# Patient Record
Sex: FEMALE | Race: BLACK OR AFRICAN AMERICAN | Employment: OTHER | URBAN - NONMETROPOLITAN AREA
[De-identification: names, ages, dates, MRNs, and addresses within clinical notes are randomized per-mention and may not be internally consistent; named-entity substitution may affect disease eponyms.]

---

## 2018-11-15 ENCOUNTER — APPOINTMENT (OUTPATIENT)
Dept: CT IMAGING | Age: 68
End: 2018-11-15
Payer: MEDICARE

## 2018-11-15 ENCOUNTER — APPOINTMENT (OUTPATIENT)
Dept: GENERAL RADIOLOGY | Age: 68
End: 2018-11-15
Payer: MEDICARE

## 2018-11-15 ENCOUNTER — HOSPITAL ENCOUNTER (EMERGENCY)
Age: 68
Discharge: NURSING, REHAB OR LONG-TERM CARE | End: 2018-11-15
Payer: MEDICARE

## 2018-11-15 VITALS
DIASTOLIC BLOOD PRESSURE: 70 MMHG | TEMPERATURE: 97.2 F | BODY MASS INDEX: 23.08 KG/M2 | RESPIRATION RATE: 8 BRPM | SYSTOLIC BLOOD PRESSURE: 90 MMHG | HEART RATE: 61 BPM | OXYGEN SATURATION: 96 % | WEIGHT: 143 LBS

## 2018-11-15 DIAGNOSIS — R40.4 TRANSIENT ALTERATION OF AWARENESS: Primary | ICD-10-CM

## 2018-11-15 LAB
ALBUMIN SERPL-MCNC: 4.1 G/DL (ref 3.5–5.1)
ALP BLD-CCNC: 52 U/L (ref 38–126)
ALT SERPL-CCNC: 12 U/L (ref 11–66)
AMPHETAMINE+METHAMPHETAMINE URINE SCREEN: NEGATIVE
ANION GAP SERPL CALCULATED.3IONS-SCNC: 11 MEQ/L (ref 8–16)
APTT: 30.2 SECONDS (ref 22–38)
AST SERPL-CCNC: 11 U/L (ref 5–40)
BARBITURATE QUANTITATIVE URINE: NEGATIVE
BASOPHILS # BLD: 0.5 %
BASOPHILS ABSOLUTE: 0 THOU/MM3 (ref 0–0.1)
BENZODIAZEPINE QUANTITATIVE URINE: NEGATIVE
BILIRUB SERPL-MCNC: 0.2 MG/DL (ref 0.3–1.2)
BILIRUBIN DIRECT: < 0.2 MG/DL (ref 0–0.3)
BILIRUBIN URINE: NEGATIVE
BLOOD, URINE: NEGATIVE
BUN BLDV-MCNC: 23 MG/DL (ref 7–22)
CALCIUM SERPL-MCNC: 9.2 MG/DL (ref 8.5–10.5)
CANNABINOID QUANTITATIVE URINE: NEGATIVE
CHARACTER, URINE: CLEAR
CHLORIDE BLD-SCNC: 106 MEQ/L (ref 98–111)
CO2: 27 MEQ/L (ref 23–33)
COCAINE METABOLITE QUANTITATIVE URINE: NEGATIVE
COLOR: YELLOW
CREAT SERPL-MCNC: 0.7 MG/DL (ref 0.4–1.2)
EKG ATRIAL RATE: 72 BPM
EKG P AXIS: 68 DEGREES
EKG P-R INTERVAL: 164 MS
EKG Q-T INTERVAL: 372 MS
EKG QRS DURATION: 80 MS
EKG QTC CALCULATION (BAZETT): 407 MS
EKG R AXIS: 51 DEGREES
EKG T AXIS: 71 DEGREES
EKG VENTRICULAR RATE: 72 BPM
EOSINOPHIL # BLD: 1.1 %
EOSINOPHILS ABSOLUTE: 0.1 THOU/MM3 (ref 0–0.4)
ERYTHROCYTE [DISTWIDTH] IN BLOOD BY AUTOMATED COUNT: 14.2 % (ref 11.5–14.5)
ERYTHROCYTE [DISTWIDTH] IN BLOOD BY AUTOMATED COUNT: 47 FL (ref 35–45)
ETHYL ALCOHOL, SERUM: < 0.01 %
GFR SERPL CREATININE-BSD FRML MDRD: > 90 ML/MIN/1.73M2
GLUCOSE BLD-MCNC: 83 MG/DL (ref 70–108)
GLUCOSE BLD-MCNC: 85 MG/DL (ref 70–108)
GLUCOSE URINE: NEGATIVE MG/DL
HCT VFR BLD CALC: 41.9 % (ref 37–47)
HEMOGLOBIN: 13.5 GM/DL (ref 12–16)
IMMATURE GRANS (ABS): 0.04 THOU/MM3 (ref 0–0.07)
IMMATURE GRANULOCYTES: 0.5 %
INR BLD: 0.93 (ref 0.85–1.13)
KETONES, URINE: NEGATIVE
LEUKOCYTE ESTERASE, URINE: NEGATIVE
LIPASE: 163 U/L (ref 5.6–51.3)
LYMPHOCYTES # BLD: 32.5 %
LYMPHOCYTES ABSOLUTE: 2.9 THOU/MM3 (ref 1–4.8)
MAGNESIUM: 2.3 MG/DL (ref 1.6–2.4)
MCH RBC QN AUTO: 29.4 PG (ref 26–33)
MCHC RBC AUTO-ENTMCNC: 32.2 GM/DL (ref 32.2–35.5)
MCV RBC AUTO: 91.3 FL (ref 81–99)
MONOCYTES # BLD: 5.1 %
MONOCYTES ABSOLUTE: 0.5 THOU/MM3 (ref 0.4–1.3)
NITRITE, URINE: NEGATIVE
NUCLEATED RED BLOOD CELLS: 0 /100 WBC
OPIATES, URINE: NEGATIVE
OSMOLALITY CALCULATION: 289.7 MOSMOL/KG (ref 275–300)
OXYCODONE: NEGATIVE
PH UA: 6
PHENCYCLIDINE QUANTITATIVE URINE: NEGATIVE
PLATELET # BLD: 220 THOU/MM3 (ref 130–400)
PMV BLD AUTO: 9.9 FL (ref 9.4–12.4)
POTASSIUM SERPL-SCNC: 4.6 MEQ/L (ref 3.5–5.2)
PRO-BNP: 135.4 PG/ML (ref 0–900)
PROLACTIN: 37.9 NG/ML
PROTEIN UA: NEGATIVE
RBC # BLD: 4.59 MILL/MM3 (ref 4.2–5.4)
SEG NEUTROPHILS: 60.3 %
SEGMENTED NEUTROPHILS ABSOLUTE COUNT: 5.4 THOU/MM3 (ref 1.8–7.7)
SODIUM BLD-SCNC: 144 MEQ/L (ref 135–145)
SPECIFIC GRAVITY, URINE: 1.02 (ref 1–1.03)
TOTAL PROTEIN: 7.1 G/DL (ref 6.1–8)
TROPONIN T: < 0.01 NG/ML
TSH SERPL DL<=0.05 MIU/L-ACNC: 4.74 UIU/ML (ref 0.4–4.2)
UROBILINOGEN, URINE: 0.2 EU/DL
VALPROIC ACID LEVEL: 51.2 UG/ML (ref 50–100)
WBC # BLD: 8.9 THOU/MM3 (ref 4.8–10.8)

## 2018-11-15 PROCEDURE — 85730 THROMBOPLASTIN TIME PARTIAL: CPT

## 2018-11-15 PROCEDURE — 93005 ELECTROCARDIOGRAM TRACING: CPT | Performed by: EMERGENCY MEDICINE

## 2018-11-15 PROCEDURE — G0480 DRUG TEST DEF 1-7 CLASSES: HCPCS

## 2018-11-15 PROCEDURE — 70450 CT HEAD/BRAIN W/O DYE: CPT

## 2018-11-15 PROCEDURE — 83690 ASSAY OF LIPASE: CPT

## 2018-11-15 PROCEDURE — 84146 ASSAY OF PROLACTIN: CPT

## 2018-11-15 PROCEDURE — 83880 ASSAY OF NATRIURETIC PEPTIDE: CPT

## 2018-11-15 PROCEDURE — 36415 COLL VENOUS BLD VENIPUNCTURE: CPT

## 2018-11-15 PROCEDURE — 84443 ASSAY THYROID STIM HORMONE: CPT

## 2018-11-15 PROCEDURE — 80053 COMPREHEN METABOLIC PANEL: CPT

## 2018-11-15 PROCEDURE — 84484 ASSAY OF TROPONIN QUANT: CPT

## 2018-11-15 PROCEDURE — 83735 ASSAY OF MAGNESIUM: CPT

## 2018-11-15 PROCEDURE — 80164 ASSAY DIPROPYLACETIC ACD TOT: CPT

## 2018-11-15 PROCEDURE — 81003 URINALYSIS AUTO W/O SCOPE: CPT

## 2018-11-15 PROCEDURE — 82948 REAGENT STRIP/BLOOD GLUCOSE: CPT

## 2018-11-15 PROCEDURE — 80307 DRUG TEST PRSMV CHEM ANLYZR: CPT

## 2018-11-15 PROCEDURE — 85610 PROTHROMBIN TIME: CPT

## 2018-11-15 PROCEDURE — 93010 ELECTROCARDIOGRAM REPORT: CPT | Performed by: INTERNAL MEDICINE

## 2018-11-15 PROCEDURE — 71045 X-RAY EXAM CHEST 1 VIEW: CPT

## 2018-11-15 PROCEDURE — 85025 COMPLETE CBC W/AUTO DIFF WBC: CPT

## 2018-11-15 PROCEDURE — 82248 BILIRUBIN DIRECT: CPT

## 2018-11-15 PROCEDURE — 99285 EMERGENCY DEPT VISIT HI MDM: CPT

## 2018-11-15 NOTE — ED NOTES
Bed: 007A  Expected date: 11/15/18  Expected time:   Means of arrival: [facility]  Comments:      Wende Soulier, RN  11/15/18 4519

## 2018-11-15 NOTE — ED NOTES
Waiting on [facility] to pick patient up to take to [facility].   time at this time is [facility], RN  11/15/18 3094

## 2018-11-15 NOTE — ED PROVIDER NOTES
tympanic membrane and ear canal normal. No hemotympanum. Nose: Nose normal.   Mouth/Throat: Uvula is midline, oropharynx is clear and moist and mucous membranes are normal.   Eyes: Pupils are equal, round, and reactive to light. Conjunctivae, EOM and lids are normal.   Neck: Trachea normal and normal range of motion. Neck supple. No JVD present. No neck rigidity. No tracheal deviation present. Cardiovascular: Normal rate, regular rhythm and normal heart sounds. No murmur heard. Pulmonary/Chest: Effort normal and breath sounds normal.   Abdominal: Soft. Normal appearance. She exhibits no distension. There is no tenderness. There is no rigidity and no guarding. Musculoskeletal: Normal range of motion. Extremities well perfused; No signs of DVT. Lymphadenopathy:     She has no cervical adenopathy. Neurological: She is alert. She has normal strength. She displays tremor. No cranial nerve deficit or sensory deficit. GCS eye subscore is 2. GCS verbal subscore is 2. GCS motor subscore is 4. Patient is a non-verbal Alzheimer's-dementia patient, responsive to pain and \"groans\" when rolled for further evaluation. Skin: Skin is warm and dry. No rash noted. Psychiatric: She has a normal mood and affect. Her behavior is normal.   Nursing note and vitals reviewed. DIFFERENTIAL DIAGNOSIS:   Including but not limited to: altered mental status, seizure, CVA, UTI, arrhythmia. DIAGNOSTIC RESULTS     EKG: [facility]'s [facility] who either signs or Co-signs this chart in the absence of a cardiologist.  Ernestina Lazcano.  Rate: 72 bpm  AZ interval: 164 ms  QRS duration: 80 ms  QTc: 407 ms  P-R-T axes: 68, 51, 71  Normal Sinus, No STEMI  Compared to old EKG on 02-Sept-2015    RADIOLOGY: non-plain film images(s) such as CT,Ultrasound and MRI are read by the radiologist.  Plain radiographic images are visualized and preliminarily interpreted by the emergency physician unless otherwise stated below. XR CHEST PORTABLE   Final Result      No acute cardiopulmonary disease. Mild cardiomegaly. **This report has been created using voice recognition software. It may contain minor errors which are inherent in voice recognition technology. **      Final report electronically signed by Dr. Julius Stapleton on 11/15/2018 6:55 AM      CT HEAD WO CONTRAST   Final Result      No acute ischemic infarct, hemorrhage, or mass effect. Ventricles remain dilated out of proportion to the degree of cortical sulcal prominence, either due to greater central atrophy versus communicating hydrocephalus. Correlate clinically. Stable appearance of the brain compared to the previous study as detailed above. **This report has been created using voice recognition software. It may contain minor errors which are inherent in voice recognition technology. **      Final report electronically signed by Dr. Julius Stapleton on 11/15/2018 6:13 AM          LABS:   Labs Reviewed   CBC WITH AUTO DIFFERENTIAL - Abnormal; Notable for the following:        Result Value    RDW-SD 47.0 (*)     All other components within normal limits   BASIC METABOLIC PANEL - Abnormal; Notable for the following:     BUN 23 (*)     All other components within normal limits   HEPATIC FUNCTION PANEL - Abnormal; Notable for the following:      Total Bilirubin 0.2 (*)     All other components within normal limits   LIPASE - Abnormal; Notable for the following:     Lipase 163.0 (*)     All other components within normal limits   TSH WITHOUT REFLEX - Abnormal; Notable for the following:     TSH 4.740 (*)     All other components within normal limits   PROTIME-INR   APTT   BRAIN NATRIURETIC PEPTIDE   TROPONIN   MAGNESIUM   ETHANOL   URINE DRUG SCREEN   URINE RT REFLEX TO CULTURE   VALPROIC ACID LEVEL, TOTAL   ANION GAP   OSMOLALITY   GLOMERULAR FILTRATION RATE, ESTIMATED   PROLACTIN   POCT GLUCOSE       EMERGENCY DEPARTMENT COURSE:   Vitals: Vitals:    11/15/18 0624 11/15/18 0803 11/15/18 0913 11/15/18 0936   BP:  105/79 (!) 101/59 90/70   Pulse: 71 66 68 61   Resp: 19 13 16 8   Temp:       TempSrc:       SpO2: 100% 95% 96%    Weight:         Patient was seen and evaluated for an episode of altered mental status per the nursing home. Physical exam revealed the patient to be responsive to pain and at patient's baseline mentation. Lab results were within normal limits for patient's baseline. Chest XR indicated mild cardiomegaly with no acute cardiopulmonary disease. Head CT indicated no acute ischemic infarct, hemorrhage or mass effect, stable appearance of the brain compared to previous study. The patient has remained stable while here. She has been observed on a cardiac monitor and pulse oximetry. She remains neurologically intact and at her baseline mentation. Patient was discharged back to [facility] in stable condition. They were instructed to return patient to ED for any new or worsening symptoms. CRITICAL CARE:   None    CONSULTS:  None    PROCEDURES:  None    FINAL IMPRESSION      1. Transient alteration of awareness          DISPOSITION/PLAN   Discharged  1. Transient alteration of awareness        PATIENT REFERRED TO:  DO Luana Harris 5  [phone]    Schedule an appointment as soon as possible for a visit         DISCHARGE MEDICATIONS:  Discharge Medication List as of 11/15/2018  8:09 AM          (Please note that portions of this note were completed with a voice recognition program.  Efforts were made to edit the dictations but occasionally words aremis-transcribed.)    Scribe:  Lilliana Cervantes 11/15/18 6:13 AM Scribing for and in the presence of ANMOL Bowden.     Signed by: Lindy Muñoz 11/15/18 11:54 AM    Provider:  I personally performed the services described in the documentation, reviewed and edited the documentation which was dictated to the scribe in my presence, and

## 2019-02-15 ENCOUNTER — HOSPITAL ENCOUNTER (EMERGENCY)
Age: 69
Discharge: NURSING, REHAB OR LONG-TERM CARE | End: 2019-02-15
Payer: MEDICARE

## 2019-02-15 VITALS
BODY MASS INDEX: 22.6 KG/M2 | TEMPERATURE: 97.7 F | OXYGEN SATURATION: 98 % | RESPIRATION RATE: 16 BRPM | SYSTOLIC BLOOD PRESSURE: 115 MMHG | WEIGHT: 140 LBS | DIASTOLIC BLOOD PRESSURE: 44 MMHG | HEART RATE: 71 BPM

## 2019-02-15 DIAGNOSIS — R56.9 SEIZURE (HCC): Primary | ICD-10-CM

## 2019-02-15 LAB
ALBUMIN SERPL-MCNC: 4.3 G/DL (ref 3.5–5.1)
ALP BLD-CCNC: 46 U/L (ref 38–126)
ALT SERPL-CCNC: 8 U/L (ref 11–66)
ANION GAP SERPL CALCULATED.3IONS-SCNC: 14 MEQ/L (ref 8–16)
AST SERPL-CCNC: 13 U/L (ref 5–40)
BASOPHILS # BLD: 0.5 %
BASOPHILS ABSOLUTE: 0 THOU/MM3 (ref 0–0.1)
BILIRUB SERPL-MCNC: 0.3 MG/DL (ref 0.3–1.2)
BILIRUBIN URINE: NEGATIVE
BLOOD, URINE: NEGATIVE
BUN BLDV-MCNC: 21 MG/DL (ref 7–22)
CALCIUM SERPL-MCNC: 9 MG/DL (ref 8.5–10.5)
CHARACTER, URINE: CLEAR
CHLORIDE BLD-SCNC: 105 MEQ/L (ref 98–111)
CO2: 24 MEQ/L (ref 23–33)
COLOR: YELLOW
CREAT SERPL-MCNC: 0.7 MG/DL (ref 0.4–1.2)
EKG ATRIAL RATE: 96 BPM
EKG P AXIS: 61 DEGREES
EKG P-R INTERVAL: 182 MS
EKG Q-T INTERVAL: 344 MS
EKG QRS DURATION: 86 MS
EKG QTC CALCULATION (BAZETT): 434 MS
EKG R AXIS: 32 DEGREES
EKG T AXIS: 65 DEGREES
EKG VENTRICULAR RATE: 96 BPM
EOSINOPHIL # BLD: 2.3 %
EOSINOPHILS ABSOLUTE: 0.2 THOU/MM3 (ref 0–0.4)
ERYTHROCYTE [DISTWIDTH] IN BLOOD BY AUTOMATED COUNT: 14.5 % (ref 11.5–14.5)
ERYTHROCYTE [DISTWIDTH] IN BLOOD BY AUTOMATED COUNT: 48.3 FL (ref 35–45)
GFR SERPL CREATININE-BSD FRML MDRD: > 90 ML/MIN/1.73M2
GLUCOSE BLD-MCNC: 81 MG/DL (ref 70–108)
GLUCOSE URINE: NEGATIVE MG/DL
HCT VFR BLD CALC: 40.4 % (ref 37–47)
HEMOGLOBIN: 13.1 GM/DL (ref 12–16)
IMMATURE GRANS (ABS): 0.02 THOU/MM3 (ref 0–0.07)
IMMATURE GRANULOCYTES: 0.3 %
KETONES, URINE: NEGATIVE
LEUKOCYTE ESTERASE, URINE: NEGATIVE
LYMPHOCYTES # BLD: 46.5 %
LYMPHOCYTES ABSOLUTE: 3.5 THOU/MM3 (ref 1–4.8)
MAGNESIUM: 2 MG/DL (ref 1.6–2.4)
MCH RBC QN AUTO: 29.6 PG (ref 26–33)
MCHC RBC AUTO-ENTMCNC: 32.4 GM/DL (ref 32.2–35.5)
MCV RBC AUTO: 91.2 FL (ref 81–99)
MONOCYTES # BLD: 4.5 %
MONOCYTES ABSOLUTE: 0.3 THOU/MM3 (ref 0.4–1.3)
NITRITE, URINE: NEGATIVE
NUCLEATED RED BLOOD CELLS: 0 /100 WBC
OSMOLALITY CALCULATION: 287 MOSMOL/KG (ref 275–300)
PH UA: 6
PLATELET # BLD: 211 THOU/MM3 (ref 130–400)
PMV BLD AUTO: 9.7 FL (ref 9.4–12.4)
POTASSIUM SERPL-SCNC: 4.6 MEQ/L (ref 3.5–5.2)
PROTEIN UA: NEGATIVE
RBC # BLD: 4.43 MILL/MM3 (ref 4.2–5.4)
SEG NEUTROPHILS: 45.9 %
SEGMENTED NEUTROPHILS ABSOLUTE COUNT: 3.4 THOU/MM3 (ref 1.8–7.7)
SODIUM BLD-SCNC: 143 MEQ/L (ref 135–145)
SPECIFIC GRAVITY, URINE: 1.01 (ref 1–1.03)
TOTAL PROTEIN: 6.8 G/DL (ref 6.1–8)
TROPONIN T: < 0.01 NG/ML
UROBILINOGEN, URINE: 0.2 EU/DL
VALPROIC ACID LEVEL: 48.7 UG/ML (ref 50–100)
WBC # BLD: 7.5 THOU/MM3 (ref 4.8–10.8)

## 2019-02-15 PROCEDURE — 99285 EMERGENCY DEPT VISIT HI MDM: CPT

## 2019-02-15 PROCEDURE — 36415 COLL VENOUS BLD VENIPUNCTURE: CPT

## 2019-02-15 PROCEDURE — 80053 COMPREHEN METABOLIC PANEL: CPT

## 2019-02-15 PROCEDURE — 80164 ASSAY DIPROPYLACETIC ACD TOT: CPT

## 2019-02-15 PROCEDURE — 81003 URINALYSIS AUTO W/O SCOPE: CPT

## 2019-02-15 PROCEDURE — 85025 COMPLETE CBC W/AUTO DIFF WBC: CPT

## 2019-02-15 PROCEDURE — 83735 ASSAY OF MAGNESIUM: CPT

## 2019-02-15 PROCEDURE — 93005 ELECTROCARDIOGRAM TRACING: CPT | Performed by: EMERGENCY MEDICINE

## 2019-02-15 PROCEDURE — 2709999900 HC NON-CHARGEABLE SUPPLY

## 2019-02-15 PROCEDURE — 93010 ELECTROCARDIOGRAM REPORT: CPT | Performed by: INTERNAL MEDICINE

## 2019-02-15 PROCEDURE — 84484 ASSAY OF TROPONIN QUANT: CPT

## 2019-02-15 RX ORDER — MELOXICAM 7.5 MG/1
7.5 TABLET ORAL DAILY
Status: ON HOLD | COMMUNITY
End: 2021-07-21 | Stop reason: HOSPADM

## 2019-02-15 RX ORDER — FAMOTIDINE 20 MG/1
20 TABLET, FILM COATED ORAL 2 TIMES DAILY
Status: ON HOLD | COMMUNITY
End: 2021-07-18

## 2019-02-15 RX ORDER — CLONAZEPAM 0.5 MG/1
0.5 TABLET ORAL 2 TIMES DAILY PRN
COMMUNITY
End: 2019-05-18

## 2019-02-15 RX ORDER — SENNOSIDES 8.8 MG/5ML
5 LIQUID ORAL NIGHTLY
Status: ON HOLD | COMMUNITY
End: 2021-07-18

## 2019-04-30 PROBLEM — G40.909 SEIZURE DISORDER (HCC): Chronic | Status: ACTIVE | Noted: 2019-04-30

## 2019-04-30 PROBLEM — G30.1 LATE ONSET ALZHEIMER'S DISEASE WITH BEHAVIORAL DISTURBANCE (HCC): Chronic | Status: ACTIVE | Noted: 2019-04-30

## 2019-04-30 PROBLEM — F02.818 LATE ONSET ALZHEIMER'S DISEASE WITH BEHAVIORAL DISTURBANCE (HCC): Chronic | Status: ACTIVE | Noted: 2019-04-30

## 2019-04-30 NOTE — PROGRESS NOTES
Lainey Yang is a 71 y.o. female that is being seen at [facility] for Dementia      Lainey Yang  1950  5/3/19      HPI:  Patient seen and examined at bedside. History obtained from patient and chart. Patient is unable to give any history today due to her dementia. Resting comfortably in bed. I have reviewed the patient's past medical history, past surgical history, allergies,medications, social and family history and I have made updates where appropriate. Past Medical History:   Diagnosis Date    Anxiety     CAD (coronary artery disease)     Chronic pain     Chronic UTI     Depression     GERD (gastroesophageal reflux disease)     Hypertension     Psychiatric problem        Past Surgical History:   Procedure Laterality Date    TONSILLECTOMY      TUBAL LIGATION         Social History     Tobacco Use    Smoking status: Former Smoker     Types: Cigarettes   Substance Use Topics    Alcohol use: No    Drug use: Yes     Types: Marijuana       Family History   Problem Relation Age of Onset    Other Mother          Review of Systems        PHYSICAL EXAM:    Weight - 147 lbs  BP - 115/67  Temp - 98.0  HR - 66  RR - 18    Physical Exam   Constitutional: She appears well-developed and well-nourished. No distress. HENT:   Head: Normocephalic and atraumatic. Right Ear: Hearing and external ear normal.   Left Ear: Hearing and external ear normal.   Nose: Nose normal.   Mouth/Throat: Oropharynx is clear and moist.   Eyes: Conjunctivae are normal. Right eye exhibits no discharge. Left eye exhibits no discharge. Neck: Normal range of motion. Neck supple. No tracheal deviation present. No thyromegaly present. Cardiovascular: Normal rate, regular rhythm and normal heart sounds. Exam reveals no gallop and no friction rub. No murmur heard. Pulmonary/Chest: Effort normal. No respiratory distress. She has no wheezes. She has no rales. She exhibits no tenderness.    Musculoskeletal: She exhibits no deformity. Lymphadenopathy:     She has no cervical adenopathy. Neurological: She is alert. She exhibits normal muscle tone. Coordination normal.   Skin: Skin is warm and dry. No rash noted. No erythema. Psychiatric: She has a normal mood and affect. She is slowed. Cognition and memory are impaired. She expresses inappropriate judgment. Nursing note and vitals reviewed. ASSESSMENT & PLAN  Fariba Bryson was seen today for dementia. Diagnoses and all orders for this visit:    Essential hypertension    Late onset Alzheimer's disease with behavioral disturbance    Seizure disorder ([facility] [address]        1. Dementia with Behavioral Disturbances:  cont Aricept, Klonopin, Trazodone. 2. Chronic Pain:  cont APAP  3. Seizures:  cont Divalproex, PRN Ativan  4. GERD:  cont omeprazole  5. HTN:  cont Metoprolol, Lasix, ASA  6.  Dispo:  stable, reeval in 1 month or PRN

## 2019-05-03 ENCOUNTER — OUTSIDE SERVICES (OUTPATIENT)
Dept: FAMILY MEDICINE CLINIC | Age: 69
End: 2019-05-03
Payer: MEDICARE

## 2019-05-03 DIAGNOSIS — G30.1 LATE ONSET ALZHEIMER'S DISEASE WITH BEHAVIORAL DISTURBANCE (HCC): Chronic | ICD-10-CM

## 2019-05-03 DIAGNOSIS — G40.909 SEIZURE DISORDER (HCC): Chronic | ICD-10-CM

## 2019-05-03 DIAGNOSIS — F02.818 LATE ONSET ALZHEIMER'S DISEASE WITH BEHAVIORAL DISTURBANCE (HCC): Chronic | ICD-10-CM

## 2019-05-03 DIAGNOSIS — I10 ESSENTIAL HYPERTENSION: Primary | ICD-10-CM

## 2019-05-03 PROCEDURE — 1123F ACP DISCUSS/DSCN MKR DOCD: CPT | Performed by: FAMILY MEDICINE

## 2019-05-03 PROCEDURE — 99308 SBSQ NF CARE LOW MDM 20: CPT | Performed by: FAMILY MEDICINE

## 2019-05-18 ENCOUNTER — HOSPITAL ENCOUNTER (EMERGENCY)
Age: 69
Discharge: HOME | End: 2019-05-18
Attending: EMERGENCY MEDICINE
Payer: MEDICARE

## 2019-05-18 ENCOUNTER — APPOINTMENT (OUTPATIENT)
Dept: CT IMAGING | Age: 69
End: 2019-05-18
Payer: MEDICARE

## 2019-05-18 VITALS
HEIGHT: 63 IN | HEART RATE: 76 BPM | WEIGHT: 155 LBS | BODY MASS INDEX: 27.46 KG/M2 | SYSTOLIC BLOOD PRESSURE: 141 MMHG | OXYGEN SATURATION: 100 % | TEMPERATURE: 97.8 F | DIASTOLIC BLOOD PRESSURE: 99 MMHG | RESPIRATION RATE: 16 BRPM

## 2019-05-18 DIAGNOSIS — G40.919 BREAKTHROUGH SEIZURE (HCC): Primary | ICD-10-CM

## 2019-05-18 LAB
ANION GAP SERPL CALCULATED.3IONS-SCNC: 12 MEQ/L (ref 8–16)
BASOPHILS # BLD: 0.4 %
BASOPHILS ABSOLUTE: 0 THOU/MM3 (ref 0–0.1)
BUN BLDV-MCNC: 24 MG/DL (ref 7–22)
CALCIUM SERPL-MCNC: 9.3 MG/DL (ref 8.5–10.5)
CHLORIDE BLD-SCNC: 102 MEQ/L (ref 98–111)
CO2: 27 MEQ/L (ref 23–33)
CREAT SERPL-MCNC: 0.8 MG/DL (ref 0.4–1.2)
EOSINOPHIL # BLD: 1 %
EOSINOPHILS ABSOLUTE: 0.1 THOU/MM3 (ref 0–0.4)
ERYTHROCYTE [DISTWIDTH] IN BLOOD BY AUTOMATED COUNT: 13.6 % (ref 11.5–14.5)
ERYTHROCYTE [DISTWIDTH] IN BLOOD BY AUTOMATED COUNT: 45.9 FL (ref 35–45)
GFR SERPL CREATININE-BSD FRML MDRD: 86 ML/MIN/1.73M2
GLUCOSE BLD-MCNC: 87 MG/DL
GLUCOSE BLD-MCNC: 87 MG/DL (ref 70–108)
GLUCOSE BLD-MCNC: 87 MG/DL (ref 70–108)
HCT VFR BLD CALC: 41 % (ref 37–47)
HEMOGLOBIN: 13.6 GM/DL (ref 12–16)
IMMATURE GRANS (ABS): 0.04 THOU/MM3 (ref 0–0.07)
IMMATURE GRANULOCYTES: 0.4 %
LYMPHOCYTES # BLD: 45 %
LYMPHOCYTES ABSOLUTE: 4.5 THOU/MM3 (ref 1–4.8)
MCH RBC QN AUTO: 30.4 PG (ref 26–33)
MCHC RBC AUTO-ENTMCNC: 33.2 GM/DL (ref 32.2–35.5)
MCV RBC AUTO: 91.7 FL (ref 81–99)
MONOCYTES # BLD: 5.1 %
MONOCYTES ABSOLUTE: 0.5 THOU/MM3 (ref 0.4–1.3)
NUCLEATED RED BLOOD CELLS: 0 /100 WBC
OSMOLALITY CALCULATION: 284.7 MOSMOL/KG (ref 275–300)
PLATELET # BLD: 191 THOU/MM3 (ref 130–400)
PMV BLD AUTO: 10.2 FL (ref 9.4–12.4)
POTASSIUM REFLEX MAGNESIUM: 5 MEQ/L (ref 3.5–5.2)
RBC # BLD: 4.47 MILL/MM3 (ref 4.2–5.4)
SEG NEUTROPHILS: 48.1 %
SEGMENTED NEUTROPHILS ABSOLUTE COUNT: 4.8 THOU/MM3 (ref 1.8–7.7)
SODIUM BLD-SCNC: 141 MEQ/L (ref 135–145)
VALPROIC ACID LEVEL: 57 UG/ML (ref 50–100)
WBC # BLD: 9.9 THOU/MM3 (ref 4.8–10.8)

## 2019-05-18 PROCEDURE — 70450 CT HEAD/BRAIN W/O DYE: CPT

## 2019-05-18 PROCEDURE — 80048 BASIC METABOLIC PNL TOTAL CA: CPT

## 2019-05-18 PROCEDURE — 36415 COLL VENOUS BLD VENIPUNCTURE: CPT

## 2019-05-18 PROCEDURE — 2709999900 HC NON-CHARGEABLE SUPPLY

## 2019-05-18 PROCEDURE — 99284 EMERGENCY DEPT VISIT MOD MDM: CPT

## 2019-05-18 PROCEDURE — 82948 REAGENT STRIP/BLOOD GLUCOSE: CPT

## 2019-05-18 PROCEDURE — 85025 COMPLETE CBC W/AUTO DIFF WBC: CPT

## 2019-05-18 PROCEDURE — 2580000003 HC RX 258: Performed by: EMERGENCY MEDICINE

## 2019-05-18 PROCEDURE — 80164 ASSAY DIPROPYLACETIC ACD TOT: CPT

## 2019-05-18 RX ORDER — 0.9 % SODIUM CHLORIDE 0.9 %
1000 INTRAVENOUS SOLUTION INTRAVENOUS ONCE
Status: COMPLETED | OUTPATIENT
Start: 2019-05-18 | End: 2019-05-18

## 2019-05-18 RX ORDER — CLONAZEPAM 0.5 MG/1
0.5 TABLET ORAL 2 TIMES DAILY
Status: ON HOLD | COMMUNITY
End: 2021-07-18

## 2019-05-18 RX ADMIN — SODIUM CHLORIDE 1000 ML: 9 INJECTION, SOLUTION INTRAVENOUS at 16:33

## 2019-05-18 NOTE — ED TRIAGE NOTES
Pt presents to room 3 per [facility] from [facility] s/p seizure x 2 today. 1st seizure at 0825 this AM and 2nd seizure at 1358 today. Pt is non-verbal, which is normal for her. At present time, pt will not follow commands. Does yell out upon application of pain (moving left arm and needle sticks). Pt is a total assist and usually gets up in a wheelchair, takes a pureed diet with pills crushed in ice cream per report taken from F. Respirations even and unlabored; skin warm & dry. NAD noted.

## 2019-05-18 NOTE — ED PROVIDER NOTES
mouth daily Hold if SBP < 110 or HR < 60      traZODone (DESYREL) 50 MG tablet Take 50 mg by mouth nightly      divalproex (DEPAKOTE SPRINKLE) 125 MG capsule Take 500 mg by mouth three times daily Historical Med      acetaminophen (TYLENOL) 325 MG tablet Take 650 mg by mouth 3 times daily      aspirin 81 MG chewable tablet Take 1 tablet by mouth daily, Disp-30 tablet, R-3             ALLERGIES     Pcn [penicillins]    FAMILY HISTORY       Family History   Problem Relation Age of Onset    Other Mother         SOCIAL HISTORY       Social History     Socioeconomic History    Marital status:      Spouse name: None    Number of children: 10    Years of education: 6    Highest education level: None   Occupational History    None   Social Needs    Financial resource strain: None    Food insecurity:     Worry: None     Inability: None    Transportation needs:     Medical: None     Non-medical: None   Tobacco Use    Smoking status: Former Smoker     Types: Cigarettes    Smokeless tobacco: Never Used   Substance and Sexual Activity    Alcohol use: No    Drug use: Yes     Types: Marijuana    Sexual activity: Never   Lifestyle    Physical activity:     Days per week: None     Minutes per session: None    Stress: None   Relationships    Social connections:     Talks on phone: None     Gets together: None     Attends Sikh service: None     Active member of club or organization: None     Attends meetings of clubs or organizations: None     Relationship status: None    Intimate partner violence:     Fear of current or ex partner: None     Emotionally abused: None     Physically abused: None     Forced sexual activity: None   Other Topics Concern    None   Social History Narrative    None       REVIEW OF SYSTEMS       Review of Systems   Unable to perform ROS: Dementia       Except as noted above the remainder of the review of systemswasreviewed and is negative.    PHYSICAL EXAM    (up to 7 for level 4, 8 or more for level 5)     Physical Exam   HENT:   Head: Atraumatic. Eyes: Conjunctivae are normal.   Pupils are 2mm bilaterally and sluggishly reactive. Neck: No tracheal deviation present. No thyromegaly present. Cardiovascular: Normal rate, regular rhythm and normal heart sounds. Exam reveals no friction rub. No murmur heard. Pulmonary/Chest: Effort normal and breath sounds normal. No stridor. No respiratory distress. She has no wheezes. Musculoskeletal:   Apparent contracture of the RUE. Patient vocalizes pain but does not withdraw from pain. Patient does not move extremities spontaneously. Neurological:   Moaning on arrival. Vocalized pain but does not withdraw from pain. Clenching jaw and eyes during examination. Skin: Skin is warm and dry. She is not diaphoretic. Nursing note and vitals reviewed. DIAGNOSTIC RESULTS     EKG: (none if blank)  All EKG's are interpreted by the Emergency Department Physician who either signs or Co-signs this chart in the absence of a cardiologist.      RADIOLOGY: (none if blank)  Interpretation per the Radiologist below, if available at the time of this note:    CT Head WO Contrast   Final Result   Severe chronic changes. No acute process identified no change            **This report has been created using voice recognition software. It may contain minor errors which are inherent in voice recognition technology. **      Final report electronically signed by Dr. Mayra Huerta on 5/18/2019 3:54 PM          LABS:  Labs Reviewed   CBC WITH AUTO DIFFERENTIAL - Abnormal; Notable for the following components:       Result Value    RDW-SD 45.9 (*)     All other components within normal limits   BASIC METABOLIC PANEL W/ REFLEX TO MG FOR LOW K - Abnormal; Notable for the following components:    BUN 24 (*)     All other components within normal limits   GLOMERULAR FILTRATION RATE, ESTIMATED - Abnormal; Notable for the following components:    Est, Glom Filt Rate 86 (*)     All other components within normal limits   POCT GLUCOSE - Normal   VALPROIC ACID LEVEL, TOTAL   ANION GAP   OSMOLALITY   POCT GLUCOSE       All other labs were within normalrange ornot returned as of this dictation. EMERGENCY DEPARTMENT COURSE and Medical Decision Making:     MDM/  ED Course as of May 18 2153   Sat May 18, 2019   1616 Discussed with the nursing facility and the nurse who regularly takes care of the patient. Apparently the current mental state  is quite normal for the patient and she is typically they're in a wheelchair or in bed, typically with her eyes closed, typically with contractures, and her mental status is essentially at baseline at this time. The concern today was 2 seizures  approximately 3-4 hours a piece, very brief in nature, less than 1 minute and therefore she was sent into the ED for evaluation.    [AB]      ED Course User Index  [AB] Uma Jolly DO         ED Medications administered this visit:    Medications   0.9 % sodium chloride bolus (0 mLs Intravenous Stopped 5/18/19 2558)       CONSULTS: (None if blank)  None    Procedures:(None ifblank)       CLINICAL IMPRESSION      1. Breakthrough seizure [facility])          DISPOSITION/PLAN   DISPOSITION Decision To Discharge 05/18/2019 05:14:02 PM      PATIENTREFERRED TO:  Larisa Ramos DO  [facility] 5  [phone]    In 2 days        DISCHARGE MEDICATIONS:  Discharge Medication List as of 5/18/2019  5:15 PM                 (Pleasenote that portions of this note were completed with a voice recognition program.  Efforts were made to edit the dictations but occasionally words are mis-transcribed.)    Scribe:  Morgan German 5/18/19 2:48 PM Scribing for and in the presence of Uma Jolly DO.     Signed by:Mabel Martin, 05/18/19 9:53 PM    Provider:  I personally performed the services described in the documentation, reviewedand edited thedocumentation which was dictated to the scribe in my presence, and it accurately records my words and actions.     Carmencita Nam DO 5/18/19 9:53 PM         Carmencita Nam DO, FACEP (electronically signed)  Attending Emergency Physician        Carmencita Nam DO  05/18/19 0864

## 2019-05-18 NOTE — ED NOTES
Called report to [place] at [facility].        Suresh Young RN  05/18/19 1911

## 2019-05-18 NOTE — ED NOTES
Pt resting in bed quietly. Respirations easy & unlabored; skin warm & dry. NAD noted. Pt denies needs for restroom or repositioning at present time. Pt's pain needs addressed. Pt updated re: plan of care.          Ryan Sanchez RN  05/18/19 4668

## 2019-05-18 NOTE — ED NOTES
Called [facility] for transport back to [facility]. ETA: 1930. Facesheet and Medical Necessity faxed to [facility].        Suresh Young RN  05/18/19 4911

## 2019-05-18 NOTE — ED NOTES
Pt resting in bed quietly. Respirations easy & unlabored; skin warm & dry. NAD noted. Pt denies needs for restroom or repositioning at present time. Pt's pain needs addressed. Pt updated re: plan of care.          Mercedes Sharma RN  05/18/19 1462

## 2019-09-13 ENCOUNTER — OUTSIDE SERVICES (OUTPATIENT)
Dept: FAMILY MEDICINE CLINIC | Age: 69
End: 2019-09-13
Payer: MEDICARE

## 2019-09-13 DIAGNOSIS — G30.1 LATE ONSET ALZHEIMER'S DISEASE WITH BEHAVIORAL DISTURBANCE (HCC): Chronic | ICD-10-CM

## 2019-09-13 DIAGNOSIS — G40.909 SEIZURE DISORDER (HCC): Primary | Chronic | ICD-10-CM

## 2019-09-13 DIAGNOSIS — F02.818 LATE ONSET ALZHEIMER'S DISEASE WITH BEHAVIORAL DISTURBANCE (HCC): Chronic | ICD-10-CM

## 2019-09-13 DIAGNOSIS — I10 ESSENTIAL HYPERTENSION: Chronic | ICD-10-CM

## 2019-09-13 PROCEDURE — 99308 SBSQ NF CARE LOW MDM 20: CPT | Performed by: FAMILY MEDICINE

## 2020-01-16 VITALS
TEMPERATURE: 98 F | SYSTOLIC BLOOD PRESSURE: 122 MMHG | WEIGHT: 135 LBS | HEART RATE: 70 BPM | DIASTOLIC BLOOD PRESSURE: 70 MMHG | BODY MASS INDEX: 23.91 KG/M2 | RESPIRATION RATE: 18 BRPM

## 2020-01-17 ENCOUNTER — OUTSIDE SERVICES (OUTPATIENT)
Dept: FAMILY MEDICINE CLINIC | Age: 70
End: 2020-01-17
Payer: MEDICARE

## 2020-01-17 PROCEDURE — 99308 SBSQ NF CARE LOW MDM 20: CPT | Performed by: FAMILY MEDICINE

## 2020-01-17 NOTE — PROGRESS NOTES
sounds: Normal heart sounds. No murmur. No friction rub. No gallop. Pulmonary:      Effort: Pulmonary effort is normal. No respiratory distress. Breath sounds: No wheezing or rales. Chest:      Chest wall: No tenderness. Musculoskeletal:         General: No deformity. Lymphadenopathy:      Cervical: No cervical adenopathy. Skin:     General: Skin is warm and dry. Findings: No erythema or rash. Neurological:      Mental Status: She is alert. Motor: No abnormal muscle tone. Coordination: Coordination normal.   Psychiatric:         Behavior: Behavior is slowed. Cognition and Memory: Memory is impaired. Judgment: Judgment is inappropriate. ASSESSMENT & PLAN  Lonny Mckay was seen today for dementia. Diagnoses and all orders for this visit:    Essential hypertension    Late onset Alzheimer's disease with behavioral disturbance ([facility] [address]    Seizure disorder ([facility] [address]        1. Dementia with Behavioral Disturbances:  cont Aricept, Klonopin, Trazodone. 2. Chronic Pain:  cont APAP  3. Seizures:  cont Divalproex, PRN Ativan  4. GERD:  cont omeprazole  5. HTN:  cont Metoprolol, Lasix, ASA  6.  Dispo:  stable, reeval in 1 month or PRN

## 2020-02-17 ENCOUNTER — OUTSIDE SERVICES (OUTPATIENT)
Dept: FAMILY MEDICINE CLINIC | Age: 70
End: 2020-02-17
Payer: MEDICARE

## 2020-02-17 PROCEDURE — 99309 SBSQ NF CARE MODERATE MDM 30: CPT | Performed by: NURSE PRACTITIONER

## 2020-02-18 NOTE — PROGRESS NOTES
NAME: Velia Hughes  DATE: 2020   ROOM #: [id number]  CODE STATUS:  Full  REASON FOR VISIT:Follow up of chronic medical conditions  : [birth year]    HPI: Female in NAD. Pt seen and examined at bedside. History obtained from patient and chart. Up in wheelchair at baseline mentation. No verbalization or any attempts to communicate. No appearance of pain or distress. No new concerns reported by nursing. PMHx: Dementia, HLD, Chronic pain, Seizure disorder, Insomnia, recurrent UTIs   PSHx: Tubal ligation, Tonsillectomy   Social Hx: No tobacco or EtOH   FamHx: unknown     PHYSICAL EXAM   Vital Signs:  T, BP, P, RR, Wt  98, 129/74, 78, 18, 133#  General Appearance: well developed and well- nourished, in no acute distress   Head: normocephalic and atraumatic   ENT: external ear and ear canal normal bilaterally, nose without deformity, nasal mucosa and turbinates normal without polyps, oropharynx normal, dentition is normal for age, no lip or gum lesions noted   Neck: supple and non-tender without mass, no thyromegaly or thyroid nodules, no cervical lymphadenopathy   Pulmonary/Chest: clear to auscultation bilaterally- no wheezes, rales or rhonchi, normal air movement, no respiratory distress or retractions   Cardiovascular: normal rate, regular rhythm, normal S1 and S2, no murmurs, rubs, clicks, or gallops, distal pulses intact   Abdomen: soft, non-tender, non-distended, no rebound or guarding, no masses or hernias noted, no hepatospleenomegaly   Extremities: no cyanosis, clubbing or edema of the lower extremities   Musculoskeletal: No joint swelling or gross deformity   Psych: Flat affect without evidence of depression or anxiety, unable to assess insight, judgement or memory. Skin: warm and dry, no rash or erythema     ASSESSMENT AND PLAN   1. Dementia with Behavioral Disturbances: No behaviors. Continue Aricept, Klonopin, Depakote, and Trazodone   2.  Dysphagia: Swallowing has improved, however with progression of

## 2020-02-26 ENCOUNTER — HOSPITAL ENCOUNTER (EMERGENCY)
Age: 70
Discharge: HOME | End: 2020-02-26
Attending: EMERGENCY MEDICINE
Payer: MEDICARE

## 2020-02-26 VITALS
HEART RATE: 61 BPM | SYSTOLIC BLOOD PRESSURE: 155 MMHG | RESPIRATION RATE: 17 BRPM | WEIGHT: 138 LBS | TEMPERATURE: 98.2 F | DIASTOLIC BLOOD PRESSURE: 98 MMHG | OXYGEN SATURATION: 94 % | BODY MASS INDEX: 24.45 KG/M2

## 2020-02-26 LAB
ANION GAP SERPL CALCULATED.3IONS-SCNC: 14 MEQ/L (ref 8–16)
BACTERIA: ABNORMAL /HPF
BASOPHILS # BLD: 0.4 %
BASOPHILS ABSOLUTE: 0 THOU/MM3 (ref 0–0.1)
BILIRUBIN URINE: NEGATIVE
BLOOD, URINE: NEGATIVE
BUN BLDV-MCNC: 16 MG/DL (ref 7–22)
CALCIUM SERPL-MCNC: 8.9 MG/DL (ref 8.5–10.5)
CASTS 2: ABNORMAL /LPF
CASTS UA: ABNORMAL /LPF
CHARACTER, URINE: CLEAR
CHLORIDE BLD-SCNC: 106 MEQ/L (ref 98–111)
CO2: 23 MEQ/L (ref 23–33)
COLOR: YELLOW
CREAT SERPL-MCNC: 0.8 MG/DL (ref 0.4–1.2)
CRYSTALS, UA: ABNORMAL
EKG ATRIAL RATE: 68 BPM
EKG P AXIS: 62 DEGREES
EKG P-R INTERVAL: 154 MS
EKG Q-T INTERVAL: 396 MS
EKG QRS DURATION: 86 MS
EKG QTC CALCULATION (BAZETT): 421 MS
EKG R AXIS: 48 DEGREES
EKG T AXIS: 72 DEGREES
EKG VENTRICULAR RATE: 68 BPM
EOSINOPHIL # BLD: 0.8 %
EOSINOPHILS ABSOLUTE: 0.1 THOU/MM3 (ref 0–0.4)
EPITHELIAL CELLS, UA: ABNORMAL /HPF
ERYTHROCYTE [DISTWIDTH] IN BLOOD BY AUTOMATED COUNT: 14.5 % (ref 11.5–14.5)
ERYTHROCYTE [DISTWIDTH] IN BLOOD BY AUTOMATED COUNT: 53.6 FL (ref 35–45)
GFR SERPL CREATININE-BSD FRML MDRD: 86 ML/MIN/1.73M2
GLUCOSE BLD-MCNC: 90 MG/DL (ref 70–108)
GLUCOSE URINE: NEGATIVE MG/DL
HCT VFR BLD CALC: 37.3 % (ref 37–47)
HEMOGLOBIN: 11.7 GM/DL (ref 12–16)
IMMATURE GRANS (ABS): 0.02 THOU/MM3 (ref 0–0.07)
IMMATURE GRANULOCYTES: 0.2 %
KETONES, URINE: NEGATIVE
LEUKOCYTE ESTERASE, URINE: ABNORMAL
LYMPHOCYTES # BLD: 49.5 %
LYMPHOCYTES ABSOLUTE: 4.1 THOU/MM3 (ref 1–4.8)
MCH RBC QN AUTO: 31.5 PG (ref 26–33)
MCHC RBC AUTO-ENTMCNC: 31.4 GM/DL (ref 32.2–35.5)
MCV RBC AUTO: 100.3 FL (ref 81–99)
MISCELLANEOUS 2: ABNORMAL
MONOCYTES # BLD: 5.9 %
MONOCYTES ABSOLUTE: 0.5 THOU/MM3 (ref 0.4–1.3)
NITRITE, URINE: POSITIVE
NUCLEATED RED BLOOD CELLS: 0 /100 WBC
OSMOLALITY CALCULATION: 285.7 MOSMOL/KG (ref 275–300)
PH UA: 5.5 (ref 5–9)
PLATELET # BLD: 218 THOU/MM3 (ref 130–400)
PMV BLD AUTO: 10.2 FL (ref 9.4–12.4)
POTASSIUM REFLEX MAGNESIUM: 5 MEQ/L (ref 3.5–5.2)
PROTEIN UA: NEGATIVE
RBC # BLD: 3.72 MILL/MM3 (ref 4.2–5.4)
RBC URINE: ABNORMAL /HPF
RENAL EPITHELIAL, UA: ABNORMAL
SEG NEUTROPHILS: 43.2 %
SEGMENTED NEUTROPHILS ABSOLUTE COUNT: 3.6 THOU/MM3 (ref 1.8–7.7)
SODIUM BLD-SCNC: 143 MEQ/L (ref 135–145)
SPECIFIC GRAVITY, URINE: 1.01 (ref 1–1.03)
UROBILINOGEN, URINE: 0.2 EU/DL (ref 0–1)
VALPROIC ACID LEVEL: 74.7 UG/ML (ref 50–100)
WBC # BLD: 8.3 THOU/MM3 (ref 4.8–10.8)
WBC UA: ABNORMAL /HPF
YEAST: ABNORMAL

## 2020-02-26 PROCEDURE — 80048 BASIC METABOLIC PNL TOTAL CA: CPT

## 2020-02-26 PROCEDURE — 87086 URINE CULTURE/COLONY COUNT: CPT

## 2020-02-26 PROCEDURE — 99284 EMERGENCY DEPT VISIT MOD MDM: CPT

## 2020-02-26 PROCEDURE — 87186 SC STD MICRODIL/AGAR DIL: CPT

## 2020-02-26 PROCEDURE — 36415 COLL VENOUS BLD VENIPUNCTURE: CPT

## 2020-02-26 PROCEDURE — 6360000002 HC RX W HCPCS: Performed by: EMERGENCY MEDICINE

## 2020-02-26 PROCEDURE — 96367 TX/PROPH/DG ADDL SEQ IV INF: CPT

## 2020-02-26 PROCEDURE — 81001 URINALYSIS AUTO W/SCOPE: CPT

## 2020-02-26 PROCEDURE — 85025 COMPLETE CBC W/AUTO DIFF WBC: CPT

## 2020-02-26 PROCEDURE — 2580000003 HC RX 258: Performed by: EMERGENCY MEDICINE

## 2020-02-26 PROCEDURE — 96365 THER/PROPH/DIAG IV INF INIT: CPT

## 2020-02-26 PROCEDURE — 80164 ASSAY DIPROPYLACETIC ACD TOT: CPT

## 2020-02-26 PROCEDURE — 93005 ELECTROCARDIOGRAM TRACING: CPT | Performed by: EMERGENCY MEDICINE

## 2020-02-26 PROCEDURE — 87077 CULTURE AEROBIC IDENTIFY: CPT

## 2020-02-26 RX ORDER — NITROFURANTOIN 25; 75 MG/1; MG/1
100 CAPSULE ORAL 2 TIMES DAILY
Qty: 14 CAPSULE | Refills: 0 | Status: SHIPPED | OUTPATIENT
Start: 2020-02-26 | End: 2020-03-04

## 2020-02-26 RX ADMIN — CEFTRIAXONE SODIUM 1 G: 1 INJECTION, POWDER, FOR SOLUTION INTRAMUSCULAR; INTRAVENOUS at 16:46

## 2020-02-26 RX ADMIN — LEVETIRACETAM 1000 MG: 100 INJECTION, SOLUTION INTRAVENOUS at 15:47

## 2020-02-26 NOTE — ED PROVIDER NOTES
[facility]  eMERGENCY dEPARTMENT eNCOUnter          CHIEF COMPLAINT       Chief Complaint   Patient presents with    Seizures       Nurses Notes reviewed and I agree except as noted in the HPI. HISTORY OF PRESENT ILLNESS    Xenia Landon is a 71 y.o. female who presents to the Emergency Department from [facility] via EMS for the evaluation of a seizure. Per NH, the seizure was unwitnessed and they found her in a postictal state. NH states the patient is at her baseline. The patient is a poor historian and is nonverbal. Per NH, the patient was hypotensive with her systolic BP in the 66A. Patient's BP while in the ED is 129/50. The patient has a past medical history of anxiety, CAD, and HTN. No further complaints at the time of the initial encounter. REVIEW OF SYSTEMS     Review of Systems   Unable to perform ROS: Other       PAST MEDICAL HISTORY    has a past medical history of Anxiety, CAD (coronary artery disease), Chronic pain, Chronic UTI, Depression, GERD (gastroesophageal reflux disease), Hypertension, and Psychiatric problem. SURGICAL HISTORY    has a past surgical history that includes Tonsillectomy and Tubal ligation. CURRENT MEDICATIONS       Discharge Medication List as of 2/26/2020  5:40 PM      CONTINUE these medications which have NOT CHANGED    Details   clonazePAM (KLONOPIN) 0.5 MG tablet Take 0.5 mg by mouth 2 times daily. Historical Med      famotidine (PEPCID) 20 MG tablet Take 20 mg by mouth 2 times dailyHistorical Med      meloxicam (MOBIC) 7.5 MG tablet Take 7.5 mg by mouth dailyHistorical Med      senna (SENOKOT) 8.8 MG/5ML SYRP syrup Take 5 mLs by mouth nightlyHistorical Med      Donepezil HCl (ARICEPT) 23 MG TABS tablet Take 10 mg by mouth daily Historical Med      furosemide (LASIX) 20 MG tablet Take 20 mg by mouth daily      metoprolol (LOPRESSOR) 25 MG tablet Take 12.5 mg by mouth daily Hold if SBP < 110 or HR < 60      traZODone (DESYREL) 50 MG tablet Take 50 mg by mouth nightly      divalproex (DEPAKOTE SPRINKLE) 125 MG capsule Take 500 mg by mouth three times daily Historical Med      acetaminophen (TYLENOL) 325 MG tablet Take 650 mg by mouth 3 times daily      aspirin 81 MG chewable tablet Take 1 tablet by mouth daily, Disp-30 tablet, R-3             ALLERGIES     is allergic to pcn [penicillins]. FAMILY HISTORY     She indicated that her mother is . She indicated that her sister is alive. family history includes Other in her mother. SOCIAL HISTORY      reports that she has quit smoking. Her smoking use included cigarettes. She has never used smokeless tobacco. She reports current drug use. Drug: Marijuana. She reports that she does not drink alcohol. PHYSICAL EXAM     INITIAL VITALS:  weight is 138 lb (62.6 kg). Her temperature is 98.2 °F (36.8 °C). Her blood pressure is 155/98 (abnormal) and her pulse is 61. Her respiration is 17 and oxygen saturation is 94%. Physical Exam  Vitals signs and nursing note reviewed. Constitutional:       Appearance: She is well-developed. She is not toxic-appearing or diaphoretic. Comments: Patient is nonverbal.    HENT:      Head: Normocephalic and atraumatic. Right Ear: Tympanic membrane and external ear normal.      Left Ear: Tympanic membrane and external ear normal.      Nose: Nose normal.      Mouth/Throat:      Pharynx: No oropharyngeal exudate or posterior oropharyngeal erythema. Eyes:      Conjunctiva/sclera: Conjunctivae normal.   Neck:      Musculoskeletal: Normal range of motion and neck supple. Vascular: No JVD. Cardiovascular:      Rate and Rhythm: Normal rate and regular rhythm. Pulses: Normal pulses. Heart sounds: Normal heart sounds. No murmur. No friction rub. No gallop. Pulmonary:      Effort: Pulmonary effort is normal. No respiratory distress. Breath sounds: Normal breath sounds. No decreased breath sounds, wheezing, rhonchi or rales.    Abdominal: 02/26/20 1751   BP: (!) 129/50 (!) 90/57 95/78 (!) 155/98   Pulse: 76 60 60 61   Resp: 21 19 18 17   Temp: 98.2 °F (36.8 °C)      SpO2: 95% 94% 94% 94%   Weight: 138 lb (62.6 kg)          3:33 PM: The patient was seen and evaluated. MDM:  Patient presenting from the nursing home with complaint of having had a seizure at the nursing home. Patient has history of seizures, Depakote within normal limits. She's had occasional breakthrough seizures that wanted ED visit to the ED. Patient given Keppra in the ED. No seizures noted in the ED. Patient has UTI, treated. Case referred to neurology as outpatient follow-up. CRITICAL CARE:   None     CONSULTS:      PROCEDURES:  None     FINAL IMPRESSION      1. Breakthrough seizure ([address]    2. Acute cystitis with hematuria          DISPOSITION/PLAN       PATIENT REFERRED TO:  Lashell Lay, [address]  [phone]    Call in 1 day  [address] [facility], DO  [address] [place] [address]   [phone]    Schedule an appointment as soon as possible for a visit in 1 day  RE-CHECK AND FURTHER TESTING AS NEEDED      DISCHARGE MEDICATIONS:  Discharge Medication List as of 2/26/2020  5:40 PM          (Please note that portions of this note were completed with a voice recognition program.  Efforts were made to edit the dictations but occasionally words are mis-transcribed.)    Scribe:  Terrance Patel 2/26/20 3:33 PM Scribing for and in the presence of Ladi Partida DO. Signed by: Mabel Arriaga, 02/26/20 7:53 PM    Provider:  I personally performed the services described in the documentation, reviewed and edited the documentation which was dictated to the scribe in my presence, and it accurately records my words and actions.     Ladi Partida DO 2/26/20 7:53 PM        Ladi Partida DO  02/26/20 1953

## 2020-02-26 NOTE — ED NOTES
Report called to [facility] at this time. Updated them on ETA of 2300.      Palomo Soliman RN  02/26/20 [id number]

## 2020-02-26 NOTE — ED NOTES
Pt resting in bed, respirations easy and unlabored. Jamie remains in place. Warm blankets applied for patient comfort.      Kat Kumar RN  02/26/20 2946

## 2020-02-26 NOTE — ED TRIAGE NOTES
Presents to ER via [employer] from [facility] after having an unwitnessed seizure at the nursing home. Nursing home states pt was found drooling and snoring which they states is what happens after she has a seizure. Pt baseline is nonverbal and unable to follow commands. Pt currently has eyes open, but unable to answer questions or follow commands. EMS states this is baseline. Nursing Home states pt was hypotensive prior to transport, upon arrival pt BP is 126/50. Pt is contracted and favoring the right side. Pillow placed under right side. Respirations unlabored. Padded side rails in place.

## 2020-02-27 PROCEDURE — 93010 ELECTROCARDIOGRAM REPORT: CPT | Performed by: INTERNAL MEDICINE

## 2020-02-28 LAB
ORGANISM: ABNORMAL
URINE CULTURE REFLEX: ABNORMAL

## 2020-02-29 NOTE — PROGRESS NOTES
Pharmacy Note  ED Culture Follow-up    Job Boone is a 71 y.o. female. Allergies: Pcn [penicillins]     Labs:  Lab Results   Component Value Date    BUN 16 02/26/2020    CREATININE 0.8 02/26/2020    WBC 8.3 02/26/2020     CrCl cannot be calculated (Unknown ideal weight.). Current antimicrobials:   macrobid    ASSESSMENT:  Micro results:   Urine culture: positive for e. coli      PLAN:  Need for intervention: No 2/2 s-macrobid   Discussed with: Trinity Rios PA-C  Chosen treatment:    Patient already on appropriate treatment as above    Patient response:   No need to contact patient    Called/sent in prescription to: Not applicable    Please call with any questions.  Ext. [phone]

## 2020-03-01 ENCOUNTER — HOSPITAL ENCOUNTER (EMERGENCY)
Age: 70
Discharge: HOME | End: 2020-03-01
Attending: EMERGENCY MEDICINE
Payer: MEDICARE

## 2020-03-01 ENCOUNTER — APPOINTMENT (OUTPATIENT)
Dept: GENERAL RADIOLOGY | Age: 70
End: 2020-03-01
Payer: MEDICARE

## 2020-03-01 VITALS
BODY MASS INDEX: 24.45 KG/M2 | TEMPERATURE: 97.8 F | OXYGEN SATURATION: 99 % | RESPIRATION RATE: 14 BRPM | WEIGHT: 138 LBS | HEIGHT: 63 IN | SYSTOLIC BLOOD PRESSURE: 133 MMHG | HEART RATE: 88 BPM | DIASTOLIC BLOOD PRESSURE: 79 MMHG

## 2020-03-01 LAB
ALBUMIN SERPL-MCNC: 3.5 G/DL (ref 3.5–5.1)
ALP BLD-CCNC: 62 U/L (ref 38–126)
ALT SERPL-CCNC: 6 U/L (ref 11–66)
ANION GAP SERPL CALCULATED.3IONS-SCNC: 9 MEQ/L (ref 8–16)
AST SERPL-CCNC: 12 U/L (ref 5–40)
BASOPHILS # BLD: 0.2 %
BASOPHILS ABSOLUTE: 0 THOU/MM3 (ref 0–0.1)
BILIRUB SERPL-MCNC: 0.2 MG/DL (ref 0.3–1.2)
BUN BLDV-MCNC: 20 MG/DL (ref 7–22)
CALCIUM SERPL-MCNC: 9.1 MG/DL (ref 8.5–10.5)
CHLORIDE BLD-SCNC: 104 MEQ/L (ref 98–111)
CO2: 25 MEQ/L (ref 23–33)
CREAT SERPL-MCNC: 0.6 MG/DL (ref 0.4–1.2)
EKG ATRIAL RATE: 94 BPM
EKG P AXIS: 60 DEGREES
EKG P-R INTERVAL: 152 MS
EKG Q-T INTERVAL: 348 MS
EKG QRS DURATION: 86 MS
EKG QTC CALCULATION (BAZETT): 435 MS
EKG R AXIS: 35 DEGREES
EKG T AXIS: 69 DEGREES
EKG VENTRICULAR RATE: 94 BPM
EOSINOPHIL # BLD: 0.2 %
EOSINOPHILS ABSOLUTE: 0 THOU/MM3 (ref 0–0.4)
ERYTHROCYTE [DISTWIDTH] IN BLOOD BY AUTOMATED COUNT: 14.3 % (ref 11.5–14.5)
ERYTHROCYTE [DISTWIDTH] IN BLOOD BY AUTOMATED COUNT: 51.9 FL (ref 35–45)
GFR SERPL CREATININE-BSD FRML MDRD: > 90 ML/MIN/1.73M2
GLUCOSE BLD-MCNC: 95 MG/DL (ref 70–108)
HCT VFR BLD CALC: 39.5 % (ref 37–47)
HEMOGLOBIN: 12.3 GM/DL (ref 12–16)
IMMATURE GRANS (ABS): 0.02 THOU/MM3 (ref 0–0.07)
IMMATURE GRANULOCYTES: 0.2 %
LYMPHOCYTES # BLD: 28.6 %
LYMPHOCYTES ABSOLUTE: 2.7 THOU/MM3 (ref 1–4.8)
MCH RBC QN AUTO: 30.8 PG (ref 26–33)
MCHC RBC AUTO-ENTMCNC: 31.1 GM/DL (ref 32.2–35.5)
MCV RBC AUTO: 98.8 FL (ref 81–99)
MONOCYTES # BLD: 6 %
MONOCYTES ABSOLUTE: 0.6 THOU/MM3 (ref 0.4–1.3)
NUCLEATED RED BLOOD CELLS: 0 /100 WBC
OSMOLALITY CALCULATION: 278.1 MOSMOL/KG (ref 275–300)
PLATELET # BLD: 185 THOU/MM3 (ref 130–400)
PMV BLD AUTO: 10.8 FL (ref 9.4–12.4)
POTASSIUM REFLEX MAGNESIUM: 4 MEQ/L (ref 3.5–5.2)
RBC # BLD: 4 MILL/MM3 (ref 4.2–5.4)
SEG NEUTROPHILS: 64.8 %
SEGMENTED NEUTROPHILS ABSOLUTE COUNT: 6 THOU/MM3 (ref 1.8–7.7)
SODIUM BLD-SCNC: 138 MEQ/L (ref 135–145)
TOTAL PROTEIN: 6.8 G/DL (ref 6.1–8)
TROPONIN T: < 0.01 NG/ML
WBC # BLD: 9.3 THOU/MM3 (ref 4.8–10.8)

## 2020-03-01 PROCEDURE — 36415 COLL VENOUS BLD VENIPUNCTURE: CPT

## 2020-03-01 PROCEDURE — 84484 ASSAY OF TROPONIN QUANT: CPT

## 2020-03-01 PROCEDURE — 99284 EMERGENCY DEPT VISIT MOD MDM: CPT

## 2020-03-01 PROCEDURE — 80053 COMPREHEN METABOLIC PANEL: CPT

## 2020-03-01 PROCEDURE — 71045 X-RAY EXAM CHEST 1 VIEW: CPT

## 2020-03-01 PROCEDURE — 93005 ELECTROCARDIOGRAM TRACING: CPT | Performed by: EMERGENCY MEDICINE

## 2020-03-01 PROCEDURE — 85025 COMPLETE CBC W/AUTO DIFF WBC: CPT

## 2020-03-01 RX ORDER — VALPROIC ACID 250 MG/5ML
1000 SOLUTION ORAL EVERY 8 HOURS
COMMUNITY

## 2020-03-01 NOTE — ED PROVIDER NOTES
there are no acute hemodynamic instabilities. Repeating the seizure work-up from a week ago unlikely to be very high yield. Currently being treated for urinary tract infection and today is afebrile without signs of hypotension or tachycardia to suggest outpatient treatment failure of antibiotics. However Sandro Stratton states there has been no problems from a urine standpoint. She is nonverbal and does not cooperate so forcing her to straight cath for yet another sample unlikely to be very productive and just uncomfortable for the patient. Long discussion with power of  regarding goals of care. He is talking about how the patient is likely going to end up with a feeding tube and we discussed the pros and cons of someone with essentially no mental status having a feeding tube. CRITICAL CARE:        CONSULTS:      PROCEDURES:       FINAL IMPRESSION      1. Seizure [facility])          DISPOSITION/PLAN   Discharge in stable condition    PATIENT REFERRED TO:  Arina Izquierdo [address]  [phone]    Call         DISCHARGE MEDICATIONS:  New Prescriptions    No medications on file       (Please note that portions of this note were completed with a voice recognition program.  Efforts were made to edit the dictations but occasionally words are mis-transcribed.)    Scribe:  Jazmyn Burroughs 3/1/20 12:11 PM Scribing for and in the presence of Collette Pier, MD.    Signed by: Jazmyn Burroughs (Name), Mabel, 03/01/20 2:13 PM    Provider:  I personally performed the services described in the documentation, reviewed and edited the documentation which was dictated to the scribe in my presence, and it accurately records my words and actions.     Collette Pier, MD 3/1/20 2:13 PM       Collette Pier, MD  03/01/20 0881

## 2020-03-01 NOTE — ED NOTES
Transferring Facility: [facility]  Call back #:  Per sending provider request: Dr. Matt Lucero Status:        Full                     Allergies:  Reason for transfer: pt had a 3 minute episode of unresponsiveness where her eyes rolled up and deviated and she had naveen garnett respirations. Nursing staff report she is back to her normal now and pt has had a hx of these episodes but their provider requested pt come in for eval. Pt lungs area clear. She normally doesn't respond to verbal stimuli or follow commands. Pt was recently here and started on macrobid for UTI. Pt has hx of dementia, takes nectar thick liquids.  She is a full assist.     Vital Signs:  BP:  P:  R:  T:  O2 SATS:    Other Details:        Karen Arizmendi RN  03/01/20 7237

## 2020-03-01 NOTE — ED TRIAGE NOTES
Pt to ed per ems from [address] [place] [address] home for a reported unresponsive episode with snoring respirations. Ems report that upon there arrival pt was her normal. Ems reports that these episodes occur multiple times a month. Ems reports that pt is non-verbal however responds to painful stimuli. ekg obtained upon pt's arrival and pt placed on the cardiac monitor. Dr. Shazia Mccormick into assess pt and discuss the POC. Will monitor for further orders. Sarles sitter placed on the pt for safety. Call light in reach with sr up x2.

## 2020-03-02 PROCEDURE — 93010 ELECTROCARDIOGRAM REPORT: CPT | Performed by: INTERNAL MEDICINE

## 2020-03-15 ENCOUNTER — OUTSIDE SERVICES (OUTPATIENT)
Dept: FAMILY MEDICINE CLINIC | Age: 70
End: 2020-03-15
Payer: MEDICARE

## 2020-03-15 PROCEDURE — 99309 SBSQ NF CARE MODERATE MDM 30: CPT | Performed by: NURSE PRACTITIONER

## 2020-03-17 ENCOUNTER — OUTSIDE SERVICES (OUTPATIENT)
Dept: FAMILY MEDICINE CLINIC | Age: 70
End: 2020-03-17
Payer: MEDICARE

## 2020-03-17 PROCEDURE — 99308 SBSQ NF CARE LOW MDM 20: CPT | Performed by: NURSE PRACTITIONER

## 2020-03-18 NOTE — PROGRESS NOTES
NAME: Andrei Kelly  DATE: 2020   ROOM #: 57-1  CODE STATUS:  Full  REASON FOR VISIT:Follow up of chronic medical conditions  : [birth year]    HPI: Female in NAD. Pt seen and examined at bedside. History obtained from patient and chart. Up in wheelchair at baseline mentation. No verbalization or any attempts to communicate. No appearance of pain or distress. No new concerns reported by nursing. PMHx: Dementia, HLD, Chronic pain, Seizure disorder, Insomnia, recurrent UTIs   PSHx: Tubal ligation, Tonsillectomy   Social Hx: No tobacco or EtOH   FamHx: unknown     PHYSICAL EXAM   Vital Signs:  T, BP, P, RR, Wt  97.4, 129/74, 78, 20, 132#  General Appearance: well developed and well- nourished, in no acute distress   Head: normocephalic and atraumatic   ENT: external ear canal normal. No lip or gum lesions noted    Pulmonary/Chest:No respiratory distress or retractions   Musculoskeletal: No obvious joint swelling or gross deformity   Neuro/Psych: Asleep. Unable to assess    ASSESSMENT AND PLAN   1. Seizures: Last week patient had 2 seizures within 8 hours, prior to that had a couple of seizures within the previous month, which were believed to be due to her inability to swallow. She has been taking her medication without difficulty, so her Depakote was increased last week to 875 mg TID. Nursing stating that there have been no further reported/observed seizure activity. She is due for a Valproic acid level tomorrow. 2. Dementia with Behavioral Disturbances: No behaviors. Continue Aricept, Klonopin, Depakote, and Trazodone   3. Dysphagia: Swallowing has improved to the point that she is now swallowing without difficulty. She saw GI, however any plans for Gtube are on hold at this time. Will continue to monitor for recurrence. 4.  Chronic Pain: No appearance of pain. Continue Meloxicam.  5. GERD: Swallowing has improved. Continue Famotidine  6.  HTN: BP stable, at goal. Continue Metoprolol, Lasix, ASA

## 2020-03-31 ENCOUNTER — FOLLOWUP TELEPHONE ENCOUNTER (OUTPATIENT)
Dept: HOME HEALTH SERVICES | Age: 70
End: 2020-03-31

## 2020-03-31 NOTE — PROGRESS NOTES
Referral received this date for Outpatient Palliative Care from [facility], [address] [facility]. Background:  Patient lives at [facility] past several years. She has shown decline in ADL status since admit, related to Alzheimer's Dementia. Baseline includes being nonverbal, total assist.  Epic charting reviewed and guardian Keenan Valencia called to discuss condition. Cornelius Espinosa is 80years old, is a step-father to patient. Other family members \"do not visit her anymore. \"  She had a tooth abscess that led to anorexia and it is documented that she had some dysphagia, trouble taking anti-seizure meds, which led to some weight loss and increase in seizure activity. Antibiotic was given, speech therapy worked with patient and staff now report her appetite to be improved and ORIANA Morgan has documented that patient is taking meds without difficulty. Her depakoke was increased with 3/17 visit. Weight 4/30/2019 was #147.  3/17/20 #132. I reviewed above with guardian. I also had seen that patient was made a DNRCC-Arrest in 2015 when hospitalized. She was then changed to a full code at some point. Cornelius Espinosa was not involved in her care in 2015. He stated that he speaks with his biological children as well to help him made decisions about her care. I offered options including transporting for seizures/not transporting, full code vs no CPR. Full code explained in detail. At this time, he believes she is \"out of the danger zone\" since she is eating again. He has discussed with family that due to her dementia status, they would not \"keep her alive on life support. \"  He wishes to keep code status as is at this time. He is agreeable to palliative care following patient and understands we will provide education with any changes, update on a condition decline and help him know his options for patient.

## 2020-04-13 ENCOUNTER — OUTSIDE SERVICES (OUTPATIENT)
Dept: FAMILY MEDICINE CLINIC | Age: 70
End: 2020-04-13
Payer: MEDICARE

## 2020-04-13 PROCEDURE — 99309 SBSQ NF CARE MODERATE MDM 30: CPT | Performed by: NURSE PRACTITIONER

## 2020-04-13 NOTE — PROGRESS NOTES
stool  Genitourinary:  Difficulty or painful urination, Flank pain, Change in frequency, Urgency  Skin:  Color change, Rash, Itching, Wound  Musculoskeletal:  Joint pain, Back pain, Gait problems, Joint swelling, Myalgias  Neurological:  Dizziness, Headaches, Presyncope, Numbness, Seizures, Tremors  Endocrine:  Heat Intolerance, Cold Intolerance, Polydipsia, Polyphagia, Polyuria    OBJECTIVE:  PHYSICAL EXAM:  VS:  134/82, 97.6, 84, 18, 95% on room air  Weight in pounds:  134.0  Pain: No s/s of pain    VS reviewed. General Appearance: chronically ill and debilitated, in no acute distress  Head: normocephalic and atraumatic  Eyes: conjunctivae and eye lids without erythema  ENT: external ear and ear canal normal bilaterally, nose without deformity, nasal mucosa and turbinates normal without polyps, oropharynx normal, dentition is normal for age, no lip or gum lesions noted  Neck: supple and non-tender without mass, no thyromegaly or thyroid nodules, no cervical lymphadenopathy  Pulmonary/Chest: clear but diminished to auscultation bilaterally- no wheezes, rales or rhonchi, normal air movement, no respiratory distress or retractions, requires no supplemental oxygen  Cardiovascular: normal rate, regular rhythm, normal S1 and S2, no murmurs, rubs, clicks, or gallops, distal pulses intact  Abdomen: soft, non-tender, non-distended, bowel sounds physiologic,  no rebound or guarding, no masses or hernias noted. Liver and spleen without enlargement. Extremities: no cyanosis, clubbing or edema of the lower extremities  Musculoskeletal: No joint swelling or gross deformity   Neuro:  Alert, 4/5 strength globally and symmetrically, normal speech, no focal findings or movement disorder noted  Psych:  Normal affect without evidence of depression or anxiety, insight and judgement are impaired, memory appears impaired  Skin: warm and dry, no rash or erythema    ASSESSMENT & PLAN:    1.  Essential hypertension  BP at goal with

## 2020-05-14 VITALS
BODY MASS INDEX: 23.03 KG/M2 | RESPIRATION RATE: 18 BRPM | DIASTOLIC BLOOD PRESSURE: 68 MMHG | TEMPERATURE: 98.4 F | HEART RATE: 72 BPM | WEIGHT: 130 LBS | SYSTOLIC BLOOD PRESSURE: 119 MMHG

## 2020-05-15 ENCOUNTER — VIRTUAL VISIT (OUTPATIENT)
Dept: FAMILY MEDICINE CLINIC | Age: 70
End: 2020-05-15
Payer: MEDICARE

## 2020-05-15 PROCEDURE — 1123F ACP DISCUSS/DSCN MKR DOCD: CPT | Performed by: FAMILY MEDICINE

## 2020-05-15 PROCEDURE — 99308 SBSQ NF CARE LOW MDM 20: CPT | Performed by: FAMILY MEDICINE

## 2020-06-10 ENCOUNTER — OUTSIDE SERVICES (OUTPATIENT)
Dept: FAMILY MEDICINE CLINIC | Age: 70
End: 2020-06-10
Payer: MEDICARE

## 2020-06-10 PROCEDURE — 99308 SBSQ NF CARE LOW MDM 20: CPT | Performed by: NURSE PRACTITIONER

## 2020-07-13 ENCOUNTER — OUTSIDE SERVICES (OUTPATIENT)
Dept: FAMILY MEDICINE CLINIC | Age: 70
End: 2020-07-13
Payer: MEDICARE

## 2020-07-13 PROCEDURE — 99308 SBSQ NF CARE LOW MDM 20: CPT | Performed by: NURSE PRACTITIONER

## 2020-08-17 ENCOUNTER — OUTSIDE SERVICES (OUTPATIENT)
Dept: FAMILY MEDICINE CLINIC | Age: 70
End: 2020-08-17

## 2020-08-17 NOTE — PROGRESS NOTES
NAME: Pepper Dial  DATE: 2020  ROOM #: 57-1  CODE STATUS:  Full  REASON FOR VISIT:Follow up of chronic medical conditions  : [birth year]    HPI: Elderly female patient up in wheelchair. Pt seen and examined at bedside. History obtained from patient and chart. Patient lying in bed on side, eyes open, but no attempts at communication. Does not appear to recognize her surroundings. Afebrile, without any appearance of pain or distress. Nursing offers no new concerns. No recent seizure activity reported. Intermittent issues with swallowing are unchanged. PMHx: Dementia, HLD, Chronic pain, Seizure disorder, Insomnia, recurrent UTIs   PSHx: Tubal ligation, Tonsillectomy   Social Hx: No tobacco or EtOH   FamHx: unknown     PHYSICAL EXAM   Vital Signs:  T, BP, P, RR, Wt  98, 111/77, 77, 18, 129#  General Appearance: well developed and well- nourished, in no acute distress   Head: normocephalic and atraumatic   ENT: external ear canal normal. No lip or gum lesions noted    Pulmonary/Chest:No respiratory distress or retractions   Musculoskeletal: No obvious joint swelling or gross deformity   Neuro/Psych: Lethargic. Unable to assess    ASSESSMENT AND PLAN   1. Seizures:  Continue lorazepam, valproic acid, and periodic labs. 2. Dementia with Behavioral Disturbances: Due to advancement of dementia, she is no longer having behaviors. Continue Aricept, Klonopin, Depakote, and Trazodone   3. Dysphagia: Continues to have intermittent issues. However, recently has been able to take medications and eat her food most of the time. Occasionally pocketing. She remains a full code and family has expressed in the past should the need arise for a PEG, they will proceed with this. Follows with GI.  4.  Chronic Pain: No appearance of pain. Continue Meloxicam.  5. GERD: Swallowing has improved. Continue Famotidine  6.  HTN: BP stable, at goal. Continue Metoprolol, Lasix, ASA Antipsychotic/Antianxiety/Hypnotic/Psychotropic/Sedation/Antidepressant medications are continued at this time because discontinuation may result in adverse effects or return of concerning behaviors/symptoms.     Plan of care reviewed with Dr Perfecto Menchaca, CNP

## 2020-09-16 VITALS
RESPIRATION RATE: 18 BRPM | TEMPERATURE: 97.9 F | HEART RATE: 77 BPM | SYSTOLIC BLOOD PRESSURE: 127 MMHG | DIASTOLIC BLOOD PRESSURE: 73 MMHG | WEIGHT: 123 LBS | BODY MASS INDEX: 21.79 KG/M2

## 2020-09-16 NOTE — PROGRESS NOTES
Mimi Cleveland is a 79 y.o. female that is being seen at [facility] for Dementia      Mimi Cleveland  1950  9/16/20      HPI:  Patient seen and examined at bedside. History obtained from patient and chart. Patient is unable to give any history today due to her dementia. Resting comfortably in her wheelchair at breakfast.      I have reviewed the patient's past medical history, past surgical history, allergies,medications, social and family history and I have made updates where appropriate. Past Medical History:   Diagnosis Date    Alzheimer disease ([address]     Anxiety     Aphasia     Bipolar 2 disorder ([address]     CAD (coronary artery disease)     Cerebral artery occlusion with cerebral infarction (HCC)     Chronic pain     Chronic UTI     Contracture of joint of shoulder region, left     Dementia ([address]     Depression     Dysphagia     GERD (gastroesophageal reflux disease)     Hyperlipidemia     Hypertension     Psychiatric problem     Seizures (HCC)        Past Surgical History:   Procedure Laterality Date    TONSILLECTOMY      TUBAL LIGATION         Social History     Tobacco Use    Smoking status: Former Smoker     Types: Cigarettes    Smokeless tobacco: Never Used   Substance Use Topics    Alcohol use: No    Drug use: Yes     Types: Marijuana       Family History   Problem Relation Age of Onset    Other Mother          Review of Systems        PHYSICAL EXAM:    /73   Pulse 77   Temp 97.9 °F (36.6 °C)   Resp 18   Wt 123 lb (55.8 kg)   BMI 21.79 kg/m²       Physical Exam  Vitals signs and nursing note reviewed. Constitutional:       General: She is not in acute distress. Appearance: She is well-developed. HENT:      Head: Normocephalic and atraumatic. Right Ear: Hearing and external ear normal.      Left Ear: Hearing and external ear normal.      Nose: Nose normal.   Eyes:      General:         Right eye: No discharge. Left eye: No discharge.

## 2020-09-18 ENCOUNTER — OUTSIDE SERVICES (OUTPATIENT)
Dept: FAMILY MEDICINE CLINIC | Age: 70
End: 2020-09-18
Payer: MEDICARE

## 2020-09-18 PROCEDURE — 99308 SBSQ NF CARE LOW MDM 20: CPT | Performed by: FAMILY MEDICINE

## 2020-10-21 ENCOUNTER — OUTSIDE SERVICES (OUTPATIENT)
Dept: FAMILY MEDICINE CLINIC | Age: 70
End: 2020-10-21
Payer: MEDICARE

## 2020-10-21 PROCEDURE — 99308 SBSQ NF CARE LOW MDM 20: CPT | Performed by: NURSE PRACTITIONER

## 2020-10-23 NOTE — PROGRESS NOTES
NAME: Martinez Castano  DATE: 10-  ROOM #: 57-1  CODE STATUS:  Full  REASON FOR VISIT:Follow up of chronic medical conditions  : [birth year]    HPI:  Elderly patient lying in bed, with eyes closed. Without any appearance of distress or pain. She opens eyes during assessment, without any recognition of her environment. Non verbal. No new concerns per nursing. PMHx: Dementia, HLD, Chronic pain, Seizure disorder, Insomnia, recurrent UTIs   PSHx: Tubal ligation, Tonsillectomy   Social Hx: No tobacco or EtOH   FamHx: unknown     PHYSICAL EXAM   Vital Signs:  T, BP, P, RR, Wt  98, 121/77, 80, 18, 127#  General Appearance: debilitated appearing female in no acute distress   Head: normocephalic and atraumatic   ENT: external ear canal normal. No lip or gum lesions noted    Pulmonary/Chest:No respiratory distress or retractions   Musculoskeletal: No obvious joint swelling or gross deformity   Neuro/Psych: Lethargic. Unable to assess    ASSESSMENT AND PLAN   1. Seizures: No report of recent seizures. Continue lorazepam, valproic acid, and periodic labs. 2. Dementia with Behavioral Disturbances: Due to advancement of dementia, she is no longer having behaviors, spending her time in bed or in gerichair, without ability to perform any ADLs. Continue Aricept, Klonopin, Depakote, and Trazodone     3. Chronic Pain: No appearance of pain. Continue Meloxicam.  4. GERD: Swallowing has improved. Continue Famotidine  5. HTN: BP stable, at goal. Continue Metoprolol, Lasix, ASA     Antipsychotic/Antianxiety/Hypnotic/Psychotropic/Sedation/Antidepressant medications are continued at this time because discontinuation may result in adverse effects or return of concerning behaviors/symptoms.     Plan of care reviewed with Dr Rasta Saez, CNP

## 2020-10-26 ENCOUNTER — OUTSIDE SERVICES (OUTPATIENT)
Dept: FAMILY MEDICINE CLINIC | Age: 70
End: 2020-10-26
Payer: MEDICARE

## 2020-10-26 PROCEDURE — 99308 SBSQ NF CARE LOW MDM 20: CPT | Performed by: NURSE PRACTITIONER

## 2020-11-16 ENCOUNTER — OUTSIDE SERVICES (OUTPATIENT)
Dept: FAMILY MEDICINE CLINIC | Age: 70
End: 2020-11-16
Payer: MEDICARE

## 2020-11-16 PROCEDURE — 99308 SBSQ NF CARE LOW MDM 20: CPT | Performed by: NURSE PRACTITIONER

## 2020-11-16 NOTE — PROGRESS NOTES
NAME: Eric Chi  DATE: 2020  ROOM #: 57-1  CODE STATUS:  Full  REASON FOR VISIT:Follow up of chronic medical conditions  : [birth year]    HPI:  Elderly patient lying in bed, with eyes closed. Without any appearance of distress or pain. Eyes closed for entirety of assessment. There are no new concerns per staff. PMHx: Dementia, HLD, Chronic pain, Seizure disorder, Insomnia, recurrent UTIs   PSHx: Tubal ligation, Tonsillectomy   Social Hx: No tobacco or EtOH   FamHx: unknown     PHYSICAL EXAM   Vital Signs:  T, BP, P, RR, Wt  98. 117/78, 83, 18, 127#  General Appearance: debilitated appearing female in no acute distress   Head: normocephalic and atraumatic   ENT: external ear canal normal. No lip or gum lesions noted    Pulmonary/Chest:No respiratory distress or retractions   Musculoskeletal: No obvious joint swelling or gross deformity   Neuro/Psych: Lethargic. Unable to assess    ASSESSMENT AND PLAN   1. Seizures: No report of recent seizures. Continue lorazepam, valproic acid, and periodic labs. 2. Dementia with Behavioral Disturbances: Due to advancement of dementia, she is no longer having behaviors, spending her time in bed or in gerichair, without ability to perform any ADLs. Continue Aricept, Klonopin, Depakote, and Trazodone     3. Chronic Pain: No appearance of pain. Continue Meloxicam.  4. GERD: Swallowing has improved. Continue Famotidine  5. HTN: BP stable, at goal. Continue Metoprolol, Lasix, ASA     Antipsychotic/Antianxiety/Hypnotic/Psychotropic/Sedation/Antidepressant medications are continued at this time because discontinuation may result in adverse effects or return of concerning behaviors/symptoms.     Plan of care reviewed with Dr Fariba Echeverria, CNP

## 2020-12-21 ENCOUNTER — OUTSIDE SERVICES (OUTPATIENT)
Dept: FAMILY MEDICINE CLINIC | Age: 70
End: 2020-12-21
Payer: MEDICARE

## 2020-12-21 PROCEDURE — 99308 SBSQ NF CARE LOW MDM 20: CPT | Performed by: NURSE PRACTITIONER

## 2021-01-21 VITALS
RESPIRATION RATE: 18 BRPM | HEART RATE: 68 BPM | DIASTOLIC BLOOD PRESSURE: 87 MMHG | WEIGHT: 123 LBS | TEMPERATURE: 97.5 F | SYSTOLIC BLOOD PRESSURE: 138 MMHG | BODY MASS INDEX: 21.79 KG/M2

## 2021-01-21 PROBLEM — F20.9 SCHIZOPHRENIA (HCC): Status: ACTIVE | Noted: 2021-01-21

## 2021-01-21 NOTE — PROGRESS NOTES
Aaron Angeles is a 79 y.o. female that is being seen at [facility] for Dementia      Aaron Angeles  1950  1/21/21      HPI:  Patient seen and examined at bedside. History obtained from patient and chart. Patient is unable to give any history today due to her dementia. Resting comfortably in her wheelchair at breakfast.      I have reviewed the patient's past medical history, past surgical history, allergies,medications, social and family history and I have made updates where appropriate. Past Medical History:   Diagnosis Date    Alzheimer disease ([address]     Anxiety     Aphasia     Bipolar 2 disorder ([address]     CAD (coronary artery disease)     Cerebral artery occlusion with cerebral infarction (HCC)     Chronic pain     Chronic UTI     Contracture of joint of shoulder region, left     Dementia ([address]     Depression     Dysphagia     GERD (gastroesophageal reflux disease)     Hyperlipidemia     Hypertension     Psychiatric problem     Seizures (HCC)        Past Surgical History:   Procedure Laterality Date    TONSILLECTOMY      TUBAL LIGATION         Social History     Tobacco Use    Smoking status: Former Smoker     Types: Cigarettes    Smokeless tobacco: Never Used   Substance Use Topics    Alcohol use: No    Drug use: Yes     Types: Marijuana       Family History   Problem Relation Age of Onset    Other Mother          Review of Systems        PHYSICAL EXAM:    /87   Pulse 68   Temp 97.5 °F (36.4 °C)   Resp 18   Wt 123 lb (55.8 kg)   BMI 21.79 kg/m²       Physical Exam  Vitals signs and nursing note reviewed. Constitutional:       General: She is not in acute distress. Appearance: She is well-developed. HENT:      Head: Normocephalic and atraumatic. Right Ear: Hearing and external ear normal.      Left Ear: Hearing and external ear normal.      Nose: Nose normal.   Eyes:      General:         Right eye: No discharge. Left eye: No discharge. Conjunctiva/sclera: Conjunctivae normal.   Neck:      Musculoskeletal: Normal range of motion and neck supple. Thyroid: No thyromegaly. Trachea: No tracheal deviation. Cardiovascular:      Rate and Rhythm: Normal rate and regular rhythm. Heart sounds: Normal heart sounds. No murmur. No friction rub. No gallop. Pulmonary:      Effort: Pulmonary effort is normal. No respiratory distress. Breath sounds: No wheezing or rales. Chest:      Chest wall: No tenderness. Musculoskeletal:         General: No deformity. Lymphadenopathy:      Cervical: No cervical adenopathy. Skin:     General: Skin is warm and dry. Findings: No erythema or rash. Neurological:      Mental Status: She is alert. Motor: No abnormal muscle tone. Coordination: Coordination normal.   Psychiatric:         Behavior: Behavior is slowed. Cognition and Memory: Memory is impaired. Judgment: Judgment is inappropriate. ASSESSMENT & PLAN  Janeth Peter was seen today for dementia. Diagnoses and all orders for this visit:    Essential hypertension    Schizophrenia, unspecified type ([address]    Alzheimer's dementia with behavioral disturbance, unspecified timing of dementia onset ([address]    Seizure ([address]    Late onset Alzheimer's disease with behavioral disturbance ([address]    Seizure disorder ([address]        1. Dementia with Behavioral Disturbances:  cont Aricept, Klonopin, Trazodone. 2. Chronic Pain:  cont APAP  3. Seizures:  cont Divalproex, PRN Ativan  4. GERD:  cont omeprazole  5. HTN:  cont Metoprolol, Lasix, ASA  6.  Dispo:  stable, reeval in 1 month or PRN

## 2021-01-22 ENCOUNTER — OUTSIDE SERVICES (OUTPATIENT)
Dept: FAMILY MEDICINE CLINIC | Age: 71
End: 2021-01-22
Payer: MEDICARE

## 2021-01-22 DIAGNOSIS — F20.9 SCHIZOPHRENIA, UNSPECIFIED TYPE (HCC): ICD-10-CM

## 2021-01-22 DIAGNOSIS — F02.818 LATE ONSET ALZHEIMER'S DISEASE WITH BEHAVIORAL DISTURBANCE (HCC): Chronic | ICD-10-CM

## 2021-01-22 DIAGNOSIS — F02.81 ALZHEIMER'S DEMENTIA WITH BEHAVIORAL DISTURBANCE, UNSPECIFIED TIMING OF DEMENTIA ONSET: ICD-10-CM

## 2021-01-22 DIAGNOSIS — I10 ESSENTIAL HYPERTENSION: Primary | Chronic | ICD-10-CM

## 2021-01-22 DIAGNOSIS — G40.909 SEIZURE DISORDER (HCC): Chronic | ICD-10-CM

## 2021-01-22 DIAGNOSIS — G30.9 ALZHEIMER'S DEMENTIA WITH BEHAVIORAL DISTURBANCE, UNSPECIFIED TIMING OF DEMENTIA ONSET: ICD-10-CM

## 2021-01-22 DIAGNOSIS — R56.9 SEIZURE (HCC): ICD-10-CM

## 2021-01-22 DIAGNOSIS — G30.1 LATE ONSET ALZHEIMER'S DISEASE WITH BEHAVIORAL DISTURBANCE (HCC): Chronic | ICD-10-CM

## 2021-01-22 PROCEDURE — 99309 SBSQ NF CARE MODERATE MDM 30: CPT | Performed by: FAMILY MEDICINE

## 2021-02-17 ENCOUNTER — ANESTHESIA (OUTPATIENT)
Dept: ENDOSCOPY | Age: 71
End: 2021-02-17
Payer: MEDICARE

## 2021-02-17 ENCOUNTER — ANESTHESIA EVENT (OUTPATIENT)
Dept: ENDOSCOPY | Age: 71
End: 2021-02-17
Payer: MEDICARE

## 2021-02-17 ENCOUNTER — HOSPITAL ENCOUNTER (OUTPATIENT)
Age: 71
Setting detail: OUTPATIENT SURGERY
Discharge: NURSING, REHAB OR LONG-TERM CARE | End: 2021-02-17
Attending: INTERNAL MEDICINE | Admitting: INTERNAL MEDICINE
Payer: MEDICARE

## 2021-02-17 VITALS
SYSTOLIC BLOOD PRESSURE: 102 MMHG | RESPIRATION RATE: 20 BRPM | OXYGEN SATURATION: 91 % | DIASTOLIC BLOOD PRESSURE: 63 MMHG

## 2021-02-17 VITALS
TEMPERATURE: 95.7 F | HEART RATE: 82 BPM | DIASTOLIC BLOOD PRESSURE: 89 MMHG | RESPIRATION RATE: 16 BRPM | OXYGEN SATURATION: 92 % | BODY MASS INDEX: 21.26 KG/M2 | SYSTOLIC BLOOD PRESSURE: 109 MMHG | WEIGHT: 120 LBS

## 2021-02-17 PROCEDURE — 2709999900 HC NON-CHARGEABLE SUPPLY: Performed by: INTERNAL MEDICINE

## 2021-02-17 PROCEDURE — 3700000001 HC ADD 15 MINUTES (ANESTHESIA): Performed by: INTERNAL MEDICINE

## 2021-02-17 PROCEDURE — 7100000011 HC PHASE II RECOVERY - ADDTL 15 MIN: Performed by: INTERNAL MEDICINE

## 2021-02-17 PROCEDURE — 3700000000 HC ANESTHESIA ATTENDED CARE: Performed by: INTERNAL MEDICINE

## 2021-02-17 PROCEDURE — 2580000003 HC RX 258: Performed by: INTERNAL MEDICINE

## 2021-02-17 PROCEDURE — 3609013300 HC EGD TUBE PLACEMENT: Performed by: INTERNAL MEDICINE

## 2021-02-17 PROCEDURE — 2500000003 HC RX 250 WO HCPCS: Performed by: NURSE ANESTHETIST, CERTIFIED REGISTERED

## 2021-02-17 PROCEDURE — 7100000010 HC PHASE II RECOVERY - FIRST 15 MIN: Performed by: INTERNAL MEDICINE

## 2021-02-17 PROCEDURE — 87081 CULTURE SCREEN ONLY: CPT

## 2021-02-17 PROCEDURE — 6360000002 HC RX W HCPCS: Performed by: NURSE ANESTHETIST, CERTIFIED REGISTERED

## 2021-02-17 RX ORDER — PROPOFOL 10 MG/ML
INJECTION, EMULSION INTRAVENOUS PRN
Status: DISCONTINUED | OUTPATIENT
Start: 2021-02-17 | End: 2021-02-17 | Stop reason: SDUPTHER

## 2021-02-17 RX ORDER — SODIUM CHLORIDE 0.9 % (FLUSH) 0.9 %
10 SYRINGE (ML) INJECTION EVERY 12 HOURS SCHEDULED
Status: DISCONTINUED | OUTPATIENT
Start: 2021-02-17 | End: 2021-02-17 | Stop reason: HOSPADM

## 2021-02-17 RX ORDER — SODIUM CHLORIDE 450 MG/100ML
INJECTION, SOLUTION INTRAVENOUS CONTINUOUS
Status: DISCONTINUED | OUTPATIENT
Start: 2021-02-17 | End: 2021-02-17 | Stop reason: HOSPADM

## 2021-02-17 RX ORDER — LIDOCAINE HYDROCHLORIDE 20 MG/ML
INJECTION, SOLUTION INFILTRATION; PERINEURAL PRN
Status: DISCONTINUED | OUTPATIENT
Start: 2021-02-17 | End: 2021-02-17 | Stop reason: SDUPTHER

## 2021-02-17 RX ORDER — SODIUM CHLORIDE 0.9 % (FLUSH) 0.9 %
10 SYRINGE (ML) INJECTION PRN
Status: DISCONTINUED | OUTPATIENT
Start: 2021-02-17 | End: 2021-02-17 | Stop reason: HOSPADM

## 2021-02-17 RX ADMIN — LIDOCAINE HYDROCHLORIDE 80 MG: 20 INJECTION, SOLUTION INFILTRATION; PERINEURAL at 11:46

## 2021-02-17 RX ADMIN — SODIUM CHLORIDE: 4.5 INJECTION, SOLUTION INTRAVENOUS at 11:10

## 2021-02-17 RX ADMIN — PROPOFOL 50 MG: 10 INJECTION, EMULSION INTRAVENOUS at 11:54

## 2021-02-17 RX ADMIN — PROPOFOL 30 MG: 10 INJECTION, EMULSION INTRAVENOUS at 11:49

## 2021-02-17 RX ADMIN — PROPOFOL 50 MG: 10 INJECTION, EMULSION INTRAVENOUS at 11:47

## 2021-02-17 NOTE — ANESTHESIA POSTPROCEDURE EVALUATION
Department of Anesthesiology  Postprocedure Note    Patient: Emma Li  MRN: [id number]  YOB: 1950  Date of evaluation: 2/17/2021  Time:  12:41 PM     Procedure Summary     Date: 02/17/21 Room / Location: [address] [place] [address]    Anesthesia Start: 4898 Anesthesia Stop: 0313    Procedure: EGD PEG TUBE PLACEMENT (N/A Abdomen) Diagnosis: (DYSPHAGIA)    Surgeons: Rubin Hewitt MD Responsible Provider: Luis Enrique Lorenzana MD    Anesthesia Type: MAC ASA Status: 4          Anesthesia Type: MAC    Jared Phase I: Jared Score: 7    Jared Phase II: Jared Score: 8    Last vitals: Reviewed and per EMR flowsheets.        Anesthesia Post Evaluation

## 2021-02-17 NOTE — H&P
[address] [id number]                       PREOPERATIVE HISTORY AND PHYSICAL    PATIENT NAME: Kennedy Johnson                      :        1950  MED REC NO:   [id number]                           ROOM:  ACCOUNT NO:   [de-identified]                           ADMIT DATE: 2021  PROVIDER:     Riya Rivera M.D. PROCEDURE:  EGD with G-tube placement. INDICATION:  The patient is 75-year-old pleasant female who had a  history of cerebrovascular accident with cerebral infarction, coronary  artery disease, bipolar disorder, dementia; who has difficulty  swallowing. The patient has significant decreased oral intake. The  patient is at high risk for protein-calorie malnutrition. The patient  is unable to give much history. I have discussed with the patient's  power of  for healthcare. He wants her to have PEG tube. We  discussed with the patient's power of  for healthcare various  options and he wants her to have the PEG tube placement; for that  reason, the patient is undergoing further evaluation. PAST MEDICAL HISTORY:  Alzheimer dementia, anxiety, bipolar disorder,  coronary artery disease, cerebral artery occlusion with cerebral  infarction, chronic pain, chronic urinary tract infections, contractures  of joints of shoulder, depression, dysphagia, hyperlipidemia,  hypertension, seizure disorder. PAST SURGICAL HISTORY:  Tonsillectomy, tubal ligation. MEDICATIONS:  Reviewed. PHYSICAL EXAMINATION:  VITAL SIGNS:  Temperature 96.6, pulse 84, respiratory rate 20, blood  pressure 110/65. HEART:  Regular. Normal S1 and S2.  LUNGS:  Fair air entry bilaterally. ABDOMEN:  Soft. Normoactive bowel sounds. EXTREMITIES:  The patient had contractures of the extremities. NEUROLOGIC:  The patient had limited communication.     IMPRESSION:  A 75-year-old [facility] female who has Alzheimer dementia, cerebrovascular accident, dysphagia. She is undergoing PEG tube  placement. RECOMMENDATIONS:  Keep the patient nothing by mouth, proceed with EGD  and G-tube placement. I have discussed with the power of  for  healthcare for EGD and G-tube placement, its indications and  complications including but not limited to perforation, bleeding,  infection, adverse reaction to medicine, very slight chance of missing  significant lesions, or may causing damage to the adjacent organs  discussed. Further plans pending the above test results.         Og Callahan M.D.    D: 02/17/2021 11:40:35       T: 02/17/2021 11:47:06     [email or web address]  Job#: [id number]     Doc#: [id number]    CC:

## 2021-02-17 NOTE — ANESTHESIA PRE PROCEDURE
Department of Anesthesiology  Preprocedure Note       Name:  Hector Carney   Age:  79 y.o.  :  1950                                          MRN:  [id number]         Date:  2021      Surgeon: Jerrell Heart):  Larisa Willingham MD    Procedure: Procedure(s):  EGD PEG TUBE PLACEMENT    Medications prior to admission:   Prior to Admission medications    Medication Sig Start Date End Date Taking? Authorizing Provider   valproic acid (DEPACON) 250 MG/5ML SOLN oral solution Take 750 mg by mouth 3 times daily    Historical Provider, MD   Magnesium Hydroxide (MILK OF MAGNESIA PO) Take by mouth    Historical Provider, MD   clonazePAM (KLONOPIN) 0.5 MG tablet Take 0.5 mg by mouth 2 times daily. Historical Provider, MD   famotidine (PEPCID) 20 MG tablet Take 20 mg by mouth 2 times daily    Historical Provider, MD   meloxicam (MOBIC) 7.5 MG tablet Take 7.5 mg by mouth daily    Historical Provider, MD   senna (SENOKOT) 8.8 MG/5ML SYRP syrup Take 5 mLs by mouth nightly    Historical Provider, MD   Donepezil HCl (ARICEPT) 23 MG TABS tablet Take 10 mg by mouth daily     Historical Provider, MD   furosemide (LASIX) 20 MG tablet Take 20 mg by mouth daily    Historical Provider, MD   metoprolol (LOPRESSOR) 25 MG tablet Take 12.5 mg by mouth daily Hold if SBP < 110 or HR < 60    Historical Provider, MD   traZODone (DESYREL) 50 MG tablet Take 100 mg by mouth nightly     Historical Provider, MD   divalproex (DEPAKOTE SPRINKLE) 125 MG capsule Take 500 mg by mouth three times daily     Historical Provider, MD   acetaminophen (TYLENOL) 325 MG tablet Take 650 mg by mouth 3 times daily    Historical Provider, MD   aspirin 81 MG chewable tablet Take 1 tablet by mouth daily 9/9/15   Madeline Camarena MD       Current medications:    No current facility-administered medications for this encounter. Allergies:     Allergies   Allergen Reactions    Pcn [Penicillins]        Problem List:    Patient Active Problem List   Diagnosis Code    Elevated troponin level R77.8    Abnormal ECG R94.31    Essential hypertension I10    Late onset Alzheimer's disease with behavioral disturbance (HCC) G30.1, F02.81    Seizure disorder ([facility] G40.909    Schizophrenia ([facility] F20.9       Past Medical History:        Diagnosis Date    Alzheimer disease ([facility]     Anxiety     Aphasia     Bipolar 2 disorder ([facility]     CAD (coronary artery disease)     Cerebral artery occlusion with cerebral infarction (HCC)     Chronic pain     Chronic UTI     Contracture of joint of shoulder region, left     Dementia (HCC)     Depression     Dysphagia     GERD (gastroesophageal reflux disease)     Hyperlipidemia     Hypertension     Psychiatric problem     Seizures (HCC)        Past Surgical History:        Procedure Laterality Date    TONSILLECTOMY      TUBAL LIGATION         Social History:    Social History     Tobacco Use    Smoking status: Former Smoker     Types: Cigarettes    Smokeless tobacco: Never Used   Substance Use Topics    Alcohol use: No                                Counseling given: Not Answered      Vital Signs (Current): There were no vitals filed for this visit.                                            BP Readings from Last 3 Encounters:   01/21/21 138/87   09/16/20 127/73   05/14/20 119/68       NPO Status:                                                                                 BMI:   Wt Readings from Last 3 Encounters:   01/21/21 123 lb (55.8 kg)   09/16/20 123 lb (55.8 kg)   05/14/20 130 lb (59 kg)     There is no height or weight on file to calculate BMI.    CBC:   Lab Results   Component Value Date    WBC 9.3 03/01/2020    RBC 4.00 03/01/2020    HGB 12.3 03/01/2020    HCT 39.5 03/01/2020    MCV 98.8 03/01/2020    RDW 14.2 06/13/2017     03/01/2020       CMP:   Lab Results   Component Value Date     03/01/2020    K 4.0 03/01/2020     03/01/2020    CO2 25 03/01/2020    BUN 20 03/01/2020    CREATININE 0.6 03/01/2020    LABGLOM >90 03/01/2020    GLUCOSE 95 03/01/2020    PROT 6.8 03/01/2020    CALCIUM 9.1 03/01/2020    BILITOT 0.2 03/01/2020    ALKPHOS 62 03/01/2020    AST 12 03/01/2020    ALT 6 03/01/2020       POC Tests: No results for input(s): POCGLU, POCNA, POCK, POCCL, POCBUN, POCHEMO, POCHCT in the last 72 hours. Coags:   Lab Results   Component Value Date    INR 0.93 11/15/2018    APTT 30.2 11/15/2018       HCG (If Applicable): No results found for: PREGTESTUR, PREGSERUM, HCG, HCGQUANT     ABGs: No results found for: PHART, PO2ART, EVZ0CHW, GPA6VRG, BEART, H1OYSTFZ     Type & Screen (If Applicable):  No results found for: LABABO, LABRH    Drug/Infectious Status (If Applicable):  No results found for: HIV, HEPCAB    COVID-19 Screening (If Applicable): No results found for: COVID19      Anesthesia Evaluation  Patient summary reviewed and Nursing notes reviewed  Airway: Mallampati: II  TM distance: >3 FB   Neck ROM: full  Mouth opening: > = 3 FB Dental:          Pulmonary:normal exam  breath sounds clear to auscultation                             Cardiovascular:  Exercise tolerance: poor (<4 METS),   (+) hypertension:, CAD:,       ECG reviewed  Rhythm: regular  Rate: normal                    Neuro/Psych:   (+) seizures:, CVA:, psychiatric history:depression/anxiety             GI/Hepatic/Renal:   (+) GERD:,           Endo/Other:    (+) : arthritis:., .                 Abdominal:         (-) scaphoid Abdomen: soft. Vascular:                                        Anesthesia Plan      MAC     ASA 4       Induction: intravenous. Anesthetic plan and risks discussed with patient. Plan discussed with CRNA and attending.                   ALBINO Vásquez - LORI   2/17/2021

## 2021-02-17 NOTE — BRIEF OP NOTE
Brief Postoperative Note      Patient: Owen Mata  YOB: 1950  MRN: [id number]    Date of Procedure: 2/17/2021    Pre-Op Diagnosis: DYSPHAGIA    Post-Op Diagnosis: diffuse gastritis , 20 f bard g tube was placed without immediate complications       Procedure(s):  EGD PEG TUBE PLACEMENT    Surgeon(s):  Sugey Choi MD    Assistant:  * No surgical staff found *    Anesthesia: Monitor Anesthesia Care    Estimated Blood Loss (mL): less than 50     Complications: None    Specimens:   ID Type Source Tests Collected by Time Destination   1 : gastic antrum r/o gastitis r/o h pylori  Tissue Stomach OLGA LIDIA TEST Sugey Choi MD 2/17/2021 1202        Implants:  * No implants in log *      Drains: * No LDAs found *    Findings: diffuse gastritis , 20 f bard g tube was placed without immediate complications    Electronically signed by Sugey Choi MD on 2/17/2021 at 12:13 PM

## 2021-02-17 NOTE — PROGRESS NOTES
Patient admitted to Endo room 11. Patient from [facility]. Luster Papa lift used to get her out of her whelechair to the bed. Healthcare POA stopped in earlier to sign consents.

## 2021-02-17 NOTE — PROGRESS NOTES
Patient  in phase 2 drowsy arouses to name call .  Vitals stable peg tube in place report  called to [facility],

## 2021-02-17 NOTE — PROGRESS NOTES
mouth cleansed prior to procedure with h2o2 and sterile water  EGD complete bx taken 1 catina test taken and sent to lab 20 fr peg tube place at 4 cm skin level   pt tolerated procedure well     Scope Number  used.

## 2021-02-18 LAB — UREASE-CLO-C. PYLORI: POSITIVE

## 2021-02-18 NOTE — OP NOTE
[address] [place] [address]                                OPERATIVE REPORT    PATIENT NAME: Leena Kerns                      :        1950  MED REC NO:   [id number]                           ROOM:  ACCOUNT NO:   [de-identified]                           ADMIT DATE: 2021  PROVIDER:     Alex Sung M.D.    Serge Maie OF PROCEDURE:  2021    SURGEON:  Alex Sung MD    PROCEDURE:  EGD with G-tube placement. INDICATION:  The patient is a 72-year-old pleasant female who had  dementia, cerebrovascular accident, who has dysphagia. The patient is  at high risk for protein-calorie malnutrition. The patient is  undergoing EGD and G-tube placement. Please see my brief history for  details and for physical examination. ASA CLASSIFICATION:  As per Anesthesia. Please see Anesthesia note for  details. INSTRUMENTS:  GIF-190 gastroscope, 20-Greek Bard G-tube. PHOTOGRAPHS:  Yes. BIOPSIES:  Yes. ESTIMATED BLOOD LOSS:  Less than 10 mL. Procedure indications and complications including, but not limited to,  perforation, bleeding, infection, adverse reaction to medicine, very  slight chance of missing significant lesions, very slight chance of  causing damage to the adjacent organs discussed with the power of   for healthcare and a written consent was obtained. OPERATIVE PROCEDURE:  The patient was placed in supine position in Endo  room #11. The patient was placed on appropriate monitoring of vitals  including blood pressure, heart rate, and pulse ox. Oropharynx was  sprayed with Cetacaine spray and bite block was placed between maxilla  and mandible. After the adequate total intravenous anesthesia was given  by Anesthesia, the GIF-190 gastroscope was inserted into the oropharynx  and the esophagus was intubated under direct vision.   The scope was advanced down through the stomach into second portion of duodenum. On  careful inspection and on withdrawal of the scope, the duodenum looked  normal as shown in picture number A3. In the antrum and body, there was  diffuse, patchy gastritis noted as shown in picture number A2 and A4. Biopsies obtained for OLGA LIDIA-test.  Proximal and distal esophagus looked  normal as shown in picture number A1. On retroflex, fundus looks normal  and the scope was advanced into the body of the stomach. Left upper  quadrant was transilluminated 2 cm left to the midline and 3 cm below  the left costal margin, and the transillumination was obtained and the  area was sterilized with Betadine under standard sterile condition. Area was prepped and draped. 1% Xylocaine was infiltrated into the  maximum area of transillumination. The tip of the stylet with trocar  noted in the gastric lumen. Stylet was removed with trocar in place. Guidewire was passed through the trocar. Snare was advanced through the  gastroscope. The guidewire was grabbed with snare. Snare, guidewire,  and gastroscope removed out through the oropharynx as a unit. The scope  was removed off the guidewire and 20-Kyrgyz Bard G-tube was advanced  over the guidewire and G-tube was placed by gentle pull method. Internal bumper was secured in good position as shown in picture number  A6 and A7 and external bumper was placed at 4 cm at the skin. Triple  antibiotic ointment applied at the G-tube exit site. The patient  tolerated the procedure well without any immediate complications. Vitals including blood pressure, heart rate, and pulse ox were stable  during the procedure and after the procedure. The patient was  transferred to the recovery room in stable condition. IMPRESSION:  Esophagogastroduodenoscopy showed diffuse gastritis.    Biopsies obtained for OLGA LIDIA-test.  20-Kyrgyz Bard G-tube was placed without any immediate complications. The external bumper was placed at  4 cm at the skin. RECOMMENDATIONS:  Follow the biopsy results. If positive, consider  triple therapy for H. pylori. Start tube feedings in 2 hours. We will  get the nutritional consult for nutritional recommendations. Thank you Dr. Ambrose Flores for letting me to do procedure on the patient. Do  not hesitate to call me if you have any questions. My recommendations  are above.         Chaya Lomas M.D.    D: 02/17/2021 12:13:00       T: 02/17/2021 12:19:36     [email or web address]  Job#: [id number]     Doc#: [id number]    CC:  Janie Acevedo M.D.

## 2021-02-22 ENCOUNTER — OUTSIDE SERVICES (OUTPATIENT)
Dept: FAMILY MEDICINE CLINIC | Age: 71
End: 2021-02-22
Payer: MEDICARE

## 2021-02-22 DIAGNOSIS — R13.10 DYSPHAGIA, UNSPECIFIED TYPE: ICD-10-CM

## 2021-02-22 DIAGNOSIS — F03.91 DEMENTIA WITH BEHAVIORAL DISTURBANCE, UNSPECIFIED DEMENTIA TYPE: ICD-10-CM

## 2021-02-22 PROCEDURE — 99308 SBSQ NF CARE LOW MDM 20: CPT | Performed by: NURSE PRACTITIONER

## 2021-02-23 NOTE — PROGRESS NOTES
NAME: Kevin Bartholomew  DATE: 21  ROOM #: 57-1  CODE STATUS:  Full  REASON FOR VISIT:Follow up of chronic medical conditions  : [birth year]    HPI:  Elderly patient up in her lala chair with eyes closed. Without any appearance of distress or pain. Resting in bed at baseline. Non verbal. Opens eyes. No purposeful movements, with contractures of extremities. Nursing does not have any new concerns. PMHx: Dementia, HLD, Chronic pain, Seizure disorder, Insomnia, recurrent UTIs   PSHx: Tubal ligation, Tonsillectomy   Social Hx: No tobacco or EtOH   FamHx: unknown     PHYSICAL EXAM   Vital Signs:  T, BP, P, RR, Wt  98, 107/70, 87, 18, 121#  General Appearance: debilitated appearing female in no acute distress   Head: normocephalic and atraumatic   ENT: external ear canal normal. No lip or gum lesions noted    Pulmonary/Chest:No respiratory distress or retractions. Lungs diminished throughout. Abdomen:  PEG present with healing surrounding tissue  Musculoskeletal: No obvious joint swelling or gross deformity   Neuro/Psych: Sleepy Unable to assess    ASSESSMENT AND PLAN   1. Seizures: No report of recent seizures. Continue lorazepam, valproic acid, and periodic labs. 2. Dementia with Behavioral Disturbances: Due to advancement of dementia, she has not had behaviors for quite some time due to extensiveness of dementia. Continue Aricept, Klonopin, Depakote, and Trazodone     3. Chronic Pain: No appearance of pain. Continue Meloxicam.  4. GERD: Dysphagia worsened to the point of needing PEG. Continue Famotidine  5. HTN: BP stable, at goal. Continue Metoprolol, Lasix, ASA   6. PEG tube: New. To support nutritional and medication needs. Antipsychotic/Antianxiety/Hypnotic/Psychotropic/Sedation/Antidepressant medications are continued at this time because discontinuation may result in adverse effects or return of concerning behaviors/symptoms.     Plan of care reviewed with Dr Katiana Darnell, CNP

## 2021-03-22 ENCOUNTER — OUTSIDE SERVICES (OUTPATIENT)
Dept: FAMILY MEDICINE CLINIC | Age: 71
End: 2021-03-22
Payer: MEDICARE

## 2021-03-22 DIAGNOSIS — G40.909 SEIZURE DISORDER (HCC): Chronic | ICD-10-CM

## 2021-03-22 PROCEDURE — 99307 SBSQ NF CARE SF MDM 10: CPT | Performed by: NURSE PRACTITIONER

## 2021-04-20 ENCOUNTER — OUTSIDE SERVICES (OUTPATIENT)
Dept: FAMILY MEDICINE CLINIC | Age: 71
End: 2021-04-20
Payer: MEDICARE

## 2021-04-20 DIAGNOSIS — R05.9 COUGH: ICD-10-CM

## 2021-04-20 PROCEDURE — 99308 SBSQ NF CARE LOW MDM 20: CPT | Performed by: NURSE PRACTITIONER

## 2021-04-20 NOTE — PROGRESS NOTES
NAME: Michel Baires  DATE: 21  ROOM #: 57-1  CODE STATUS:  Full  REASON FOR VISIT:Acute for congestion   : [birth year]    HPI:  Elderly patient up in her lala chair with eyes closed. Without any appearance of distress or pain. Nursing reported patient sounding congested earlier today and tachycardic. Afebrile. Without any appearance of distress or pain. Unable to communicate. PMHx: Dementia, HLD, Chronic pain, Seizure disorder, Insomnia, recurrent UTIs   PSHx: Tubal ligation, Tonsillectomy   Social Hx: No tobacco or EtOH   FamHx: unknown     PHYSICAL EXAM   Vital Signs:  T, BP, P, RR, Wt  98, 94/70, 103, 16, 126#  General Appearance: debilitated appearing female in no acute distress   Head: normocephalic and atraumatic   ENT: external ear canal normal. No lip or gum lesions noted    Cardiovascular: Distal pulses present. S1, S2.   Pulmonary/Chest:No respiratory distress or retractions. Lungs diminished throughout. Abdomen:  PEG present. ABS x 4. Musculoskeletal: No obvious joint swelling or gross deformity   Neuro/Psych: Awake, non verbal. Calm. ASSESSMENT AND PLAN   1. Cough/congestion: Lungs diminished, but due to tachycardia, risk factors and report by nursing, obtain a stat CXR, CBC, and BMP  2. Dementia with Behavioral Disturbances: Due to advancement of dementia, she has not had behaviors for quite some time due to debilitation. Continue Aricept, Remeron,Trazodone, Valproic acid, and Trazodone       Antipsychotic/Antianxiety/Hypnotic/Psychotropic/Sedation/Antidepressant medications are continued at this time because discontinuation may result in adverse effects or return of concerning behaviors/symptoms.     Plan of care reviewed with Dr Komal Paredes, CNP

## 2021-04-26 ENCOUNTER — OUTSIDE SERVICES (OUTPATIENT)
Dept: FAMILY MEDICINE CLINIC | Age: 71
End: 2021-04-26
Payer: MEDICARE

## 2021-04-26 DIAGNOSIS — F03.91 DEMENTIA WITH BEHAVIORAL DISTURBANCE, UNSPECIFIED DEMENTIA TYPE: ICD-10-CM

## 2021-04-26 DIAGNOSIS — Z86.69 HX OF SEIZURE DISORDER: ICD-10-CM

## 2021-04-26 PROCEDURE — 99308 SBSQ NF CARE LOW MDM 20: CPT | Performed by: NURSE PRACTITIONER

## 2021-04-26 NOTE — PROGRESS NOTES
NAME: Lacey Flores  DATE: 21  ROOM #: 57-1  CODE STATUS:  Full  REASON FOR VISIT:Follow up of chronic medical conditions  : [birth year]    HPI:  Elderly patient up in her lala chair with eyes closed. Without any appearance of distress or pain. Patient is resting in bed, eyes partially open, no verbalization. No concerns expressed per nursing. PMHx: Dementia, HLD, Chronic pain, Seizure disorder, Insomnia, recurrent UTIs   PSHx: Tubal ligation, Tonsillectomy   Social Hx: No tobacco or EtOH   FamHx: unknown     PHYSICAL EXAM   Vital Signs:  T, BP, P, RR, Wt  98, 118/64, 60, 18, 128#  General Appearance: debilitated appearing female in no acute distress   Head: normocephalic and atraumatic   ENT: external ear canal normal. No lip or gum lesions noted    Cardiovascular: Distal pulses present. S1, S2. Regular rate and rhythm. Pulmonary/Chest:No respiratory distress or retractions. Lungs diminished throughout. Abdomen:  PEG present. ABS x 4. Musculoskeletal: No obvious joint swelling or gross deformity   Neuro/Psych: Awake, non verbal. Calm. ASSESSMENT AND PLAN   1. Seizures: No report of recent seizures. Continue with lorazepam, valproic acid, and periodic labs. 2. Dementia with Behavioral Disturbances: Due to advancement of dementia, she has not had behaviors for quite some time due to debilitation. Continue Aricept, Remeron,Trazodone, Valproic acid, and Trazodone     3. Increased oral secretions: Started on Scopalamine with some improvement. Continue. 4.  Chronic Pain: No appearance of pain. Continue Meloxicam.  5. GERD: Dysphagia worsened to the point of needing PEG. Tolerating TF well. Continue Famotidine  6. HTN: BP stable, at goal. Continue Metoprolol, Lasix, ASA   7. PEG tube: New. To support nutritional and medication needs.      Antipsychotic/Antianxiety/Hypnotic/Psychotropic/Sedation/Antidepressant medications are continued at this time because discontinuation may result in adverse effects or return of concerning behaviors/symptoms.     Plan of care reviewed with Dr Mily Patel, CNP

## 2021-04-29 ENCOUNTER — OUTSIDE SERVICES (OUTPATIENT)
Dept: FAMILY MEDICINE CLINIC | Age: 71
End: 2021-04-29
Payer: MEDICARE

## 2021-04-29 DIAGNOSIS — R05.9 COUGH: ICD-10-CM

## 2021-04-29 PROCEDURE — 99308 SBSQ NF CARE LOW MDM 20: CPT | Performed by: NURSE PRACTITIONER

## 2021-04-29 NOTE — PROGRESS NOTES
NAME: Quentin Robertson  DATE: 21  ROOM #: 57-1  CODE STATUS:  Full  REASON FOR VISIT:Acute follow up to percussion vest use. : [birth year]    HPI:  Elderly patient up in her lala chair with eyes closed. Without any appearance of distress or pain. Patient is resting in bed, eyes closed. Therapy has been using percussion vest on patient and nursing states that she seems to be getting mucus up more now. Afebrile. No new issues. PMHx: Dementia, HLD, Chronic pain, Seizure disorder, Insomnia, recurrent UTIs   PSHx: Tubal ligation, Tonsillectomy   Social Hx: No tobacco or EtOH   FamHx: unknown     ROS: Severe dementia: Unable to assess    PHYSICAL EXAM   Vital Signs:  T, BP, P, RR, Wt  98, 118/64, 60, 18, 128#  General Appearance: debilitated appearing female in no acute distress   Head: normocephalic and atraumatic   ENT: external ear canal normal. No lip or gum lesions noted    Cardiovascular: Distal pulses present. S1, S2. Regular rate and rhythm. Pulmonary/Chest:No respiratory distress or retractions. Lungs diminished throughout with oral secretions noted. Abdomen:  PEG present. ABS x 4. TF.   Musculoskeletal: No obvious joint swelling or gross deformity   Neuro/Psych: Awake, non verbal. Calm. ASSESSMENT AND PLAN   1. Cough/congestion: Intermittent issues. CXR have been clear several times now. Therapy started to use percussion vest and patient is tolerating well, with nursing stating that she has been getting mucus up more often now. She still has occasional wet sounding respirations, however this seems more isolated to upper resp/oropharynx. On scopalamine, which is also helping. Continue percussion vest.   2. Increased oral secretions: Continue Scopalamine.      Plan of care reviewed with Dr Kelby Escobedo, CNP

## 2021-05-28 ENCOUNTER — OUTSIDE SERVICES (OUTPATIENT)
Dept: FAMILY MEDICINE CLINIC | Age: 71
End: 2021-05-28

## 2021-05-28 VITALS
BODY MASS INDEX: 22.85 KG/M2 | HEART RATE: 66 BPM | RESPIRATION RATE: 16 BRPM | SYSTOLIC BLOOD PRESSURE: 107 MMHG | WEIGHT: 129 LBS | TEMPERATURE: 97.9 F | DIASTOLIC BLOOD PRESSURE: 54 MMHG

## 2021-05-28 DIAGNOSIS — I10 ESSENTIAL HYPERTENSION: ICD-10-CM

## 2021-05-28 DIAGNOSIS — R68.89 EXCESSIVE ORAL SECRETIONS: ICD-10-CM

## 2021-05-28 DIAGNOSIS — E46 PROTEIN-CALORIE MALNUTRITION, UNSPECIFIED SEVERITY (HCC): ICD-10-CM

## 2021-05-28 DIAGNOSIS — G30.9 ALZHEIMER'S DEMENTIA WITH BEHAVIORAL DISTURBANCE, UNSPECIFIED TIMING OF DEMENTIA ONSET: ICD-10-CM

## 2021-05-28 DIAGNOSIS — G89.29 OTHER CHRONIC PAIN: ICD-10-CM

## 2021-05-28 DIAGNOSIS — G40.909 SEIZURE DISORDER (HCC): Primary | ICD-10-CM

## 2021-05-28 DIAGNOSIS — F02.81 ALZHEIMER'S DEMENTIA WITH BEHAVIORAL DISTURBANCE, UNSPECIFIED TIMING OF DEMENTIA ONSET: ICD-10-CM

## 2021-05-28 DIAGNOSIS — Z93.1 S/P PERCUTANEOUS ENDOSCOPIC GASTROSTOMY (PEG) TUBE PLACEMENT (HCC): ICD-10-CM

## 2021-06-01 NOTE — PROGRESS NOTES
I was present for the key portions of the exam and history and confirmed all areas of the note with the patient, staff and the resident. ASSESSMENT & PLAN  El Hendrickson was seen today for 1 month follow-up. Diagnoses and all orders for this visit:    Seizure disorder ([address]    Alzheimer's dementia with behavioral disturbance, unspecified timing of dementia onset ([address]    Excessive oral secretions    S/P percutaneous endoscopic gastrostomy (PEG) tube placement ([address]    Protein-calorie malnutrition, unspecified severity ([address]    Other chronic pain    Essential hypertension        No follow-ups on file.       @JUAN JOSE@  Angélica Chambers, 
edema of the lower extremities  Musculoskeletal: No joint swelling or gross deformity   Neuro:  Drowsy. Limited exam due to dementia  Psych: Drowsy. Non verbal.   Skin: warm and dry, no rash or erythema      ASSESSMENT & PLAN  1. Seizure disorder (HCC)  No recent seizures noted. Continue Valproic Acid. Monitor Valproic acid level every 6 months. 2. Alzheimer's dementia with behavioral disturbance, unspecified timing of dementia onset ([facility]  Progressive and severe at this time. Consider stopping aricept at this time as likely not having any further benefit. Patient continues to be full code. -Continue Lorazepam, trazodone. 3. Excessive oral secretions  Continue scopolamine     4. S/P percutaneous endoscopic gastrostomy (PEG) tube placement (HCC)  Continue TF as scheduled. Weight stable. Continue NPO. 5. Protein-calorie malnutrition, unspecified severity ([facility]  Continue TF as scheduled. Weight stable    6. Other chronic pain  Continue tylenol, biofreeze, mobic    7. Essential hypertension  Controlled. Continue metoprolol, lasix.         Electronically signed by Kriss Aleman DO on 5/29/2021 at 9:07 AM

## 2021-06-03 ENCOUNTER — OUTSIDE SERVICES (OUTPATIENT)
Dept: FAMILY MEDICINE CLINIC | Age: 71
End: 2021-06-03
Payer: MEDICARE

## 2021-06-03 DIAGNOSIS — R09.89 ABNORMAL LUNG SOUNDS: ICD-10-CM

## 2021-06-03 DIAGNOSIS — J98.8 CONGESTION OF UPPER AIRWAY: ICD-10-CM

## 2021-06-03 PROCEDURE — 99309 SBSQ NF CARE MODERATE MDM 30: CPT | Performed by: NURSE PRACTITIONER

## 2021-06-03 NOTE — PROGRESS NOTES
NAME: Chico Coley  DATE: 6-3-21  ROOM #: 57-1  CODE STATUS:  Full  REASON FOR VISIT: Acute for congestion  : [birth year]    HPI:  Elderly patient up in her lala chair with eyes closed. Nursing notes patient seems more sleepy and \"different\" today. Afebrile. VSS. She does not appear to be in any pain or distress. PMHx: Dementia, HLD, Chronic pain, Seizure disorder, Insomnia, recurrent UTIs   PSHx: Tubal ligation, Tonsillectomy   Social Hx: No tobacco or EtOH   FamHx: unknown     ROS: Severe dementia: Unable to assess    PHYSICAL EXAM   Vital Signs:  T, BP, P, RR, Wt  98, 103/60, 84, 16, 128#  General Appearance: debilitated appearing female in no acute distress   Head: normocephalic and atraumatic   ENT: external ear canal normal. No lip or gum lesions noted    Cardiovascular: Distal pulses present. S1, S2. Regular rate and rhythm. Pulmonary/Chest:No respiratory distress or retractions. Lungs diminished with faint crackles heard bilat upper lobes and she has slightly moist sounding respirations. Abdomen:  PEG present. ABS x 4. TF.   Musculoskeletal: No obvious joint swelling or gross deformity   Neuro/Psych: Sleepy. Non verbal. Calm. ASSESSMENT AND PLAN   1. Congestion/abrnormal lung sounds: Obtain a stat 2 view CXR. CBC, BMP in the morning as a follow up. 2. Increased oral secretions: Continue Scopalamine.      Plan of care reviewed with Dr Vin Guthrie, CNP

## 2021-06-11 ENCOUNTER — OUTSIDE SERVICES (OUTPATIENT)
Dept: FAMILY MEDICINE CLINIC | Age: 71
End: 2021-06-11
Payer: MEDICARE

## 2021-06-11 DIAGNOSIS — J39.8 CONGESTION OF UPPER RESPIRATORY TRACT: ICD-10-CM

## 2021-06-11 PROCEDURE — 99308 SBSQ NF CARE LOW MDM 20: CPT | Performed by: NURSE PRACTITIONER

## 2021-06-15 NOTE — PROGRESS NOTES
NAME: Yanick Vargas  DATE: 21  ROOM #: 57-1  CODE STATUS:  Full  REASON FOR VISIT: Acute for congestion and increased secretions   : [birth year]    HPI:  Elderly patient up in her lala chair with eyes closed. Patient noted to again have increased secretions with upper respiratory congestion, without abnormal lung sounds. She has repeatedly had this and requires suctioning. There is also question about her TF rate. No distress. PMHx: Dementia, HLD, Chronic pain, Seizure disorder, Insomnia, recurrent UTIs   PSHx: Tubal ligation, Tonsillectomy   Social Hx: No tobacco or EtOH   FamHx: unknown     ROS: Severe dementia: Unable to assess    PHYSICAL EXAM   Vital Signs:  T, BP, P, RR, Wt  98, 122/74, 85, 16, 129#  General Appearance: debilitated appearing female in no acute distress   Head: normocephalic and atraumatic   ENT: external ear canal normal. No lip or gum lesions noted    Cardiovascular: Distal pulses present. S1, S2. Regular rate and rhythm. Pulmonary/Chest:No respiratory distress or retractions. Has we sounding respirations and congestion that is in back of throat. Increased secretions. Abdomen:  PEG present. ABS x 4. TF.   Musculoskeletal: No obvious joint swelling or gross deformity   Neuro/Psych: Sleepy. Non verbal. Calm. ASSESSMENT AND PLAN   1. Congestion[de-identified] Has repeatedly had this, but with increase in recent weeks. She has normal lung sounds, however has a wet sounding respiration and this appears to be from secretions in back of throat. Nursing reports having suctioned a lot of clear secretions. Patient is in a high Esparza's position and in NAD. Nursing to check with nutrition about new recommendations regarding her TF amount and possible changes as this seems to aggravate the secretions. Afebrile and no concern for aspiration at this time. Repeat CXRs have been negative. Continue to monitor and use scopalamine patches.      Plan of care reviewed with Dr Governor Nettles, CNP

## 2021-06-21 ENCOUNTER — OUTSIDE SERVICES (OUTPATIENT)
Dept: FAMILY MEDICINE CLINIC | Age: 71
End: 2021-06-21
Payer: MEDICARE

## 2021-06-21 DIAGNOSIS — F03.91 DEMENTIA WITH BEHAVIORAL DISTURBANCE, UNSPECIFIED DEMENTIA TYPE: ICD-10-CM

## 2021-06-21 PROCEDURE — 99308 SBSQ NF CARE LOW MDM 20: CPT | Performed by: NURSE PRACTITIONER

## 2021-06-21 NOTE — PROGRESS NOTES
nutritional and medication needs. Antipsychotic/Antianxiety/Hypnotic/Psychotropic/Sedation/Antidepressant medications are continued at this time because discontinuation may result in adverse effects or return of concerning behaviors/symptoms.     Plan of care reviewed with Dr Casey Walters, CNP

## 2021-06-26 ENCOUNTER — HOSPITAL ENCOUNTER (EMERGENCY)
Age: 71
Discharge: HOME | End: 2021-06-26
Attending: EMERGENCY MEDICINE
Payer: MEDICARE

## 2021-06-26 ENCOUNTER — APPOINTMENT (OUTPATIENT)
Dept: GENERAL RADIOLOGY | Age: 71
End: 2021-06-26
Payer: MEDICARE

## 2021-06-26 VITALS
SYSTOLIC BLOOD PRESSURE: 130 MMHG | HEART RATE: 72 BPM | RESPIRATION RATE: 18 BRPM | WEIGHT: 140 LBS | TEMPERATURE: 98.7 F | DIASTOLIC BLOOD PRESSURE: 95 MMHG | OXYGEN SATURATION: 100 % | BODY MASS INDEX: 24.8 KG/M2

## 2021-06-26 DIAGNOSIS — J98.8 AIRWAY OBSTRUCTION: Primary | ICD-10-CM

## 2021-06-26 LAB
ALBUMIN SERPL-MCNC: 3.5 G/DL (ref 3.5–5.1)
ALP BLD-CCNC: 53 U/L (ref 38–126)
ALT SERPL-CCNC: 6 U/L (ref 11–66)
ANION GAP SERPL CALCULATED.3IONS-SCNC: 6 MEQ/L (ref 8–16)
AST SERPL-CCNC: 18 U/L (ref 5–40)
BASE EXCESS (CALCULATED): 7.1 MMOL/L (ref -2.5–2.5)
BASOPHILS # BLD: 0.6 %
BASOPHILS ABSOLUTE: 0.1 THOU/MM3 (ref 0–0.1)
BILIRUB SERPL-MCNC: 0.2 MG/DL (ref 0.3–1.2)
BUN BLDV-MCNC: 39 MG/DL (ref 7–22)
CALCIUM SERPL-MCNC: 9.1 MG/DL (ref 8.5–10.5)
CHLORIDE BLD-SCNC: 106 MEQ/L (ref 98–111)
CO2: 30 MEQ/L (ref 23–33)
COLLECTED BY:: ABNORMAL
CREAT SERPL-MCNC: 0.9 MG/DL (ref 0.4–1.2)
EKG ATRIAL RATE: 63 BPM
EKG P AXIS: 64 DEGREES
EKG P-R INTERVAL: 152 MS
EKG Q-T INTERVAL: 384 MS
EKG QRS DURATION: 76 MS
EKG QTC CALCULATION (BAZETT): 392 MS
EKG R AXIS: 31 DEGREES
EKG T AXIS: 63 DEGREES
EKG VENTRICULAR RATE: 63 BPM
EOSINOPHIL # BLD: 1.7 %
EOSINOPHILS ABSOLUTE: 0.1 THOU/MM3 (ref 0–0.4)
ERYTHROCYTE [DISTWIDTH] IN BLOOD BY AUTOMATED COUNT: 15.9 % (ref 11.5–14.5)
ERYTHROCYTE [DISTWIDTH] IN BLOOD BY AUTOMATED COUNT: 56 FL (ref 35–45)
GFR SERPL CREATININE-BSD FRML MDRD: 75 ML/MIN/1.73M2
GLUCOSE BLD-MCNC: 93 MG/DL (ref 70–108)
HCO3: 32 MMOL/L (ref 23–28)
HCT VFR BLD CALC: 38.1 % (ref 37–47)
HEMOGLOBIN: 11.9 GM/DL (ref 12–16)
IMMATURE GRANS (ABS): 0.02 THOU/MM3 (ref 0–0.07)
IMMATURE GRANULOCYTES: 0.2 %
LACTIC ACID: 0.7 MMOL/L (ref 0.5–2)
LYMPHOCYTES # BLD: 44.8 %
LYMPHOCYTES ABSOLUTE: 3.8 THOU/MM3 (ref 1–4.8)
MCH RBC QN AUTO: 30 PG (ref 26–33)
MCHC RBC AUTO-ENTMCNC: 31.2 GM/DL (ref 32.2–35.5)
MCV RBC AUTO: 96 FL (ref 81–99)
MONOCYTES # BLD: 10 %
MONOCYTES ABSOLUTE: 0.8 THOU/MM3 (ref 0.4–1.3)
NUCLEATED RED BLOOD CELLS: 0 /100 WBC
O2 SATURATION: 76 %
OSMOLALITY CALCULATION: 292.2 MOSMOL/KG (ref 275–300)
PCO2: 49 MMHG (ref 35–45)
PH BLOOD GAS: 7.43 (ref 7.35–7.45)
PLATELET # BLD: 146 THOU/MM3 (ref 130–400)
PMV BLD AUTO: 11.5 FL (ref 9.4–12.4)
PO2: 40 MMHG (ref 71–104)
POTASSIUM REFLEX MAGNESIUM: 4.3 MEQ/L (ref 3.5–5.2)
PRO-BNP: 233.5 PG/ML (ref 0–900)
RBC # BLD: 3.97 MILL/MM3 (ref 4.2–5.4)
SEG NEUTROPHILS: 42.7 %
SEGMENTED NEUTROPHILS ABSOLUTE COUNT: 3.6 THOU/MM3 (ref 1.8–7.7)
SODIUM BLD-SCNC: 142 MEQ/L (ref 135–145)
TOTAL PROTEIN: 6.8 G/DL (ref 6.1–8)
WBC # BLD: 8.4 THOU/MM3 (ref 4.8–10.8)

## 2021-06-26 PROCEDURE — 71045 X-RAY EXAM CHEST 1 VIEW: CPT

## 2021-06-26 PROCEDURE — 93005 ELECTROCARDIOGRAM TRACING: CPT | Performed by: NURSE PRACTITIONER

## 2021-06-26 PROCEDURE — 82803 BLOOD GASES ANY COMBINATION: CPT

## 2021-06-26 PROCEDURE — 80053 COMPREHEN METABOLIC PANEL: CPT

## 2021-06-26 PROCEDURE — 83605 ASSAY OF LACTIC ACID: CPT

## 2021-06-26 PROCEDURE — 93010 ELECTROCARDIOGRAM REPORT: CPT | Performed by: INTERNAL MEDICINE

## 2021-06-26 PROCEDURE — 94761 N-INVAS EAR/PLS OXIMETRY MLT: CPT

## 2021-06-26 PROCEDURE — 85025 COMPLETE CBC W/AUTO DIFF WBC: CPT

## 2021-06-26 PROCEDURE — 36415 COLL VENOUS BLD VENIPUNCTURE: CPT

## 2021-06-26 PROCEDURE — 83880 ASSAY OF NATRIURETIC PEPTIDE: CPT

## 2021-06-26 PROCEDURE — 99283 EMERGENCY DEPT VISIT LOW MDM: CPT

## 2021-06-26 PROCEDURE — 36600 WITHDRAWAL OF ARTERIAL BLOOD: CPT

## 2021-06-26 ASSESSMENT — ENCOUNTER SYMPTOMS
SORE THROAT: 0
COUGH: 0
WHEEZING: 0
NAUSEA: 0
COLOR CHANGE: 0
DIARRHEA: 0
CONSTIPATION: 0
SHORTNESS OF BREATH: 0
ABDOMINAL DISTENTION: 0
RHINORRHEA: 0
VOMITING: 0

## 2021-06-26 NOTE — ACP (ADVANCE CARE PLANNING)
Advance Care Planning     Advance Care Planning Activator (Inpatient)  Conversation Note      Date of ACP Conversation: 6/26/2021     Conversation Conducted with: Sari Hammondscott, Legal Guardian    ACP Activator: Carol Ann Tam RN      Health Care Decision Maker:     Current Designated Health Care Decision Maker:     Primary Decision Maker: Daisy Dunbar - [phone]      Care Preferences    Ventilation: \"If you were in your present state of health and suddenly became very ill and were unable to breathe on your own, what would your preference be about the use of a ventilator (breathing machine) if it were available to you? \"      Would the patient desire the use of ventilator (breathing machine)?: no    \"If your health worsens and it becomes clear that your chance of recovery is unlikely, what would your preference be about the use of a ventilator (breathing machine) if it were available to you? \"     Would the patient desire the use of ventilator (breathing machine)?: No      Resuscitation  \"CPR works best to restart the heart when there is a sudden event, like a heart attack, in someone who is otherwise healthy. Unfortunately, CPR does not typically restart the heart for people who have serious health conditions or who are very sick. \"    \"In the event your heart stopped as a result of an underlying serious health condition, would you want attempts to be made to restart your heart (answer \"yes\" for attempt to resuscitate) or would you prefer a natural death (answer \"no\" for do not attempt to resuscitate)? \" no       [] Yes   [] No   Educated Patient / Lacy Stewart regarding differences between Advance Directives and portable DNR orders.     Length of ACP Conversation in minutes:  25  Conversation Outcomes:  [x] ACP discussion completed  [] Existing advance directive reviewed with patient; no changes to patient's previously recorded wishes  [] New Advance Directive completed  [x] Portable Do Not Rescitate prepared for Provider review and signature  [] POLST/POST/MOLST/MOST prepared for Provider review and signature      Follow-up plan:    [] Schedule follow-up conversation to continue planning  [] Referred individual to Provider for additional questions/concerns   [x] Advised patient/agent/surrogate to review completed ACP document and update if needed with changes in condition, patient preferences or care setting    [x] This note routed to one or more involved healthcare providers         Pt came into ED from [facility] via EMS. SOB low pox. Met with guardian in ED-19 to discuss ACP. He wishes to keep her comfortable but does not want cpr or vent. He states her quality of life is poor due to the dementia and eats via feeding tube. As there is no back up decision maker asked who would be the next best thing. Estrella Webb recommended [employer] . Guardian just lost his wife and buried her on the 14th.

## 2021-06-26 NOTE — ED TRIAGE NOTES
Pt to ED via EMS from nursing home w/rprts per Valentín batista LPN at [facility] of low pulse oxygen; 78% on 5L. Pt in non-verbal, hx of CVA and dementia, peg tube. Pt reactive to painful stimuli only per baseline. Pt placed on monitor; pulse oxygen noted of 97% on RA.

## 2021-06-26 NOTE — ED NOTES
Pt's guardian at bedside, requesting an update. Dr. Joe Shane updated.       Ramses Nobles RN  06/26/21 8905

## 2021-06-26 NOTE — ED NOTES
Pt resting. Eyes open. No visible signs of distress noted. Pulse ox 100% on RA. [place] [address] provided. Will continue to monitor for safety and comfort.       Brett Mccarty, RN  06/26/21 8200

## 2021-06-26 NOTE — ED NOTES
LACP at bedside to transport back to nursing home. Unable to call report to nursing home; no answer per staff.            Jose M Cunningham, AVANI  06/26/21 4100

## 2021-06-26 NOTE — ED NOTES
Artis Gonzalez,  at bedside w/guardian and pt to obtain [facility] no intubation.       Yuan Mcdowell, AVANI  06/26/21 1677

## 2021-06-26 NOTE — ED PROVIDER NOTES
[facility] ENCOUNTER          Pt Name: Chayo Haywood  MRN: [id number]  [place] 1950  Date of evaluation: 6/26/2021  Treating Resident Physician: Gabriela Hong MD  Supervising Physician: Roland Jimenez, [address] [place] [address]       Chief Complaint   Patient presents with    Shortness of Breath     Low pulse oxygen     History obtained from the patient. HISTORY OF PRESENT ILLNESS    HPI  Chayo Haywood is a 70 y.o. female who presents to the emergency department for evaluation of pulse oximetry in 70's at nursing home. At arrival to ED patient noted with pulse ox 94% on RA. There was also reported gurgling noise when she was breathing prior to arrival. Patient is nonverbal and is bed bound at nursing home. Patient step father is her next of kin who makes decision for her and is at bedside at this time. The patient has no other acute complaints at this time. REVIEW OF SYSTEMS   Review of Systems   Constitutional: Negative for fatigue and fever. HENT: Negative for ear pain, rhinorrhea and sore throat. Respiratory: Negative for cough, shortness of breath and wheezing. Shortness of breath    Cardiovascular: Negative for chest pain, palpitations and leg swelling. Gastrointestinal: Negative for abdominal distention, constipation, diarrhea, nausea and vomiting. Endocrine: Negative for polydipsia and polyuria. Genitourinary: Negative for difficulty urinating and dysuria. Musculoskeletal: Negative for arthralgias. Skin: Negative for color change, pallor and rash. Neurological: Negative for dizziness, seizures, syncope, weakness and numbness.          PAST MEDICAL AND SURGICAL HISTORY     Past Medical History:   Diagnosis Date    Alzheimer disease ([facility]     Anxiety     Aphasia     Bipolar 2 disorder ([facility]     CAD (coronary artery disease)     Cerebral artery occlusion with cerebral infarction (HCC)     Chronic pain     Chronic UTI     Contracture of joint of shoulder region, left     Dementia (HCC)     Depression     Dysphagia     GERD (gastroesophageal reflux disease)     Hyperlipidemia     Hypertension     Psychiatric problem     Seizures ([facility]      Past Surgical History:   Procedure Laterality Date    GASTROSTOMY TUBE PLACEMENT N/A 2/17/2021    EGD PEG TUBE PLACEMENT performed by Uvaldo Quinones MD at [address] [place] [address]   No current facility-administered medications for this encounter. Current Outpatient Medications:     valproic acid (DEPACON) 250 MG/5ML SOLN oral solution, Take 750 mg by mouth 3 times daily, Disp: , Rfl:     Magnesium Hydroxide (MILK OF MAGNESIA PO), Take by mouth, Disp: , Rfl:     clonazePAM (KLONOPIN) 0.5 MG tablet, Take 0.5 mg by mouth 2 times daily. , Disp: , Rfl:     famotidine (PEPCID) 20 MG tablet, Take 20 mg by mouth 2 times daily, Disp: , Rfl:     meloxicam (MOBIC) 7.5 MG tablet, Take 7.5 mg by mouth daily, Disp: , Rfl:     senna (SENOKOT) 8.8 MG/5ML SYRP syrup, Take 5 mLs by mouth nightly, Disp: , Rfl:     Donepezil HCl (ARICEPT) 23 MG TABS tablet, Take 10 mg by mouth daily , Disp: , Rfl:     furosemide (LASIX) 20 MG tablet, Take 20 mg by mouth daily, Disp: , Rfl:     metoprolol (LOPRESSOR) 25 MG tablet, Take 12.5 mg by mouth daily Hold if SBP < 110 or HR < 60, Disp: , Rfl:     traZODone (DESYREL) 50 MG tablet, Take 100 mg by mouth nightly , Disp: , Rfl:     divalproex (DEPAKOTE SPRINKLE) 125 MG capsule, Take 500 mg by mouth three times daily , Disp: , Rfl:     acetaminophen (TYLENOL) 325 MG tablet, Take 650 mg by mouth 3 times daily, Disp: , Rfl:     aspirin 81 MG chewable tablet, Take 1 tablet by mouth daily, Disp: 30 tablet, Rfl: 3      SOCIAL HISTORY     Social History     Social History Narrative    Not on file     Social History     Tobacco Use    Smoking status: Former Smoker     Types: Cigarettes    Smokeless tobacco: Never Used   Vaping Use    Vaping Use: Never assessed   Substance Use Topics    Alcohol use: No    Drug use: Yes     Types: Marijuana         ALLERGIES     Allergies   Allergen Reactions    Pcn [Penicillins]          FAMILY HISTORY     Family History   Problem Relation Age of Onset    Other Mother          PREVIOUS RECORDS   Previous records reviewed: today    PHYSICAL EXAM     ED Triage Vitals [06/26/21 0833]   BP Temp Temp Source Pulse Resp SpO2 Height Weight   122/85 98.7 °F (37.1 °C) Axillary 72 20 96 % -- 140 lb (63.5 kg)     Initial vital signs and nursing assessment reviewed and normal. Body mass index is 24.8 kg/m². Pulsoximetry is normal per my interpretation. Additional Vital Signs:  Vitals:    06/26/21 1700   BP: (!) 130/95   Pulse:    Resp: 18   Temp:    SpO2:        Physical Exam  Constitutional:       Appearance: Normal appearance. HENT:      Head: Normocephalic. Right Ear: External ear normal.      Left Ear: External ear normal.      Nose: Nose normal.      Mouth/Throat:      Mouth: Mucous membranes are moist.      Pharynx: Oropharynx is clear. Eyes:      Extraocular Movements: Extraocular movements intact. Conjunctiva/sclera: Conjunctivae normal.      Pupils: Pupils are equal, round, and reactive to light. Cardiovascular:      Rate and Rhythm: Normal rate and regular rhythm. Pulses: Normal pulses. Heart sounds: Normal heart sounds. Pulmonary:      Effort: Pulmonary effort is normal. No tachypnea, bradypnea, accessory muscle usage or respiratory distress. Breath sounds: Normal breath sounds. No stridor. Abdominal:      General: Bowel sounds are normal.      Palpations: Abdomen is soft. Musculoskeletal:         General: Normal range of motion. Cervical back: Normal range of motion and neck supple. Skin:     General: Skin is warm and dry. Capillary Refill: Capillary refill takes less than 2 seconds.    Neurological:      General: No focal deficit present. Mental Status: She is alert and oriented to person, place, and time. MEDICAL DECISION MAKING   Initial Assessment:   Patient is a 77yo female who came from nursing with concerns of low pulse ox and gurgling noise heard by staff. Patient on arrival pulse Ox was 94% and maintaining pulse ox >92% consistently while in ED. Patient likely had airway obstruction from mucous that was resolved with repositioning. There are no airway noises noted and no hypoxia. CXRay showed no acute abnormalities. Patient will be discharged to nursing home at this time. Plan:   Patient will be discharged to nursing home       ED RESULTS   Laboratory results:  Labs Reviewed   CBC WITH AUTO DIFFERENTIAL - Abnormal; Notable for the following components:       Result Value    RBC 3.97 (*)     Hemoglobin 11.9 (*)     MCHC 31.2 (*)     RDW-CV 15.9 (*)     RDW-SD 56.0 (*)     All other components within normal limits   COMPREHENSIVE METABOLIC PANEL W/ REFLEX TO MG FOR LOW K - Abnormal; Notable for the following components:    BUN 39 (*)     Total Bilirubin 0.2 (*)     ALT 6 (*)     All other components within normal limits   BLOOD GAS, ARTERIAL - Abnormal; Notable for the following components:    PCO2 49 (*)     PO2 40 (*)     HCO3 32 (*)     Base Excess (Calculated) 7.1 (*)     All other components within normal limits   ANION GAP - Abnormal; Notable for the following components:    Anion Gap 6.0 (*)     All other components within normal limits   GLOMERULAR FILTRATION RATE, ESTIMATED - Abnormal; Notable for the following components:    Est, Glom Filt Rate 75 (*)     All other components within normal limits   BRAIN NATRIURETIC PEPTIDE   LACTIC ACID, PLASMA   OSMOLALITY   URINALYSIS WITH MICROSCOPIC       Radiologic studies results:  XR CHEST PORTABLE   Final Result   Stable radiographic appearance of the chest. No evidence of an acute process.                **This report has been created using voice recognition software. It may contain minor errors which are inherent in voice recognition technology. **      Final report electronically signed by Dr. Jordana Aguayo MD on 6/26/2021 9:04 AM          ED Medications administered this visit: Medications - No data to display      ED COURSE        Strict return precautions and follow up instructions were discussed with the patient prior to discharge, with which the patient agrees. MEDICATION CHANGES     New Prescriptions    No medications on file         FINAL DISPOSITION     Final diagnoses:   Airway obstruction     Condition: condition: good  Dispo: Discharge to home      This transcription was electronically signed. Parts of this transcriptions may have been dictated by use of voice recognition software and electronically transcribed, and parts may have been transcribed with the assistance of an ED scribe. The transcription may contain errors not detected in proofreading. Please refer to my supervising physician's documentation if my documentation differs.     Electronically Signed: Tegan Babin MD, 06/26/21, 6:01 PM       Tegan Babin MD  Resident  06/26/21 4037

## 2021-06-28 ENCOUNTER — OUTSIDE SERVICES (OUTPATIENT)
Dept: FAMILY MEDICINE CLINIC | Age: 71
End: 2021-06-28
Payer: MEDICARE

## 2021-06-28 DIAGNOSIS — J96.20 ACUTE ON CHRONIC RESPIRATORY FAILURE, UNSPECIFIED WHETHER WITH HYPOXIA OR HYPERCAPNIA (HCC): ICD-10-CM

## 2021-06-28 DIAGNOSIS — D68.9 COAGULATION DEFECT (HCC): Primary | ICD-10-CM

## 2021-06-28 PROCEDURE — 99308 SBSQ NF CARE LOW MDM 20: CPT | Performed by: NURSE PRACTITIONER

## 2021-07-18 ENCOUNTER — APPOINTMENT (OUTPATIENT)
Dept: CT IMAGING | Age: 71
DRG: 871 | End: 2021-07-18
Payer: MEDICARE

## 2021-07-18 ENCOUNTER — APPOINTMENT (OUTPATIENT)
Dept: GENERAL RADIOLOGY | Age: 71
DRG: 871 | End: 2021-07-18
Payer: MEDICARE

## 2021-07-18 ENCOUNTER — HOSPITAL ENCOUNTER (INPATIENT)
Age: 71
LOS: 3 days | Discharge: NURSING, REHAB OR LONG-TERM CARE | DRG: 871 | End: 2021-07-21
Attending: EMERGENCY MEDICINE | Admitting: HOSPITALIST
Payer: MEDICARE

## 2021-07-18 DIAGNOSIS — J18.9 SEPSIS DUE TO PNEUMONIA (HCC): Primary | ICD-10-CM

## 2021-07-18 DIAGNOSIS — G30.1 LATE ONSET ALZHEIMER'S DISEASE WITH BEHAVIORAL DISTURBANCE (HCC): Chronic | ICD-10-CM

## 2021-07-18 DIAGNOSIS — J69.0 ASPIRATION PNEUMONIA OF BOTH LUNGS, UNSPECIFIED ASPIRATION PNEUMONIA TYPE, UNSPECIFIED PART OF LUNG (HCC): ICD-10-CM

## 2021-07-18 DIAGNOSIS — A41.9 SEPSIS DUE TO PNEUMONIA (HCC): Primary | ICD-10-CM

## 2021-07-18 DIAGNOSIS — F02.818 LATE ONSET ALZHEIMER'S DISEASE WITH BEHAVIORAL DISTURBANCE (HCC): Chronic | ICD-10-CM

## 2021-07-18 LAB
ALBUMIN SERPL-MCNC: 3.4 G/DL (ref 3.5–5.1)
ALLEN TEST: POSITIVE
ALLEN TEST: POSITIVE
ALP BLD-CCNC: 62 U/L (ref 38–126)
ALT SERPL-CCNC: 7 U/L (ref 11–66)
ANION GAP SERPL CALCULATED.3IONS-SCNC: 15 MEQ/L (ref 8–16)
APTT: 35 SECONDS (ref 22–38)
AST SERPL-CCNC: 17 U/L (ref 5–40)
BACTERIA: ABNORMAL /HPF
BASE EXCESS (CALCULATED): 2.1 MMOL/L (ref -2.5–2.5)
BASE EXCESS (CALCULATED): 2.9 MMOL/L (ref -2.5–2.5)
BASOPHILS # BLD: 0.2 %
BASOPHILS ABSOLUTE: 0 THOU/MM3 (ref 0–0.1)
BILIRUB SERPL-MCNC: 0.4 MG/DL (ref 0.3–1.2)
BILIRUBIN URINE: NEGATIVE
BLOOD, URINE: NEGATIVE
BUN BLDV-MCNC: 31 MG/DL (ref 7–22)
CALCIUM SERPL-MCNC: 9.5 MG/DL (ref 8.5–10.5)
CASTS 2: ABNORMAL /LPF
CASTS UA: ABNORMAL /LPF
CHARACTER, URINE: ABNORMAL
CHLORIDE BLD-SCNC: 103 MEQ/L (ref 98–111)
CO2: 24 MEQ/L (ref 23–33)
COLLECTED BY:: ABNORMAL
COLLECTED BY:: ABNORMAL
COLOR: ABNORMAL
CREAT SERPL-MCNC: 0.9 MG/DL (ref 0.4–1.2)
CRYSTALS, UA: ABNORMAL
DEVICE: ABNORMAL
DEVICE: ABNORMAL
EKG ATRIAL RATE: 106 BPM
EKG ATRIAL RATE: 82 BPM
EKG P AXIS: 58 DEGREES
EKG P AXIS: 67 DEGREES
EKG P-R INTERVAL: 132 MS
EKG P-R INTERVAL: 146 MS
EKG Q-T INTERVAL: 282 MS
EKG Q-T INTERVAL: 336 MS
EKG QRS DURATION: 82 MS
EKG QRS DURATION: 86 MS
EKG QTC CALCULATION (BAZETT): 374 MS
EKG QTC CALCULATION (BAZETT): 392 MS
EKG R AXIS: -10 DEGREES
EKG R AXIS: 7 DEGREES
EKG T AXIS: 61 DEGREES
EKG T AXIS: 77 DEGREES
EKG VENTRICULAR RATE: 106 BPM
EKG VENTRICULAR RATE: 82 BPM
EOSINOPHIL # BLD: 0 %
EOSINOPHILS ABSOLUTE: 0 THOU/MM3 (ref 0–0.4)
EPITHELIAL CELLS, UA: ABNORMAL /HPF
ERYTHROCYTE [DISTWIDTH] IN BLOOD BY AUTOMATED COUNT: 15.9 % (ref 11.5–14.5)
ERYTHROCYTE [DISTWIDTH] IN BLOOD BY AUTOMATED COUNT: 56.6 FL (ref 35–45)
FLU A ANTIGEN: NEGATIVE
FLU B ANTIGEN: NEGATIVE
GFR SERPL CREATININE-BSD FRML MDRD: 75 ML/MIN/1.73M2
GLUCOSE BLD-MCNC: 173 MG/DL (ref 70–108)
GLUCOSE URINE: NEGATIVE MG/DL
GLUCOSE, WHOLE BLOOD: 116 MG/DL (ref 70–108)
HCO3: 26 MMOL/L (ref 23–28)
HCO3: 27 MMOL/L (ref 23–28)
HCT VFR BLD CALC: 39.9 % (ref 37–47)
HEMOGLOBIN: 12.4 GM/DL (ref 12–16)
IFIO2: 10
IFIO2: 100
IMMATURE GRANS (ABS): 0.1 THOU/MM3 (ref 0–0.07)
IMMATURE GRANULOCYTES: 0.7 %
INR BLD: 1.21 (ref 0.85–1.13)
KETONES, URINE: ABNORMAL
LACTIC ACID: 1.6 MMOL/L (ref 0.5–2)
LEUKOCYTE ESTERASE, URINE: NEGATIVE
LYMPHOCYTES # BLD: 13.4 %
LYMPHOCYTES ABSOLUTE: 2 THOU/MM3 (ref 1–4.8)
MCH RBC QN AUTO: 30 PG (ref 26–33)
MCHC RBC AUTO-ENTMCNC: 31.1 GM/DL (ref 32.2–35.5)
MCV RBC AUTO: 96.6 FL (ref 81–99)
MISCELLANEOUS 2: ABNORMAL
MONOCYTES # BLD: 9.9 %
MONOCYTES ABSOLUTE: 1.5 THOU/MM3 (ref 0.4–1.3)
NITRITE, URINE: NEGATIVE
NUCLEATED RED BLOOD CELLS: 0 /100 WBC
O2 SATURATION: 97 %
O2 SATURATION: 99 %
OSMOLALITY CALCULATION: 293.8 MOSMOL/KG (ref 275–300)
PCO2: 39 MMHG (ref 35–45)
PCO2: 39 MMHG (ref 35–45)
PH BLOOD GAS: 7.44 (ref 7.35–7.45)
PH BLOOD GAS: 7.45 (ref 7.35–7.45)
PH UA: 5 (ref 5–9)
PLATELET # BLD: 128 THOU/MM3 (ref 130–400)
PMV BLD AUTO: 11.6 FL (ref 9.4–12.4)
PO2: 119 MMHG (ref 71–104)
PO2: 91 MMHG (ref 71–104)
POTASSIUM SERPL-SCNC: 4 MEQ/L (ref 3.5–5.2)
PRO-BNP: 2355 PG/ML (ref 0–900)
PROCALCITONIN: 0.43 NG/ML (ref 0.01–0.09)
PROTEIN UA: 100
RBC # BLD: 4.13 MILL/MM3 (ref 4.2–5.4)
RBC URINE: ABNORMAL /HPF
RENAL EPITHELIAL, UA: ABNORMAL
SARS-COV-2, NAAT: NOT DETECTED
SEG NEUTROPHILS: 75.8 %
SEGMENTED NEUTROPHILS ABSOLUTE COUNT: 11.2 THOU/MM3 (ref 1.8–7.7)
SODIUM BLD-SCNC: 142 MEQ/L (ref 135–145)
SOURCE, BLOOD GAS: ABNORMAL
SOURCE, BLOOD GAS: ABNORMAL
SPECIFIC GRAVITY, URINE: 1.03 (ref 1–1.03)
TOTAL PROTEIN: 7.5 G/DL (ref 6.1–8)
TROPONIN T: 0.01 NG/ML
TROPONIN T: 0.01 NG/ML
TROPONIN T: < 0.01 NG/ML
UROBILINOGEN, URINE: 1 EU/DL (ref 0–1)
WBC # BLD: 14.8 THOU/MM3 (ref 4.8–10.8)
WBC UA: ABNORMAL /HPF
YEAST: ABNORMAL

## 2021-07-18 PROCEDURE — 36600 WITHDRAWAL OF ARTERIAL BLOOD: CPT

## 2021-07-18 PROCEDURE — 6360000002 HC RX W HCPCS

## 2021-07-18 PROCEDURE — 71045 X-RAY EXAM CHEST 1 VIEW: CPT

## 2021-07-18 PROCEDURE — 87635 SARS-COV-2 COVID-19 AMP PRB: CPT

## 2021-07-18 PROCEDURE — 6360000002 HC RX W HCPCS: Performed by: EMERGENCY MEDICINE

## 2021-07-18 PROCEDURE — 36415 COLL VENOUS BLD VENIPUNCTURE: CPT

## 2021-07-18 PROCEDURE — 2580000003 HC RX 258

## 2021-07-18 PROCEDURE — 87040 BLOOD CULTURE FOR BACTERIA: CPT

## 2021-07-18 PROCEDURE — 83605 ASSAY OF LACTIC ACID: CPT

## 2021-07-18 PROCEDURE — 87641 MR-STAPH DNA AMP PROBE: CPT

## 2021-07-18 PROCEDURE — 6370000000 HC RX 637 (ALT 250 FOR IP): Performed by: HOSPITALIST

## 2021-07-18 PROCEDURE — 85025 COMPLETE CBC W/AUTO DIFF WBC: CPT

## 2021-07-18 PROCEDURE — 70450 CT HEAD/BRAIN W/O DYE: CPT

## 2021-07-18 PROCEDURE — 84484 ASSAY OF TROPONIN QUANT: CPT

## 2021-07-18 PROCEDURE — 6370000000 HC RX 637 (ALT 250 FOR IP): Performed by: FAMILY MEDICINE

## 2021-07-18 PROCEDURE — 93010 ELECTROCARDIOGRAM REPORT: CPT | Performed by: NUCLEAR MEDICINE

## 2021-07-18 PROCEDURE — 87804 INFLUENZA ASSAY W/OPTIC: CPT

## 2021-07-18 PROCEDURE — 94761 N-INVAS EAR/PLS OXIMETRY MLT: CPT

## 2021-07-18 PROCEDURE — 96361 HYDRATE IV INFUSION ADD-ON: CPT

## 2021-07-18 PROCEDURE — 80053 COMPREHEN METABOLIC PANEL: CPT

## 2021-07-18 PROCEDURE — 2580000003 HC RX 258: Performed by: EMERGENCY MEDICINE

## 2021-07-18 PROCEDURE — 93005 ELECTROCARDIOGRAM TRACING: CPT | Performed by: HOSPITALIST

## 2021-07-18 PROCEDURE — 6360000004 HC RX CONTRAST MEDICATION: Performed by: FAMILY MEDICINE

## 2021-07-18 PROCEDURE — 85730 THROMBOPLASTIN TIME PARTIAL: CPT

## 2021-07-18 PROCEDURE — 6370000000 HC RX 637 (ALT 250 FOR IP): Performed by: EMERGENCY MEDICINE

## 2021-07-18 PROCEDURE — 99223 1ST HOSP IP/OBS HIGH 75: CPT | Performed by: HOSPITALIST

## 2021-07-18 PROCEDURE — 2060000000 HC ICU INTERMEDIATE R&B

## 2021-07-18 PROCEDURE — 84145 PROCALCITONIN (PCT): CPT

## 2021-07-18 PROCEDURE — 93005 ELECTROCARDIOGRAM TRACING: CPT | Performed by: EMERGENCY MEDICINE

## 2021-07-18 PROCEDURE — 99285 EMERGENCY DEPT VISIT HI MDM: CPT

## 2021-07-18 PROCEDURE — 85610 PROTHROMBIN TIME: CPT

## 2021-07-18 PROCEDURE — 83880 ASSAY OF NATRIURETIC PEPTIDE: CPT

## 2021-07-18 PROCEDURE — 96365 THER/PROPH/DIAG IV INF INIT: CPT

## 2021-07-18 PROCEDURE — 71275 CT ANGIOGRAPHY CHEST: CPT

## 2021-07-18 PROCEDURE — 2580000003 HC RX 258: Performed by: HOSPITALIST

## 2021-07-18 PROCEDURE — 82803 BLOOD GASES ANY COMBINATION: CPT

## 2021-07-18 PROCEDURE — 6360000002 HC RX W HCPCS: Performed by: HOSPITALIST

## 2021-07-18 PROCEDURE — 81001 URINALYSIS AUTO W/SCOPE: CPT

## 2021-07-18 PROCEDURE — 82947 ASSAY GLUCOSE BLOOD QUANT: CPT

## 2021-07-18 RX ORDER — MIRTAZAPINE 15 MG/1
7.5 TABLET, FILM COATED ORAL NIGHTLY
Status: ON HOLD | COMMUNITY
End: 2021-07-18

## 2021-07-18 RX ORDER — SODIUM CHLORIDE 0.9 % (FLUSH) 0.9 %
5-40 SYRINGE (ML) INJECTION PRN
Status: DISCONTINUED | OUTPATIENT
Start: 2021-07-18 | End: 2021-07-21 | Stop reason: HOSPADM

## 2021-07-18 RX ORDER — ACETAMINOPHEN 500 MG
1000 TABLET ORAL 3 TIMES DAILY
COMMUNITY

## 2021-07-18 RX ORDER — VALPROIC ACID 250 MG/5ML
750 SOLUTION ORAL 3 TIMES DAILY
Status: DISCONTINUED | OUTPATIENT
Start: 2021-07-18 | End: 2021-07-18 | Stop reason: DRUGHIGH

## 2021-07-18 RX ORDER — FUROSEMIDE 10 MG/ML
20 SOLUTION ORAL DAILY
COMMUNITY

## 2021-07-18 RX ORDER — ASPIRIN 81 MG/1
81 TABLET, CHEWABLE ORAL DAILY
Status: DISCONTINUED | OUTPATIENT
Start: 2021-07-18 | End: 2021-07-21 | Stop reason: HOSPADM

## 2021-07-18 RX ORDER — ACETAMINOPHEN 325 MG/1
650 TABLET ORAL 3 TIMES DAILY
Status: DISCONTINUED | OUTPATIENT
Start: 2021-07-18 | End: 2021-07-21 | Stop reason: HOSPADM

## 2021-07-18 RX ORDER — FAMOTIDINE 20 MG/1
20 TABLET, FILM COATED ORAL 2 TIMES DAILY
Status: DISCONTINUED | OUTPATIENT
Start: 2021-07-18 | End: 2021-07-18 | Stop reason: DRUGHIGH

## 2021-07-18 RX ORDER — FAMOTIDINE 20 MG/1
20 TABLET, FILM COATED ORAL DAILY
Status: DISCONTINUED | OUTPATIENT
Start: 2021-07-18 | End: 2021-07-21 | Stop reason: HOSPADM

## 2021-07-18 RX ORDER — LORAZEPAM 2 MG/ML
0.5 CONCENTRATE ORAL EVERY 8 HOURS
Status: DISCONTINUED | OUTPATIENT
Start: 2021-07-18 | End: 2021-07-21 | Stop reason: HOSPADM

## 2021-07-18 RX ORDER — DONEPEZIL HYDROCHLORIDE 10 MG/1
10 TABLET, FILM COATED ORAL DAILY
Status: DISCONTINUED | OUTPATIENT
Start: 2021-07-18 | End: 2021-07-21 | Stop reason: HOSPADM

## 2021-07-18 RX ORDER — 0.9 % SODIUM CHLORIDE 0.9 %
30 INTRAVENOUS SOLUTION INTRAVENOUS ONCE
Status: COMPLETED | OUTPATIENT
Start: 2021-07-18 | End: 2021-07-18

## 2021-07-18 RX ORDER — LEVOFLOXACIN 5 MG/ML
750 INJECTION, SOLUTION INTRAVENOUS EVERY 24 HOURS
Status: DISCONTINUED | OUTPATIENT
Start: 2021-07-18 | End: 2021-07-18

## 2021-07-18 RX ORDER — IPRATROPIUM BROMIDE AND ALBUTEROL SULFATE 2.5; .5 MG/3ML; MG/3ML
1 SOLUTION RESPIRATORY (INHALATION) EVERY 4 HOURS PRN
COMMUNITY

## 2021-07-18 RX ORDER — TRAZODONE HYDROCHLORIDE 100 MG/1
100 TABLET ORAL NIGHTLY
Status: DISCONTINUED | OUTPATIENT
Start: 2021-07-18 | End: 2021-07-21 | Stop reason: HOSPADM

## 2021-07-18 RX ORDER — LORAZEPAM 2 MG/ML
0.5 CONCENTRATE ORAL EVERY 8 HOURS
Status: ON HOLD | COMMUNITY
End: 2021-07-21 | Stop reason: SDUPTHER

## 2021-07-18 RX ORDER — IPRATROPIUM BROMIDE AND ALBUTEROL SULFATE 2.5; .5 MG/3ML; MG/3ML
1 SOLUTION RESPIRATORY (INHALATION) EVERY 4 HOURS PRN
Status: DISCONTINUED | OUTPATIENT
Start: 2021-07-18 | End: 2021-07-21 | Stop reason: HOSPADM

## 2021-07-18 RX ORDER — ONDANSETRON 2 MG/ML
4 INJECTION INTRAMUSCULAR; INTRAVENOUS EVERY 6 HOURS PRN
Status: DISCONTINUED | OUTPATIENT
Start: 2021-07-18 | End: 2021-07-21 | Stop reason: HOSPADM

## 2021-07-18 RX ORDER — SODIUM CHLORIDE 9 MG/ML
25 INJECTION, SOLUTION INTRAVENOUS PRN
Status: DISCONTINUED | OUTPATIENT
Start: 2021-07-18 | End: 2021-07-21 | Stop reason: HOSPADM

## 2021-07-18 RX ORDER — MIDODRINE HYDROCHLORIDE 5 MG/1
5 TABLET ORAL
Status: DISCONTINUED | OUTPATIENT
Start: 2021-07-18 | End: 2021-07-21 | Stop reason: HOSPADM

## 2021-07-18 RX ORDER — SODIUM CHLORIDE 0.9 % (FLUSH) 0.9 %
5-40 SYRINGE (ML) INJECTION EVERY 12 HOURS SCHEDULED
Status: DISCONTINUED | OUTPATIENT
Start: 2021-07-18 | End: 2021-07-21 | Stop reason: HOSPADM

## 2021-07-18 RX ORDER — SENNOSIDES 8.8 MG/5ML
5 LIQUID ORAL NIGHTLY
Status: DISCONTINUED | OUTPATIENT
Start: 2021-07-18 | End: 2021-07-21 | Stop reason: HOSPADM

## 2021-07-18 RX ORDER — ONDANSETRON 4 MG/1
4 TABLET, ORALLY DISINTEGRATING ORAL EVERY 8 HOURS PRN
Status: DISCONTINUED | OUTPATIENT
Start: 2021-07-18 | End: 2021-07-21 | Stop reason: HOSPADM

## 2021-07-18 RX ORDER — TRAZODONE HYDROCHLORIDE 100 MG/1
100 TABLET ORAL NIGHTLY
COMMUNITY

## 2021-07-18 RX ORDER — ACETAMINOPHEN 650 MG/1
650 SUPPOSITORY RECTAL ONCE
Status: COMPLETED | OUTPATIENT
Start: 2021-07-18 | End: 2021-07-18

## 2021-07-18 RX ORDER — VALPROIC ACID 250 MG/5ML
1000 SOLUTION ORAL 3 TIMES DAILY
Status: DISCONTINUED | OUTPATIENT
Start: 2021-07-18 | End: 2021-07-21 | Stop reason: HOSPADM

## 2021-07-18 RX ORDER — ACETAMINOPHEN 650 MG/1
650 SUPPOSITORY RECTAL EVERY 6 HOURS PRN
Status: DISCONTINUED | OUTPATIENT
Start: 2021-07-18 | End: 2021-07-21 | Stop reason: HOSPADM

## 2021-07-18 RX ORDER — DIVALPROEX SODIUM 125 MG/1
500 CAPSULE, COATED PELLETS ORAL 3 TIMES DAILY
Status: DISCONTINUED | OUTPATIENT
Start: 2021-07-18 | End: 2021-07-18

## 2021-07-18 RX ORDER — MENTHOL 40 MG/ML
GEL TOPICAL
Status: ON HOLD | COMMUNITY
End: 2021-07-21 | Stop reason: HOSPADM

## 2021-07-18 RX ORDER — ACETAMINOPHEN 325 MG/1
650 TABLET ORAL EVERY 6 HOURS PRN
Status: DISCONTINUED | OUTPATIENT
Start: 2021-07-18 | End: 2021-07-21 | Stop reason: HOSPADM

## 2021-07-18 RX ORDER — SCOLOPAMINE TRANSDERMAL SYSTEM 1 MG/1
1 PATCH, EXTENDED RELEASE TRANSDERMAL
COMMUNITY

## 2021-07-18 RX ORDER — DONEPEZIL HYDROCHLORIDE 10 MG/1
10 TABLET, FILM COATED ORAL NIGHTLY
COMMUNITY

## 2021-07-18 RX ORDER — M-VIT,TX,IRON,MINS/CALC/FOLIC 27MG-0.4MG
1 TABLET ORAL 2 TIMES DAILY
Status: ON HOLD | COMMUNITY
End: 2021-07-21 | Stop reason: HOSPADM

## 2021-07-18 RX ORDER — MELOXICAM 7.5 MG/1
7.5 TABLET ORAL DAILY
Status: DISCONTINUED | OUTPATIENT
Start: 2021-07-18 | End: 2021-07-18

## 2021-07-18 RX ORDER — CLONAZEPAM 0.5 MG/1
0.5 TABLET ORAL 2 TIMES DAILY
Status: DISCONTINUED | OUTPATIENT
Start: 2021-07-18 | End: 2021-07-18

## 2021-07-18 RX ORDER — FUROSEMIDE 20 MG/1
20 TABLET ORAL DAILY
Status: DISCONTINUED | OUTPATIENT
Start: 2021-07-18 | End: 2021-07-21 | Stop reason: HOSPADM

## 2021-07-18 RX ORDER — POLYETHYLENE GLYCOL 3350 17 G/17G
17 POWDER, FOR SOLUTION ORAL DAILY PRN
Status: DISCONTINUED | OUTPATIENT
Start: 2021-07-18 | End: 2021-07-21 | Stop reason: HOSPADM

## 2021-07-18 RX ORDER — FAMOTIDINE 40 MG/5ML
20 POWDER, FOR SUSPENSION ORAL DAILY
COMMUNITY

## 2021-07-18 RX ORDER — SENNA PLUS 8.6 MG/1
1 TABLET ORAL DAILY
COMMUNITY

## 2021-07-18 RX ORDER — ASPIRIN 81 MG/1
81 TABLET, CHEWABLE ORAL DAILY
COMMUNITY

## 2021-07-18 RX ADMIN — VANCOMYCIN HYDROCHLORIDE 1500 MG: 5 INJECTION, POWDER, LYOPHILIZED, FOR SOLUTION INTRAVENOUS at 14:48

## 2021-07-18 RX ADMIN — ENOXAPARIN SODIUM 40 MG: 40 INJECTION, SOLUTION INTRAVENOUS; SUBCUTANEOUS at 22:13

## 2021-07-18 RX ADMIN — VALPROIC ACID 1000 MG: 250 SOLUTION ORAL at 16:22

## 2021-07-18 RX ADMIN — ASPIRIN 81 MG: 81 TABLET, CHEWABLE ORAL at 16:22

## 2021-07-18 RX ADMIN — SODIUM CHLORIDE 1572 ML: 9 INJECTION, SOLUTION INTRAVENOUS at 05:04

## 2021-07-18 RX ADMIN — IOPAMIDOL 80 ML: 755 INJECTION, SOLUTION INTRAVENOUS at 10:25

## 2021-07-18 RX ADMIN — MIDODRINE HYDROCHLORIDE 5 MG: 5 TABLET ORAL at 16:18

## 2021-07-18 RX ADMIN — ACETAMINOPHEN 650 MG: 325 TABLET ORAL at 16:21

## 2021-07-18 RX ADMIN — CEFEPIME HYDROCHLORIDE 2000 MG: 2 INJECTION, POWDER, FOR SOLUTION INTRAVENOUS at 05:04

## 2021-07-18 RX ADMIN — DONEPEZIL HYDROCHLORIDE 10 MG: 10 TABLET, FILM COATED ORAL at 16:23

## 2021-07-18 RX ADMIN — VALPROIC ACID 1000 MG: 250 SOLUTION ORAL at 22:13

## 2021-07-18 RX ADMIN — FAMOTIDINE 20 MG: 20 TABLET, FILM COATED ORAL at 16:22

## 2021-07-18 RX ADMIN — SODIUM CHLORIDE, PRESERVATIVE FREE 10 ML: 5 INJECTION INTRAVENOUS at 22:12

## 2021-07-18 RX ADMIN — ACETAMINOPHEN 650 MG: 650 SUPPOSITORY RECTAL at 05:04

## 2021-07-18 RX ADMIN — CEFEPIME HYDROCHLORIDE 2000 MG: 2 INJECTION, POWDER, FOR SOLUTION INTRAVENOUS at 18:17

## 2021-07-18 RX ADMIN — ACETAMINOPHEN 650 MG: 325 TABLET ORAL at 22:19

## 2021-07-18 ASSESSMENT — ENCOUNTER SYMPTOMS: SHORTNESS OF BREATH: 1

## 2021-07-18 ASSESSMENT — PAIN SCALES - GENERAL
PAINLEVEL_OUTOF10: 0

## 2021-07-18 NOTE — H&P
History & Physical        Patient:  Eric Chi  YOB: 1950    MRN: [id number]     Acct: [de-identified]    PCP: Alfreda Shipley DO    Date of Admission: 7/18/2021    Date of Service: Pt seen/examined on 07/18/21  and Admitted to Inpatient with expected LOS greater than two midnights due to medical therapy. Chief Complaint:  Acute respiratory failure      History Of Present Illness:    70 y.o. female who presented to [facility] with history of seizure, nonverbal and coma status, Alzheimer's disease, aphasia, tube feeding dependent, CAD, CVA, chronic UTI, hypertension, and hyperlipidema is admitted for acute hypoxic respiratory failure secondary to HCAP. She is a poor historian. She was brought to the ED for fever and SOB. She was saturating on 90% on RA. Past Medical History:          Diagnosis Date    Alzheimer disease ([address]     Anxiety     Aphasia     Bipolar 2 disorder ([address]     CAD (coronary artery disease)     Cerebral artery occlusion with cerebral infarction ([address]     Chronic pain     Chronic UTI     Contracture of joint of shoulder region, left     Dementia ([address]     Depression     Dysphagia     GERD (gastroesophageal reflux disease)     Hyperlipidemia     Hypertension     Psychiatric problem     Seizures ([address]        Past Surgical History:          Procedure Laterality Date    GASTROSTOMY TUBE PLACEMENT N/A 2/17/2021    EGD PEG TUBE PLACEMENT performed by Vandana Stearns MD at [facility]         Medications Prior to Admission:      Prior to Admission medications    Medication Sig Start Date End Date Taking? Authorizing Provider   valproic acid (DEPACON) 250 MG/5ML SOLN oral solution Take 750 mg by mouth 3 times daily    Historical Provider, MD   Magnesium Hydroxide (MILK OF MAGNESIA PO) Take by mouth    Historical Provider, MD   clonazePAM (KLONOPIN) 0.5 MG tablet Take 0.5 mg by mouth 2 times daily. Historical Provider, MD   famotidine (PEPCID) 20 MG tablet Take 20 mg by mouth 2 times daily    Historical Provider, MD   meloxicam (MOBIC) 7.5 MG tablet Take 7.5 mg by mouth daily    Historical Provider, MD   senna (SENOKOT) 8.8 MG/5ML SYRP syrup Take 5 mLs by mouth nightly    Historical Provider, MD   Donepezil HCl (ARICEPT) 23 MG TABS tablet Take 10 mg by mouth daily     Historical Provider, MD   furosemide (LASIX) 20 MG tablet Take 20 mg by mouth daily    Historical Provider, MD   metoprolol (LOPRESSOR) 25 MG tablet Take 12.5 mg by mouth daily Hold if SBP < 110 or HR < 60    Historical Provider, MD   traZODone (DESYREL) 50 MG tablet Take 100 mg by mouth nightly     Historical Provider, MD   divalproex (DEPAKOTE SPRINKLE) 125 MG capsule Take 500 mg by mouth three times daily     Historical Provider, MD   acetaminophen (TYLENOL) 325 MG tablet Take 650 mg by mouth 3 times daily    Historical Provider, MD   aspirin 81 MG chewable tablet Take 1 tablet by mouth daily 9/9/15   Julianna Gonsalves MD       Allergies:  Pcn [penicillins]    Social History:      The patient currently lives SNF    TOBACCO:   reports that she has quit smoking. Her smoking use included cigarettes. She has never used smokeless tobacco.  ETOH:   reports no history of alcohol use. Family History:    Reviewed in detail and negative for DM, CAD, Cancer, CVA. Positive as follows:        Problem Relation Age of Onset    Other Mother        Diet:  No diet orders on file    REVIEW OF SYSTEMS:   Pertinent positives as noted in the HPI. All other systems reviewed and negative. PHYSICAL EXAM:    /71   Pulse 102   Temp 103.5 °F (39.7 °C) (Oral)   Resp 29   Ht 5' 3\" (1.6 m)   Wt 140 lb (63.5 kg)   SpO2 96%   BMI 24.80 kg/m²     General appearance:  Ill appearing  HEENT:  Normal cephalic, atraumatic without obvious deformity. Pupils equal, round, and reactive to light. Extra ocular muscles intact. Conjunctivae/corneas clear.   Neck: Supple, with full range of motion. No jugular venous distention. Trachea midline. Respiratory:  Normal respiratory effort. Clear to auscultation, bilaterally without Rales/Wheezes/Rhonchi. Cardiovascular:  Regular rate and rhythm with normal S1/S2 without murmurs, rubs or gallops. Abdomen: Soft, non-tender, non-distended with normal bowel sounds. Musculoskeletal:  No clubbing, cyanosis or edema bilaterally. Full range of motion without deformity. Skin: Skin color, texture, turgor normal.  No rashes or lesions. Neurologic:  Neurovascularly intact without any focal sensory/motor deficits. Cranial nerves: II-XII intact, grossly non-focal.  Psychiatric:  Alert and oriented, thought content appropriate, normal insight  Capillary Refill: Brisk,< 3 seconds   Peripheral Pulses: +2 palpable, equal bilaterally       Labs:     Recent Labs     07/18/21  0405   WBC 14.8*   HGB 12.4   HCT 39.9   *     Recent Labs     07/18/21  0405      K 4.0      CO2 24   BUN 31*   CREATININE 0.9   CALCIUM 9.5     Recent Labs     07/18/21  0405   AST 17   ALT 7*   BILITOT 0.4   ALKPHOS 62     Recent Labs     07/18/21  0405   INR 1.21*     No results for input(s): Tony Vass in the last 72 hours. Urinalysis:      Lab Results   Component Value Date    NITRU NEGATIVE 07/18/2021    WBCUA 0-2 07/18/2021    BACTERIA NONE SEEN 07/18/2021    RBCUA 0-2 07/18/2021    BLOODU NEGATIVE 07/18/2021    SPECGRAV 1.025 12/24/2014    GLUCOSEU NEGATIVE 07/18/2021       Intake & Output:  No intake/output data recorded. No intake/output data recorded. Radiology:     CXR: I have reviewed the CXR with the following interpretation: New bilateral lower lobe airspace disease particularly on the left likely pneumonic. EKG:  I have reviewed the EKG with the following interpretation: Tachycardia. No ST changes. CT HEAD WO CONTRAST   Final Result   Impression:   No acute hemorrhage or definite acute infarct.       This document has been electronically signed by: Marty Dia MD on    07/18/2021 05:29 AM      All CTs at this facility use dose modulation techniques and iterative    reconstructions, and/or weight-based dosing   when appropriate to reduce radiation to a low as reasonably achievable. XR CHEST PORTABLE   Final Result   Impression:   1. New bilateral lower lobe airspace disease particularly on the left    likely pneumonic. This document has been electronically signed by: Lucas Mccord MD on    07/18/2021 04:42 AM           DVT prophylaxis: {dvt prophylaxis:43138    Code Status: Prior      Disposition:SNF    There are no active hospital problems to display for this patient. PLAN:  70 y.o. female who presented to [facility] with history of seizure, nonverbal and coma status, Alzheimer's disease, aphasia, tube feeding dependent, CAD, CVA, chronic UTI, hypertension, and hyperlipidema is admitted for acute hypoxic respiratory failure secondary to HCAP. 1) Acute hypoxic respiratory failure secondary to HCAP: Chest xray disclosed new bilateral lower lobe airspace disease particularly on the left likely pneumonic. Will start cefepime, levaquin and vancomycin. Continue with oxygen. Duonebs PRN  Cultures pending. 2) Chronic issues: seizure, nonverbal and coma status, Alzheimer's disease, aphasia  Resume home meds. Thank you Alysa Greene DO for the opportunity to be involved in this patient's care.     Electronically signed by Tien Mao MD on 7/18/2021 at 5:44 AM

## 2021-07-18 NOTE — ED NOTES
RN called pt family Pandajared Jon to update him on pt and room assignment.       Burnell Angelucci, RN  07/18/21 1697

## 2021-07-18 NOTE — ED TRIAGE NOTES
Pt presents to the ED via EMS from [facility] with c/o shortness of breath and fever. EMS report pt was 90% on room air. Pt has audible rhonchi. Pt placed on 10L non-rebreather. Pt has a rectal temperature of 103.5. Pt is a DNR-CCA.

## 2021-07-18 NOTE — PROGRESS NOTES
Pharmacy Medication History Note      List of current medications patient is taking is complete. Source of information: MAR from [facility] made to medication list:  Medications removed (include reason, ex. therapy complete or physician discontinued): · Removed furosemide 20 mg - now on solution  · Removed famotidine 20 mg - now on solution  · Removed senna 8.8 mg/5 mL syrup - now on tablet  · Removed clonazepam - no longer on medication list  · Removed divalproex - no longer on medication list  · Removed mirtazapine - no longer on medication list    Medications added/doses adjusted:  · Adjusted acetaminophen to 325 mg - Take 2 tablets per G tube Q4H PRN for pain or fever  · Adjusted donepezil to 10 mg - Take 1 tablet per G tube nightly  · Adjusted trazodone to 100 mg - Take 1 tablet per G tube nightly  · Adjusted valproic acid liquid to 250 mg/5 mL - Take 1000 mg per G tube Q8H    · Added furosemide 10 mg/mL solution - Take 20 mg (2 mL) per G tube daily  · Added famotidine 40 mg/5 mL solution - Take 20 mg (2.5 mL) per G tube daily  · Added scopolamine 1 mg/3 day patch - Place 1 patch onto the skin Q72H  · Added senna 8.6 mg - Take 1 tablet per G tube daily  · Added multivitamin - Take 1 tablet per G tube BID  · Added lorazepam 2 mg/mL concentrated solution - Take 0.5 mg per G tube Q8H  · Added acetaminophen 500 mg - Take 2 tablets per G tube TID  · Added ipratropium-albuterol 0.5-2.5 (3) mg/3 mL - Inhale contents of 1 vial into the lungs Q4H PRN for shortness of breath  · Added Biofreeze gel 4% - Apply to bilateral knees topically TID for pain    Other notes (ex. Recent course of antibiotics, Coumadin dosing):  · Denies use of other OTC or herbal medications.       Allergies reviewed      Electronically signed by Kavon Nuno, [address] [place] [address] on 7/18/2021 at 2:17 PM

## 2021-07-18 NOTE — PROGRESS NOTES
Pharmacy Note  Vancomycin Consult    Mando Henderson is a 70 y.o. female started on Vancomycin for CAP; consult received from Dr. Cruzito Walker to manage therapy. Also receiving the following antibiotics: Cefepime. Patient Active Problem List   Diagnosis    Elevated troponin level    Abnormal ECG    Essential hypertension    Late onset Alzheimer's disease with behavioral disturbance (HCC)    Seizure disorder ([address]    Schizophrenia ([address]    Coagulation defect ([facility]    HCAP (healthcare-associated pneumonia)       Allergies:  Pcn [penicillins]     Temp max: 103.5    Recent Labs     07/18/21  0405   BUN 31*       Recent Labs     07/18/21  0405   CREATININE 0.9       Recent Labs     07/18/21  0405   WBC 14.8*       No intake or output data in the 24 hours ending 07/18/21 0911    Culture Date Source Results   7/18/21 Blood x 2 Sent   7/18/21 COVID-19, Rapid Not Detected   7/18/21 Rapid influenza A/B antigens Negative   7/18/21 Pneumonia Panel Sent       Ht Readings from Last 1 Encounters:   07/18/21 5' 3\" (1.6 m)        Wt Readings from Last 1 Encounters:   07/18/21 140 lb (63.5 kg)         Body mass index is 24.8 kg/m². CrCl: 51 mL/min      Assessment/Plan:  Will initiate vancomycin 1500 mg x 1 dose, then 1250 mg IV every 24 hours. Timing of trough level will be determined based on culture results, renal function, and clinical response. Thank you for the consult. Will continue to follow.     Jacob Bills PharmD   7/18/2021, 9:38 AM

## 2021-07-18 NOTE — ED PROVIDER NOTES
is well-developed. She is not diaphoretic. HENT:      Head: Normocephalic and atraumatic. Nose: Nose normal.   Eyes:      General: No scleral icterus. Right eye: No discharge. Left eye: No discharge. Conjunctiva/sclera: Conjunctivae normal.      Pupils: Pupils are equal, round, and reactive to light. Neck:      Vascular: No JVD. Trachea: No tracheal deviation. Cardiovascular:      Rate and Rhythm: Regular rhythm. Tachycardia present. Heart sounds: Normal heart sounds. No murmur heard. No friction rub. No gallop. Pulmonary:      Effort: Pulmonary effort is normal. No respiratory distress. Breath sounds: No stridor. Examination of the left-upper field reveals rales. Examination of the left-middle field reveals rales. Examination of the left-lower field reveals rhonchi and rales. Wheezing, rhonchi and rales present. Chest:      Chest wall: No tenderness. Abdominal:      General: Bowel sounds are normal. There is no distension. Palpations: Abdomen is soft. There is no mass. Tenderness: There is no abdominal tenderness. There is no guarding or rebound. Hernia: No hernia is present. Comments: PEG tube in place. Musculoskeletal:         General: No tenderness or deformity. Cervical back: Normal range of motion and neck supple. Lymphadenopathy:      Cervical: No cervical adenopathy. Skin:     General: Skin is warm and dry. Capillary Refill: Capillary refill takes less than 2 seconds. Coloration: Skin is not pale. Findings: No erythema or rash. Neurological:      Cranial Nerves: No cranial nerve deficit. Motor: No abnormal muscle tone. ANCILLARY TEST RESULTS   EKG:    Interpreted by me  No acute changes  Ventricular Rate 106 BPM   Atrial Rate 106 BPM   P-R Interval 132 ms   QRS Duration 82 ms   Q-T Interval 282 ms   QTc Calculation (Bazett) 374 ms   P Axis 58 degrees   R Axis 7 degrees   T Axis 77 degrees LAB RESULTS:  Results for orders placed or performed during the hospital encounter of 07/18/21   COVID-19, Rapid    Specimen: Nasopharyngeal Swab   Result Value Ref Range    SARS-CoV-2, NAAT NOT DETECTED NOT DETECTED   Rapid influenza A/B antigens    Specimen: Nasopharyngeal   Result Value Ref Range    Flu A Antigen Negative NEGATIVE    Flu B Antigen Negative NEGATIVE   CBC Auto Differential   Result Value Ref Range    WBC 14.8 (H) 4.8 - 10.8 thou/mm3    RBC 4.13 (L) 4.20 - 5.40 mill/mm3    Hemoglobin 12.4 12.0 - 16.0 gm/dl    Hematocrit 39.9 37.0 - 47.0 %    MCV 96.6 81.0 - 99.0 fL    MCH 30.0 26.0 - 33.0 pg    MCHC 31.1 (L) 32.2 - 35.5 gm/dl    RDW-CV 15.9 (H) 11.5 - 14.5 %    RDW-SD 56.6 (H) 35.0 - 45.0 fL    Platelets 261 (L) 586 - 400 thou/mm3    MPV 11.6 9.4 - 12.4 fL    Seg Neutrophils 75.8 %    Lymphocytes 13.4 %    Monocytes 9.9 %    Eosinophils 0.0 %    Basophils 0.2 %    Immature Granulocytes 0.7 %    Segs Absolute 11.2 (H) 1 - 7 thou/mm3    Lymphocytes Absolute 2.0 1.0 - 4.8 thou/mm3    Monocytes Absolute 1.5 (H) 0.4 - 1.3 thou/mm3    Eosinophils Absolute 0.0 0.0 - 0.4 thou/mm3    Basophils Absolute 0.0 0.0 - 0.1 thou/mm3    Immature Grans (Abs) 0.10 (H) 0.00 - 0.07 thou/mm3    nRBC 0 /100 wbc   Comprehensive Metabolic Panel   Result Value Ref Range    Glucose 173 (H) 70 - 108 mg/dL    CREATININE 0.9 0.4 - 1.2 mg/dL    BUN 31 (H) 7 - 22 mg/dL    Sodium 142 135 - 145 meq/L    Potassium 4.0 3.5 - 5.2 meq/L    Chloride 103 98 - 111 meq/L    CO2 24 23 - 33 meq/L    Calcium 9.5 8.5 - 10.5 mg/dL    AST 17 5 - 40 U/L    Alkaline Phosphatase 62 38 - 126 U/L    Total Protein 7.5 6.1 - 8.0 g/dL    Albumin 3.4 (L) 3.5 - 5.1 g/dL    Total Bilirubin 0.4 0.3 - 1.2 mg/dL    ALT 7 (L) 11 - 66 U/L   Lactic Acid, Plasma   Result Value Ref Range    Lactic Acid 1.6 0.5 - 2.0 mmol/L   Procalcitonin   Result Value Ref Range    Procalcitonin 0.43 (H) 0.01 - 0.09 ng/mL   Protime-INR   Result Value Ref Range    INR 1.21 (H) 0.85 - 1.13   APTT   Result Value Ref Range    aPTT 35.0 22.0 - 38.0 seconds   Troponin   Result Value Ref Range    Troponin T 0.014 (A) ng/ml   Brain Natriuretic Peptide   Result Value Ref Range    Pro-BNP 2355.0 (H) 0.0 - 900.0 pg/mL   Blood gas, arterial   Result Value Ref Range    pH, Blood Gas 7.45 7.35 - 7.45    PCO2 39 35 - 45 mmhg    PO2 91 71 - 104 mmhg    HCO3 27 23 - 28 mmol/l    Base Excess (Calculated) 2.9 (H) -2.5 - 2.5 mmol/l    O2 Sat 97 %    IFIO2 10     DEVICE NRB     Mina Test Positive     Source: R Radial     COLLECTED BY: [id number]    Urine with Reflexed Micro   Result Value Ref Range    Glucose, Ur NEGATIVE NEGATIVE mg/dl    Bilirubin Urine NEGATIVE NEGATIVE    Ketones, Urine TRACE (A) NEGATIVE    Specific Gravity, Urine 1.026 1.002 - 1.030    Blood, Urine NEGATIVE NEGATIVE    pH, UA 5.0 5.0 - 9.0    Protein,  (A) NEGATIVE    Urobilinogen, Urine 1.0 0.0 - 1.0 eu/dl    Nitrite, Urine NEGATIVE NEGATIVE    Leukocyte Esterase, Urine NEGATIVE NEGATIVE    Color, UA DK YELLOW (A) STRAW-YELLOW    Character, Urine CLOUDY (A) CLEAR-SL CLOUD    RBC, UA 0-2 0-2/hpf /hpf    WBC, UA 0-2 0-4/hpf /hpf    Epithelial Cells, UA 3-5 3-5/hpf /hpf    Bacteria, UA NONE SEEN FEW/NONE SEEN /hpf    Casts UA 8-15 HYALINE NONE SEEN /lpf    Crystals, UA NONE SEEN NONE SEEN    Renal Epithelial, UA NONE SEEN NONE SEEN    Yeast, UA NONE SEEN NONE SEEN    CASTS 2 NONE SEEN NONE SEEN /lpf    MISCELLANEOUS 2 NONE SEEN    Anion Gap   Result Value Ref Range    Anion Gap 15.0 8.0 - 16.0 meq/L   Osmolality   Result Value Ref Range    Osmolality Calc 293.8 275.0 - 300.0 mOsmol/kg   Glomerular Filtration Rate, Estimated   Result Value Ref Range    Est, Glom Filt Rate 75 (A) ml/min/1.73m2   EKG Altered Mental Status   Result Value Ref Range    Ventricular Rate 106 BPM    Atrial Rate 106 BPM    P-R Interval 132 ms    QRS Duration 82 ms    Q-T Interval 282 ms    QTc Calculation (Bazett) 374 ms    P Axis 58 degrees    R Axis 7 degrees    T [place] 77 degrees       RADIOLOGY REPORTS  XR CHEST PORTABLE   Final Result   Impression:   1. New bilateral lower lobe airspace disease particularly on the left    likely pneumonic. This document has been electronically signed by: Emilie Washington MD on    07/18/2021 04:42 AM      CT HEAD WO CONTRAST    (Results Pending)       MEDICAL DEDISION MAKINGS AND RATIONALES     Differential diagnsis: Aspiration pneumonia, respiratory failure, Covid-19 pneumonia, dehydration, sepsis, UTI, CHF    Actions: Large-bore IV, normal saline bolus at 30 mL/kg, labs, CT head, x-ray    ED Vitals:  Vitals:    07/18/21 0347 07/18/21 0458   BP:  118/81   Pulse: 106 103   Resp: 24 22   Temp: 103.5 °F (39.7 °C)    TempSrc: Oral    SpO2: 98% 99%   Weight:  140 lb (63.5 kg)   Height:  5' 3\" (1.6 m)       ED work-ups revealed patient has sepsis, likely secondary to aspiration pneumonia. Medications   cefepime (MAXIPIME) 2000 mg IVPB minibag (has no administration in time range)   acetaminophen (TYLENOL) suppository 650 mg (has no administration in time range)   0.9 % sodium chloride bolus (has no administration in time range)     Discussed and admitted to hospitalist service. Janice Mccord    PROCEDURES   None    FINAL IMPRESSION AND DISPOSITION      1. Sepsis due to pneumonia ([address]    2. Aspiration pneumonia of both lungs, unspecified aspiration pneumonia type, unspecified part of lung ([address]        Admit    PATIENT REFERRED TO:  No follow-up provider specified.     DISCHARGE MEDICATIONS:  New Prescriptions    No medications on file       (Please note that portions of this note were completed with a voice recognition program.  Efforts were made to edit the dictations but occasionally words aremis-transcribed.)    Minus MD Simon         Minus MD Simon  07/18/21 0050

## 2021-07-18 NOTE — PROGRESS NOTES
[phone] This RN spoke with patient's legal guardian Jose Milton about patient's code status. Legal guardian wishes to maintain patient as DNR-CCA as filed.     1- Dr. Melyssa Carreon notified

## 2021-07-18 NOTE — SIGNIFICANT EVENT
Hospitalist progress note    S: Please see H&P for details, patient was admitted past midnight. Received a perfect serve message from [facility] that patient's blood pressure is 53/39, patient just arrived from ER. Patient seen and examined in. After repositioning, patient's blood pressure improved to 103/74. Patient looks drowsy, responds to pain stimuli. Per H&P note, patient has history of aphasia/nonverbal and CVA. Patient did not answer my questions although follows some commands. Patient opened her eyes after sternal rub and started to follow some commands and started coughing    O:  /74   Pulse 86   Temp 100 °F (37.8 °C) (Rectal)   Resp 29   Ht 5' 3\" (1.6 m)   Wt 140 lb (63.5 kg)   SpO2 96%   BMI 24.80 kg/m²    General appearance: alert, not in acute distress  HEENT: PERRLA, clear oral mucosa  Chest: On 15 L/min O2 via nonrebreather mask, positive crackles on lung bases, no wheezing, no rhonchi. CVS exam: normal rate, regular rhythm, normal S1, S2, no murmurs, rubs, clicks or gallops. Abdominal exam: non-distended, NABS, soft, non-tender, no guarding, no masses or organomegaly. Neurological exam: drowsy, cannot assess orientation ( patient did not answer questions), when asked to squeeze my fingers, pt is trying to squeeze my fingers  Exam of extremities: peripheral pulses normal, no pedal edema, no clubbing or cyanosis    A/P: Please see H&P for details, patient was admitted past midnight    Acute hypoxic respiratory failure secondary to HCAP, worsening    -from 10 lpm to 15 lpm O2   -ABG this morning showed mild respiratory alkalosis, repeat ABG unchanged.   Continue oxygen supplementation, wean O2 as tolerated  -COVID(-)  -chest CTA ordered, r/o PE    Possible acute on chronic HFpEF    -proBNP elevated at 2355 on admission  -EF 55 to 79%, grade 1 diastolic heart failure per echocardiogram in 2015  -Patient has crackles on lower lung bases bilaterally, no peripheral edema on examination.  -Patient received IV fluid bolus per sepsis protocol in the ED, IV fluid maintenance ordered during admission, will hold for now due to possible CHF exacerbation  Daily weights, I/Os, fluid and salt restrictions      Elevated troponin, possibly secondary to sepsis/pneumonia, rule out ACS    -We will trend troponin  -EKG showed sinus tachycardia, Cannot rule out Anterior infarct   -Cannot assess if patient has ACS symptoms as patient is nonverbal    Hypotension, likely related to sepsis, improving    -start midodrine 5 mg via PEG tube TID with holding parameters   -Holding IV fluids for now due to possible CHF exacerbation  -Continue to monitor vital signs per floor protocol    Cont the rest of management as ordered during admission       Electronically signed by Cirilo Ellison MD on 7/18/2021 at 9:28 AM

## 2021-07-18 NOTE — ED NOTES
ED to inpatient nurses report    Chief Complaint   Patient presents with    Fever    Shortness of Breath      Present to ED from [facility]  LOC: Responds to pain  Vital signs   Vitals:    07/18/21 0347 07/18/21 0458 07/18/21 0538 07/18/21 0615   BP:  118/81 122/71 122/62   Pulse: 106 103 102 95   Resp: 24 22 29 29   Temp: 103.5 °F (39.7 °C)      TempSrc: Oral      SpO2: 98% 99% 96% 99%   Weight:  140 lb (63.5 kg)     Height:  5' 3\" (1.6 m)        Oxygen Baseline 0L    Current needs required 10L non-breather Bipap/Cpap No  LDAs:   Peripheral IV 07/18/21 Left Forearm (Active)   Site Assessment Clean;Dry; Intact 07/18/21 0616   Line Status Capped 07/18/21 0616   Dressing Status Clean;Dry; Intact 07/18/21 0616   Dressing Intervention New 07/18/21 0358       Peripheral IV 07/18/21 Right Forearm (Active)   Site Assessment Clean;Dry; Intact 07/18/21 0616   Line Status Capped 07/18/21 0616   Dressing Status Clean;Dry; Intact 07/18/21 0616   Dressing Intervention New 07/18/21 0359     Mobility: Fully dependent  Pending ED orders: none  Present condition: continuous monitoring of vitals.      Electronically signed by Felecia Dozier RN on 7/18/2021 at [facility] [id number], RN  07/18/21 0419

## 2021-07-18 NOTE — PROGRESS NOTES
This RN called and spoke to Yuly Members, patient's legal guardian, to verify code status. Yuly Members did not want to discuss at this time stating \"I'm going to [place], they already called me. I'll be up in an hour\".  Dr. Bam Cristina updated

## 2021-07-18 NOTE — ED NOTES
RN called and spoke with thePlatform pt POC. Ledbetter Chromluis states he does not want compressions or intubated but is agreeable to central line.       Christoph Best RN  07/18/21 5070

## 2021-07-18 NOTE — PLAN OF CARE
Updates for Alexander Coxgilberto    History Of Present Illness:    70 y.o. female who presented to [address] [place] [address] with history of seizure, nonverbal and coma status, Alzheimer's disease, aphasia, tube feeding dependent, CAD, CVA, chronic UTI, hypertension, and hyperlipidema is admitted for acute hypoxic respiratory failure secondary to HCAP. She is a poor historian. She was brought to the ED for fever and SOB. She was saturating on 90% on RA. Subjective  Of note, the patient's blood pressures have been fluctuating throughout the day ranging from 54/39 to 82/62 to 80/43 MAP 56. I talked to the nurse for this patient who got the code status from the patient's legal guardian as DNR-CCA. The patient is responsive to stimuli but is otherwise nonverbal and aphasic. The patient is able to follow some commands at this time and can open eyes upon touch. The patient was originally breathing 10 L on a nonbreather mask and then 6 L NC when I saw her but was put on 15 L/min. O2 via nonrebreather ask after her blood pressure dropped to 53/39. Objective (per significant event)  /74   Pulse 86   Temp 100 °F (37.8 °C) (Rectal)   Resp 29   Ht 5' 3\" (1.6 m)   Wt 140 lb (63.5 kg)   SpO2 96%   BMI 24.80 kg/m²    General appearance: alert, not in acute distress  HEENT: PERRLA, clear oral mucosa  Chest: On 15 L/min O2 via nonrebreather mask, positive crackles on lung bases, no wheezing, no rhonchi. CVS exam: normal rate, regular rhythm, normal S1, S2, no murmurs, rubs, clicks or gallops. Abdominal exam: non-distended, NABS, soft, non-tender, no guarding, no masses or organomegaly. Neurological: patient is drowsy, nonverbal, aphasic, and responsive to painful stimuli  Musculoskeletal: peripheral pulses normal, no pedal edema, no clubbing or cyanosis    Assessment/Plan (per significant event)  1.  Acute hypoxic respiratory failure secondary to HCAP-worsening: patient now on 15 L nonrebreather mask O2; ABG showed

## 2021-07-18 NOTE — ED NOTES
Dr Jericho Abbasi and RN spoke with RN at [facility] regarding pt. [facility] reports pt has a history of seizures which led to dysphagia and the G tube. [facility] reports after G Tube was placed, pt started to decline in health. [facility] reports pt does not speak, but will open eyes at times, responds to pain, and will move her arm if \"bothering her too much\". [facility] reports that pt has not been tolerating tube feedings and believe she may have aspirated.       Eliott Collet, AVANI  07/18/21 8189

## 2021-07-18 NOTE — ED NOTES
Bed: 024A  Expected date:   Expected time:   Means of arrival: [facility]  Comments:     Gustabo Kim RN  07/18/21 6417

## 2021-07-19 LAB
ANION GAP SERPL CALCULATED.3IONS-SCNC: 11 MEQ/L (ref 8–16)
BUN BLDV-MCNC: 33 MG/DL (ref 7–22)
CALCIUM SERPL-MCNC: 9.1 MG/DL (ref 8.5–10.5)
CHLORIDE BLD-SCNC: 107 MEQ/L (ref 98–111)
CO2: 24 MEQ/L (ref 23–33)
CREAT SERPL-MCNC: 0.7 MG/DL (ref 0.4–1.2)
ERYTHROCYTE [DISTWIDTH] IN BLOOD BY AUTOMATED COUNT: 15.4 % (ref 11.5–14.5)
ERYTHROCYTE [DISTWIDTH] IN BLOOD BY AUTOMATED COUNT: 55.2 FL (ref 35–45)
GFR SERPL CREATININE-BSD FRML MDRD: > 90 ML/MIN/1.73M2
GLUCOSE BLD-MCNC: 86 MG/DL (ref 70–108)
HCT VFR BLD CALC: 32.5 % (ref 37–47)
HEMOGLOBIN: 10.1 GM/DL (ref 12–16)
LV EF: 60 %
LVEF MODALITY: NORMAL
MCH RBC QN AUTO: 30.5 PG (ref 26–33)
MCHC RBC AUTO-ENTMCNC: 31.1 GM/DL (ref 32.2–35.5)
MCV RBC AUTO: 98.2 FL (ref 81–99)
MRSA SCREEN RT-PCR: NEGATIVE
PLATELET # BLD: 110 THOU/MM3 (ref 130–400)
PMV BLD AUTO: 11.2 FL (ref 9.4–12.4)
POTASSIUM REFLEX MAGNESIUM: 3.9 MEQ/L (ref 3.5–5.2)
PRO-BNP: 779.3 PG/ML (ref 0–900)
PROCALCITONIN: 0.49 NG/ML (ref 0.01–0.09)
RBC # BLD: 3.31 MILL/MM3 (ref 4.2–5.4)
SODIUM BLD-SCNC: 142 MEQ/L (ref 135–145)
WBC # BLD: 13.6 THOU/MM3 (ref 4.8–10.8)

## 2021-07-19 PROCEDURE — 6360000002 HC RX W HCPCS: Performed by: HOSPITALIST

## 2021-07-19 PROCEDURE — 36415 COLL VENOUS BLD VENIPUNCTURE: CPT

## 2021-07-19 PROCEDURE — 83880 ASSAY OF NATRIURETIC PEPTIDE: CPT

## 2021-07-19 PROCEDURE — 2500000003 HC RX 250 WO HCPCS: Performed by: FAMILY MEDICINE

## 2021-07-19 PROCEDURE — 2580000003 HC RX 258: Performed by: HOSPITALIST

## 2021-07-19 PROCEDURE — 80048 BASIC METABOLIC PNL TOTAL CA: CPT

## 2021-07-19 PROCEDURE — 87899 AGENT NOS ASSAY W/OPTIC: CPT

## 2021-07-19 PROCEDURE — 6360000002 HC RX W HCPCS

## 2021-07-19 PROCEDURE — 2580000003 HC RX 258: Performed by: EMERGENCY MEDICINE

## 2021-07-19 PROCEDURE — 99232 SBSQ HOSP IP/OBS MODERATE 35: CPT | Performed by: FAMILY MEDICINE

## 2021-07-19 PROCEDURE — 6370000000 HC RX 637 (ALT 250 FOR IP): Performed by: HOSPITALIST

## 2021-07-19 PROCEDURE — 2580000003 HC RX 258

## 2021-07-19 PROCEDURE — 84145 PROCALCITONIN (PCT): CPT

## 2021-07-19 PROCEDURE — 85027 COMPLETE CBC AUTOMATED: CPT

## 2021-07-19 PROCEDURE — 87449 NOS EACH ORGANISM AG IA: CPT

## 2021-07-19 PROCEDURE — 93306 TTE W/DOPPLER COMPLETE: CPT

## 2021-07-19 PROCEDURE — 6370000000 HC RX 637 (ALT 250 FOR IP): Performed by: FAMILY MEDICINE

## 2021-07-19 PROCEDURE — 2060000000 HC ICU INTERMEDIATE R&B

## 2021-07-19 PROCEDURE — 6360000002 HC RX W HCPCS: Performed by: EMERGENCY MEDICINE

## 2021-07-19 RX ADMIN — METOPROLOL TARTRATE 12.5 MG: 25 TABLET, FILM COATED ORAL at 09:29

## 2021-07-19 RX ADMIN — ENOXAPARIN SODIUM 40 MG: 40 INJECTION, SOLUTION INTRAVENOUS; SUBCUTANEOUS at 21:15

## 2021-07-19 RX ADMIN — VALPROIC ACID 1000 MG: 250 SOLUTION ORAL at 09:28

## 2021-07-19 RX ADMIN — FAMOTIDINE 20 MG: 20 TABLET, FILM COATED ORAL at 09:29

## 2021-07-19 RX ADMIN — CEFEPIME HYDROCHLORIDE 2000 MG: 2 INJECTION, POWDER, FOR SOLUTION INTRAVENOUS at 18:22

## 2021-07-19 RX ADMIN — ACETAMINOPHEN 650 MG: 325 TABLET ORAL at 21:15

## 2021-07-19 RX ADMIN — MIDODRINE HYDROCHLORIDE 5 MG: 5 TABLET ORAL at 09:29

## 2021-07-19 RX ADMIN — Medication 8.8 MG: at 21:15

## 2021-07-19 RX ADMIN — MIDODRINE HYDROCHLORIDE 5 MG: 5 TABLET ORAL at 13:30

## 2021-07-19 RX ADMIN — ASPIRIN 81 MG: 81 TABLET, CHEWABLE ORAL at 09:29

## 2021-07-19 RX ADMIN — VANCOMYCIN HYDROCHLORIDE 1250 MG: 5 INJECTION, POWDER, LYOPHILIZED, FOR SOLUTION INTRAVENOUS at 09:33

## 2021-07-19 RX ADMIN — METRONIDAZOLE 500 MG: 500 INJECTION, SOLUTION INTRAVENOUS at 22:12

## 2021-07-19 RX ADMIN — CEFEPIME HYDROCHLORIDE 2000 MG: 2 INJECTION, POWDER, FOR SOLUTION INTRAVENOUS at 04:56

## 2021-07-19 RX ADMIN — ACETAMINOPHEN 650 MG: 325 TABLET ORAL at 09:29

## 2021-07-19 RX ADMIN — LORAZEPAM 0.5 MG: 2 SOLUTION, CONCENTRATE ORAL at 21:20

## 2021-07-19 RX ADMIN — MIDODRINE HYDROCHLORIDE 5 MG: 5 TABLET ORAL at 18:23

## 2021-07-19 RX ADMIN — VALPROIC ACID 1000 MG: 250 SOLUTION ORAL at 14:52

## 2021-07-19 RX ADMIN — TRAZODONE HYDROCHLORIDE 100 MG: 100 TABLET ORAL at 21:16

## 2021-07-19 RX ADMIN — DONEPEZIL HYDROCHLORIDE 10 MG: 10 TABLET, FILM COATED ORAL at 09:29

## 2021-07-19 RX ADMIN — SODIUM CHLORIDE, PRESERVATIVE FREE 5 ML: 5 INJECTION INTRAVENOUS at 21:16

## 2021-07-19 RX ADMIN — METRONIDAZOLE 500 MG: 500 INJECTION, SOLUTION INTRAVENOUS at 14:52

## 2021-07-19 RX ADMIN — FUROSEMIDE 20 MG: 20 TABLET ORAL at 09:29

## 2021-07-19 RX ADMIN — SODIUM CHLORIDE, PRESERVATIVE FREE 10 ML: 5 INJECTION INTRAVENOUS at 21:15

## 2021-07-19 RX ADMIN — ACETAMINOPHEN 650 MG: 325 TABLET ORAL at 14:52

## 2021-07-19 RX ADMIN — VALPROIC ACID 1000 MG: 250 SOLUTION ORAL at 21:15

## 2021-07-19 ASSESSMENT — PAIN SCALES - GENERAL
PAINLEVEL_OUTOF10: 0

## 2021-07-19 NOTE — PLAN OF CARE
Problem: Skin Integrity:  Goal: Will show no infection signs and symptoms  Description: Will show no infection signs and symptoms  Outcome: Ongoing  Note: Patient admitted for pneumonia, patient is afebrile but has active medical diagnosis of infection. Problem: Skin Integrity:  Goal: Absence of new skin breakdown  Description: Absence of new skin breakdown  Outcome: Ongoing  Note: No new skin breakdown this shift, patient turned every 2 hours, patient has old healing wounds that were noted on admission. Problem: Falls - Risk of:  Goal: Will remain free from falls  Description: Will remain free from falls  Outcome: Ongoing  Note: Patient free from falls this shift with call light within reach, and bed alarm in place. Problem: Falls - Risk of:  Goal: Absence of physical injury  Description: Absence of physical injury  Outcome: Ongoing   Care plan reviewed with patient. Patient unable to verbalize understanding of the plan of care and contribute to goal setting.

## 2021-07-19 NOTE — PROGRESS NOTES
Palliative Care consult received and chart reviewed. Pt is resting quietly in bed. Attempted to call pt's guardian, Eliana Diehl, but there was no answer. Per chart, pt has a signed [facility] form, signed 6/26/21 on file.

## 2021-07-19 NOTE — PROGRESS NOTES
Hospitalist Progress Note    Patient:  Peña Tucker      Unit/Bed:4K-11/011-A    YOB: 1950    MRN: [id number]       Acct: [de-identified]     PCP: Bala Bridges DO    Date of Admission: 7/18/2021    Assessment/Plan:   1. Acute hypoxic respiratory failure secondary to CAP with concerns for aspiration pneumonia, improving patient now on 1.5/2 L nonrebreather mask O2 with PEG tube placement from Feb 2021 and history of dysphagia; ABG showed mild respiratory alkalosis; repeat ABG unchanged; continue oxygen supplementation; chest CTA ordered to rule out pulmonary embolism ; chest x-ray showed new bilateral lower lobe airspace disease particularly on the left; CTA showed consolidation in the left lower lobe consistent with pneumonia and mild right basilar infiltrate vs atelectasis on the right;patient on cefepime, flagyl, and vancomycin, duonebs prn; check vancomycin troughs; can consider adding zosyn to regiment for aspiration pneumonia but per pharmacy flagyl is best choice  -Patient procalcitonin 0.49; uptrending from 0.42  -WBC 13.6 downtrending from 14.8  -Blood cultures pending  -MRSA swab/screen pending  -Legionella antigen pending  -COVID and flu antigens negative  -Strep pneumoniae antigen pending  -UA negative    2. Sepsis (POA) due to CAP, resolving: febrile, tachypneic, has leukocytosis, hypotensive on arrival.  Lactic acid normal.  Patient received IV fluids bolus per sepsis protocol in the ED, IV fluids maintenance did not order during admission due to CHF exacerbation. Vital signs better today. Leukocytosis downtrending. Blood cultures no growth preliminary. Continue vancomycin and cefepime for CAP, and will add metronidazole for possible aspiration pneumonia.   3. Acute on chronic HPEF, improved: -proBNP elevated at 2355 on admission now downtrending to 775.3  -EF 55 to 89%, grade 1 diastolic heart failure per echocardiogram in 2015  -Echo ordered and pending for 7/19/21; patient on furosemide PO 20 mg daily  -Patient has crackles on lower lung bases bilaterally, no peripheral edema on examination.  -Patient received IV fluid bolus per sepsis protocol in the ED, IV fluid maintenance did not order during admission, will hold for now due to possible CHF exacerbation  -Daily weights, I/Os, fluid and salt restrictions; keep K>4 and Mg>2  4. Elevated troponinX2 ( mildly elevated) possibly secondary to sepsis/pneumonia:  EKG showed sinus tachycardia; cannot confirm symptoms of ACS due to patient's nonverbal status; will continue to monitor; patient on aspirin 81 mg  5. Hypotension secondary to sepsis, resolved: -started midodrine 5 mg via PEG tube TID with holding parameters   -Holding IV fluids for now due to possible CHF exacerbation  -Continue to monitor vital signs per floor protocol  6. History of seizures: continue home medications  7. History of Alzheimer's disease: continue home medications  8. History of hypertension: patient on lopressor 12.5 mg daily; continue to monitor given patient's recent hypotension secondary to sepsis  9. Dysphagia: cont NPO, on PEG tube feeding   10. HLD: not on statin   10. Psychiatric d/o: cont home meds         Expected discharge date:  TBD    Disposition:    [x] Home       [] TCU       [] Rehab       [] Psych       [] SNF       [] [place]       [] Other-    Chief Complaint: fever and shortness of breath    Hospital Course:   70 y.o. female who presented to [address] [place] [address] with history of seizure, nonverbal and coma status, Alzheimer's disease, aphasia, tube feeding dependent, CAD, CVA, chronic UTI, hypertension, and hyperlipidema is admitted for acute hypoxic respiratory failure secondary to HCAP. She is a poor historian. She was brought to the ED for fever and SOB. She was saturating on 90% on RA.  As of 7/19/2021, the patient is SpO2 100 on 2 L nasal cannula and currently being treated for concerns of aspiration pneumonia with vancomycin and flagyl. Subjective (past 24 hours): Per the nurse, the patient's blood pressure has improved to 128/71. The patient was also breathing at 1.5 L 100 SpO2. As of 10:47 AM this morning the patient was breathing 2 L 100 SpO2. The patient wasn't as lethargic as she was yesterday and was more alert and attempting to interact more through grunting. The patient is currently scheduled to have an echocardiogram done today to evaluate for any possible CHF. ROS (12 point review of systems completed. Pertinent positives noted. Otherwise ROS is negative).     Medications:  Reviewed    Infusion Medications    sodium chloride       Scheduled Medications    metroNIDAZOLE  500 mg Intravenous Q8H    cefepime  2,000 mg Intravenous Q12H    aspirin  81 mg Oral Daily    donepezil  10 mg Oral Daily    furosemide  20 mg Oral Daily    metoprolol tartrate  12.5 mg Oral Daily    traZODone  100 mg Oral Nightly    acetaminophen  650 mg Oral TID    senna  5 mL Oral Nightly    sodium chloride flush  5-40 mL Intravenous 2 times per day    enoxaparin  40 mg Subcutaneous Q24H    vancomycin (VANCOCIN) intermittent dosing (placeholder)   Other RX Placeholder    midodrine  5 mg PEG Tube TID     vancomycin  1,250 mg Intravenous Q24H    famotidine  20 mg Oral Daily    valproic acid  1,000 mg Oral TID    LORazepam  0.5 mg Oral Q8H     PRN Meds: sodium chloride flush, sodium chloride, ondansetron **OR** ondansetron, polyethylene glycol, acetaminophen **OR** acetaminophen, ipratropium-albuterol      Intake/Output Summary (Last 24 hours) at 7/19/2021 1334  Last data filed at 7/19/2021 1049  Gross per 24 hour   Intake 822.81 ml   Output 0 ml   Net 822.81 ml       Diet:  Diet NPO  ADULT TUBE FEEDING; PEG; 2.0 Calorie; Cyclic; 38; 5:59 AM; 7:67 AM; 150; Q 4 hours    Exam:  /71   Pulse 59   Temp 99.2 °F (37.3 °C) (Axillary)   Resp 21   Ht 5' 3\" (1.6 m)   Wt 140 lb 14 oz (63.9 kg)   SpO2 100%   BMI 24.95 kg/m²     General appearance: No apparent distress, appears stated age and cooperative. HEENT: Pupils equal, round, and reactive to light. Conjunctivae/corneas clear. Neck: Supple, with full range of motion. No jugular venous distention. Trachea midline. Respiratory:  Patient currently breathing 1.5 L 100% SpO2; mild crackles appreciated on lung bases; no wheezing, (+) rhonchi, normal respiratory effort   Cardiovascular: Regular rate and rhythm with normal S1/S2 without murmurs, rubs or gallops. Abdomen: Soft, non-tender, non-distended with normal bowel sounds. Musculoskeletal: passive and active ROM x 4 extremities. Skin: Skin color, texture, turgor normal.  No rashes or lesions. Neurologic:  Patient is nonverbal, aphasic, but is more alert and interactive today  Capillary Refill: Brisk,< 3 seconds   Peripheral Pulses: +2 palpable, equal bilaterally   Exam of extremities: peripheral pulses normal, no pedal edema, trace pitting edema on distal legs, no clubbing or cyanosis        Labs:   Recent Labs     07/18/21  0405 07/19/21  0652   WBC 14.8* 13.6*   HGB 12.4 10.1*   HCT 39.9 32.5*   * 110*     Recent Labs     07/18/21  0405 07/19/21  0652    142   K 4.0 3.9    107   CO2 24 24   BUN 31* 33*   CREATININE 0.9 0.7   CALCIUM 9.5 9.1     Recent Labs     07/18/21  0405   AST 17   ALT 7*   BILITOT 0.4   ALKPHOS 62     Recent Labs     07/18/21  0405   INR 1.21*     No results for input(s): Jackelyn Nye in the last 72 hours. Microbiology:      Urinalysis:      Lab Results   Component Value Date    NITRU NEGATIVE 07/18/2021    WBCUA 0-2 07/18/2021    BACTERIA NONE SEEN 07/18/2021    RBCUA 0-2 07/18/2021    BLOODU NEGATIVE 07/18/2021    SPECGRAV 1.025 12/24/2014    GLUCOSEU NEGATIVE 07/18/2021       Radiology:  CTA CHEST W WO CONTRAST   Final Result       1. No pulmonary emboli. 2. Consolidation in the left lower lobe consistent with pneumonia. 3. Mild right basilar infiltrate versus atelectasis. **This report has been created using voice recognition software. It may contain minor errors which are inherent in voice recognition technology. **      Final report electronically signed by Dr. Audrey Lovett on 7/18/2021 10:43 AM      CT HEAD WO CONTRAST   Final Result   Impression:   No acute hemorrhage or definite acute infarct. This document has been electronically signed by: Bill Patterson MD on    07/18/2021 05:29 AM      All CTs at this facility use dose modulation techniques and iterative    reconstructions, and/or weight-based dosing   when appropriate to reduce radiation to a low as reasonably achievable. XR CHEST PORTABLE   Final Result   Impression:   1. New bilateral lower lobe airspace disease particularly on the left    likely pneumonic.       This document has been electronically signed by: Emilie Washington MD on    07/18/2021 04:42 AM          DVT prophylaxis: [x] Lovenox                                 [] SCDs                                 [] SQ Heparin                                 [] Encourage ambulation           [] Already on Anticoagulation     Code Status: DNR-CCA    PT/OT Eval Status: no  Tele:   [x] yes             [] no    Electronically signed by Julianne Rodriguez DO on 7/19/2021 at 1:34 PM

## 2021-07-19 NOTE — CARE COORDINATION
7/19/21, 11:36 AM EDT  DISCHARGE PLANNING EVALUATION:    Nadine Cohn       Admitted: 7/18/2021/ [place] 812 day: 1   Location: [facility]11/011-A Reason for admit: HCAP (healthcare-associated pneumonia) [J18.9]   PMH:  has a past medical history of Alzheimer disease ([facility] [address] Anxiety, Aphasia, Bipolar 2 disorder ([facility] [address] CAD (coronary artery disease), Cerebral artery occlusion with cerebral infarction [facility]), Chronic pain, Chronic UTI, Contracture of joint of shoulder region, left, Dementia ([facility] [address] Depression, Dysphagia, GERD (gastroesophageal reflux disease), Hyperlipidemia, Hypertension, Psychiatric problem, and Seizures ([facility] [address] Procedure:   7/18 CT Chest  1. No pulmonary emboli. 2. Consolidation in the left lower lobe consistent with pneumonia. 3. Mild right basilar infiltrate versus atelectasis. 7/19 ECHO planned  Barriers to Discharge:  Pneumonia w history dementia, Biplar, CVA, seizures. Palliative Care following. WBC 13.6, elevated BNP; monitor. IV Maxipime, IV Flagyl, IV Vancomycin, Oxygen 2L continued  PCP: Rodriguez Porter DO  Readmission Risk Score: 14%    Patient Goals/Plan/Treatment Preferences: plans return [facility] emile LUZ Manan Zuniga is guardian; client is nonverbal; has nebulizer, PEG TF; collaborated w MARISEL Munoz  Transportation/Food Security/Housekeeping Addressed:  No issues identified.

## 2021-07-19 NOTE — PROGRESS NOTES
Comprehensive Nutrition Assessment    Type and Reason for Visit:  Initial, Consult (TF)    Nutrition Recommendations/Plan:    Start Two Neftali HN  Per [facility] doesn't carry Nutren 2) 38ml/hr 1823-5610  If no IVF suggest free water flush 150ml every 4 hrs. Notice ECF flushes with 50ml water with each med pass every shift. Continue NPO. Nutrition Assessment:    Pt. nutritionally compromised AEB NPO Day 2. At risk for further nutrition compromise r/t admit with Acute Hypoxic Respiratory Failure Secondary to HCAP, Acute On Chronic HF, Elevated Troponin Secondary to Sepsis and underlying medical condition (Hx: Seizure, TF dependent, Nonverbal, Alzheimer's, Aphasia, CAD, CVA, Bipolar, Depression, Chronic UTI). Nutrition recommendations/interventions as per above. Malnutrition Assessment:  Malnutrition Status: At risk for malnutrition (Comment)    Context:  Acute Illness     Findings of the 6 clinical characteristics of malnutrition:  Energy Intake:   (< 50% intake x 1 day)  Weight Loss:  No significant weight loss     Body Fat Loss:  No significant body fat loss     Muscle Mass Loss:  No significant muscle mass loss    Fluid Accumulation:  No significant fluid accumulation     Strength:  Not Performed    Estimated Daily Nutrient Needs:  Energy (kcal):  1278-1598kcals (20-25kcals/kgm); Weight Used for Energy Requirements:   (63.9kgm (7/19) no edema bedscale)     Protein (g):  64-77 grams (1-1.2 grams protein/kgm); Weight Used for Protein Requirements:   (63.9kgm (7/19))        Fluid (ml/day):  per MD (HF pt); Nutrition Related Findings:  Pt seen, nonverbal, sleeping. Pt from [address] [place] [address] there reports pt tolerated Nutren 2 @ 38ml/hr 7117-1808 & unsure why it was a 20 hr/day feed. She reports pt NPO at [facility]. . Labs reviewed. Meds include: Lasix, Flagyl, Senokot.  BM x 1 (7/18)      Wounds:  None       Current Nutrition Therapies:    Diet NPO  ADULT TUBE FEEDING; PEG; 2.0 Calorie; Cyclic; 38; 6:00 AM; 2:00 AM; 150; Q 4 hours  Current Tube Feeding (TF) Orders:  · Feeding Route: PEG  · Formula: 2.0 Calorie  · Schedule: Cyclic  · Additives/Modulars:    · Water Flushes: If no IVF, 150ml free water every 4 hrs TF + water flush yields 1432ml water/24 hrs (22ml/kgm wt of 63.9kgm 7/19)  Water flush per MD/HF pt  · Current TF & Flush Orders Provides: [facility] TF Nutren 2 38ml/hr 9678-0929, flush with 150ml free water every 4 hrs yield 1520kcals, 760ml, 60.8 grams protein, 149 grams CHO, 534ml water from T. TF + water flush yields 1520kcals, 60.8 grams protein, 149 grams CHO, 534ml water from TF (24kcals/kgm, 1 gram protein/kgm wt of 63.9kgm 7/19))  · Goal TF & Flush Orders Provides: [facility] doesn't carry Nutren 2) 38ml/hr 9687-5440 yields 1520kcals, 166 grams CHO, 63 grams protein, 4 grams fiber, 532ml water from TF. Anthropometric Measures:  · Height: 5' 3\" (160 cm)  · Current Body Weight: 140 lb 14 oz (63.9 kg) (no edema)   · Admission Body Weight: 140 lb (63.5 kg) ((7/18))    · Usual Body Weight:  (per EMR: (6/26) 140# bedscale. per ECF: 129# (6/12))     · Ideal Body Weight: 115 lbs;     · BMI: 25  ·       · BMI Categories: Normal Weight (BMI 22.0 to 24.9) age over 72       Nutrition Diagnosis:   · Inadequate oral intake related to cognitive or neurological impairment, swallowing difficulty as evidenced by NPO or clear liquid status due to medical condition      Nutrition Interventions:   Food and/or Nutrient Delivery:  Continue NPO, Start Tube Feeding  Nutrition Education/Counseling:  Education not appropriate   Coordination of Nutrition Care:  Continue to monitor while inpatient    Goals:  Pt will receive atleast 75% estimated needs from TF during LOS.        Nutrition Monitoring and Evaluation:   Behavioral-Environmental Outcomes:  None Identified   Food/Nutrient Intake Outcomes:  Enteral Nutrition Intake/Tolerance  Physical Signs/Symptoms Outcomes:  Biochemical Data, Chewing or Swallowing, GI Status, Fluid Status or Edema, Hemodynamic Status, Nutrition Focused Physical Findings, Skin, Weight     Discharge Planning:     Too soon to determine     Electronically signed by Cristian Juan RD, IVORY on 7/19/21 at 11:02 AM EDT    Contact: [phone] gait, locomotion, and balance/muscle strength/aerobic capacity/endurance

## 2021-07-19 NOTE — DISCHARGE INSTR - COC
Continuity of Care Form    Patient Name: Quiana Frost   :  1950  MRN:  [id number]    Admit date:  2021  Discharge date:  2021    Code Status Order: DNR-CCA   Advance Directives:      Admitting Physician:  Alan Miranda MD  PCP: Marta Myers DO    Discharging Nurse: [facility] Unit/Room#: 4K-11/011-A  Discharging Unit Phone Number: ***    Emergency Contact:   Extended Emergency Contact Information  Primary Emergency Contact: [facility]  Address: \[facility]\""[address] [place] [address] Phone: [phone]  Mobile Phone: [phone]  Relation: Legal Guardian   needed? No  Secondary Emergency Contact: Socorro Bee  Address: [place]            /, [address] Phone: [phone]  Relation: Child    Past Surgical History:  Past Surgical History:   Procedure Laterality Date    GASTROSTOMY TUBE PLACEMENT N/A 2021    EGD PEG TUBE PLACEMENT performed by Singh Lomas MD at [facility]         Immunization History: There is no immunization history for the selected administration types on file for this patient.     Active Problems:  Patient Active Problem List   Diagnosis Code    Elevated troponin level R77.8    Abnormal ECG R94.31    Essential hypertension I10    Late onset Alzheimer's disease with behavioral disturbance (HCC) G30.1, F02.81    Seizure disorder ([facility] [address] G40.909    Schizophrenia ([facility] [address] F20.9    Coagulation defect ([facility] [address] D68.9    HCAP (healthcare-associated pneumonia) J18.9       Isolation/Infection:   Isolation            No Isolation          Patient Infection Status       Infection Onset Added Last Indicated Last Indicated By Review Planned Expiration Resolved Resolved By    None active    Resolved    COVID-19 Rule Out 21 COVID-19, Rapid (Ordered)   21 Rule-Out Test Resulted            Nurse Assessment:  Last Vital Signs: /69   Pulse 78   Temp 97.4 °F (36.3 °C) (Axillary)   Resp 21   Ht 5' 3\" (1.6 m)   Wt 140 lb 14 oz (63.9 kg)   SpO2 100%   BMI 24.95 kg/m²     Last documented pain score (0-10 scale): Pain Level: 0  Last Weight:   Wt Readings from Last 1 Encounters:   07/19/21 140 lb 14 oz (63.9 kg)     Mental Status:  responsive to voice/pain, nonverbal/disoriented    IV Access:  - None    Nursing Mobility/ADLs:  Walking   Dependent  Transfer  Dependent  Bathing  Dependent  Dressing  Dependent  Toileting  Dependent  Feeding  Dependent  Med Admin  Dependent  Med Delivery   crushed in PEG    Wound Care Documentation and Therapy:        Elimination:  Continence:   · Bowel: No  · Bladder: No  Urinary Catheter: None   Colostomy/Ileostomy/Ileal Conduit: No       Date of Last BM: 07/21/2021    Intake/Output Summary (Last 24 hours) at 7/19/2021 1018  Last data filed at 7/19/2021 0314  Gross per 24 hour   Intake 605.63 ml   Output 0 ml   Net 605.63 ml     I/O last 3 completed shifts: In: 605.6 [I.V.:605.6]  Out: 0     Safety Concerns:     Aspiration Risk, h/o seizures    Impairments/Disabilities:      Cognitive/Contractures    Nutrition Therapy:  Current Nutrition Therapy:   - Tube Feedings:  38 ml/hr over 20 hrs per day (2 aris HN in hospital)    Routes of Feeding: PEG tube  Liquids: 150ml water flush [id number], flush with med pass via PEG NPO  Daily Fluid Restriction: no  Last Modified Barium Swallow with Video (Video Swallowing Test): not done    Treatments at the Time of Hospital Discharge:   Respiratory Treatments: Oral/NT suction prn  Oxygen Therapy:  is not on home oxygen therapy.   Ventilator:    - No ventilator support    Rehab Therapies: ***  Weight Bearing Status/Restrictions: Non-weight bearing/bedbound  Other Medical Equipment (for information only, NOT a DME order):  ***  Other Treatments: ***    Patient's personal belongings (please select all that are sent with patient):  None    RN SIGNATURE: Electronically signed by Dona Curran RN on 7/21/21 at 5:05 PM EDT    CASE MANAGEMENT/SOCIAL WORK SECTION    Inpatient Status Date: 7/18/2021    Readmission Risk Assessment Score:  Readmission Risk              Risk of Unplanned Readmission:  14           Discharging to Facility/ Agency   · Name: [facility]  · Address: [address] [place] [address]  · Phone: [phone]  · Fax: [phone]    Dialysis Facility (if applicable)   · Name:  · Address:  · Dialysis Schedule:  · Phone:  · Fax:    / signature: Electronically signed by GAVIOTA Lara on 7/19/21 at 10:18 AM EDT    PHYSICIAN SECTION    Prognosis: {Prognosis:0499617786}    Condition at Discharge: [facility] Patient Condition:212799563}    Rehab Potential (if transferring to Rehab): {Prognosis:6563816458}    Recommended Labs or Other Treatments After Discharge: ***    Physician Certification: I certify the above information and transfer of Philippe Tovar  is necessary for the continuing treatment of the diagnosis listed and that she requires {Admit to Appropriate Level of Care:37446} for {GREATER/LESS:731964453} 30 days.      Update Admission H&P: {CHP DME Changes in VFUTD:510994968}    PHYSICIAN SIGNATURE:  Electronically signed by Chanell Avila MD on 7/21/2021 at 2:54 PM

## 2021-07-19 NOTE — PROGRESS NOTES
[address] [place] [address]  SPEECH THERAPY COMMUNICATION NOTE  STRZ ICU STEPDOWN TELEMETRY 4K      Date: 2021  Patient Name: Sue Ramires        MRN: [id number]    : 1950  (70 y.o.)    COMMUNICATION NOTE:  Novel order received per Jody Mireles MD. Per chart review, \" history of seizure, nonverbal and coma status, Alzheimer's disease, aphasia, tube feeding dependent, CAD, CVA, chronic UTI, hypertension, and hyperlipidema is admitted for acute hypoxic respiratory failure secondary to HCAP\". F/u conversation completed with AVANI Crowder re: pt status with nursing confirming no alertness/wakefulness. Perfect serve message completed to attending/ordering provider with discontinuation of ST order at this time. Please re-consult should further needs arise.        Ness Rodrigues M.A., [address] [place] [address]

## 2021-07-19 NOTE — CARE COORDINATION
7/19/21, 10:19 AM EDT  Discharge Planning Evaluation  Social work consult received, patient from [facility]. Patient/Family preference is to return to [facility]. The patient's current payor source at the facility is Medicaid. Medicare skilled days available: Yes  Insurance precert:  No  Spoke with Pat at the facility. Patient bed hold: Yes  Anticipated transport plan: Ambulance  Do they require COVID 19 test to return to ECF: Yes  Is there a required time frame which which COVID test needs done:24 hours    MARISEL spoke with Elisha Wallace, patient's guardian and he reported that patient plans to discharge back to [facility] when discharged. MARISEL spoke with Altagracia Lynch at [facility] and she reported that patient is a medicaid bed hold at facility. She reported that patient is not skillable and will return under Medicaid benefit. [facility] to accept when discharged.

## 2021-07-20 ENCOUNTER — APPOINTMENT (OUTPATIENT)
Dept: GENERAL RADIOLOGY | Age: 71
DRG: 871 | End: 2021-07-20
Payer: MEDICARE

## 2021-07-20 PROBLEM — A41.9 SEPSIS DUE TO PNEUMONIA (HCC): Status: ACTIVE | Noted: 2021-07-18

## 2021-07-20 LAB
ALBUMIN SERPL-MCNC: 2.5 G/DL (ref 3.5–5.1)
ALP BLD-CCNC: 48 U/L (ref 38–126)
ALT SERPL-CCNC: 6 U/L (ref 11–66)
ANION GAP SERPL CALCULATED.3IONS-SCNC: 10 MEQ/L (ref 8–16)
AST SERPL-CCNC: 15 U/L (ref 5–40)
BASOPHILS # BLD: 0.4 %
BASOPHILS ABSOLUTE: 0 THOU/MM3 (ref 0–0.1)
BILIRUB SERPL-MCNC: 0.3 MG/DL (ref 0.3–1.2)
BUN BLDV-MCNC: 30 MG/DL (ref 7–22)
CALCIUM SERPL-MCNC: 8.8 MG/DL (ref 8.5–10.5)
CHLORIDE BLD-SCNC: 106 MEQ/L (ref 98–111)
CO2: 23 MEQ/L (ref 23–33)
CREAT SERPL-MCNC: 0.6 MG/DL (ref 0.4–1.2)
EOSINOPHIL # BLD: 1.8 %
EOSINOPHILS ABSOLUTE: 0.2 THOU/MM3 (ref 0–0.4)
ERYTHROCYTE [DISTWIDTH] IN BLOOD BY AUTOMATED COUNT: 14.9 % (ref 11.5–14.5)
ERYTHROCYTE [DISTWIDTH] IN BLOOD BY AUTOMATED COUNT: 54.1 FL (ref 35–45)
GFR SERPL CREATININE-BSD FRML MDRD: > 90 ML/MIN/1.73M2
GLUCOSE BLD-MCNC: 80 MG/DL (ref 70–108)
HCT VFR BLD CALC: 32.2 % (ref 37–47)
HEMOGLOBIN: 9.8 GM/DL (ref 12–16)
IMMATURE GRANS (ABS): 0.08 THOU/MM3 (ref 0–0.07)
IMMATURE GRANULOCYTES: 0.8 %
LEGIONELLA PNEUMOPHILIA AG, URINE: NEGATIVE
LYMPHOCYTES # BLD: 23.2 %
LYMPHOCYTES ABSOLUTE: 2.3 THOU/MM3 (ref 1–4.8)
MCH RBC QN AUTO: 30.1 PG (ref 26–33)
MCHC RBC AUTO-ENTMCNC: 30.4 GM/DL (ref 32.2–35.5)
MCV RBC AUTO: 98.8 FL (ref 81–99)
MONOCYTES # BLD: 8.2 %
MONOCYTES ABSOLUTE: 0.8 THOU/MM3 (ref 0.4–1.3)
NUCLEATED RED BLOOD CELLS: 0 /100 WBC
PLATELET # BLD: 128 THOU/MM3 (ref 130–400)
PMV BLD AUTO: 11.3 FL (ref 9.4–12.4)
POTASSIUM SERPL-SCNC: 3.5 MEQ/L (ref 3.5–5.2)
RBC # BLD: 3.26 MILL/MM3 (ref 4.2–5.4)
SEG NEUTROPHILS: 65.6 %
SEGMENTED NEUTROPHILS ABSOLUTE COUNT: 6.4 THOU/MM3 (ref 1.8–7.7)
SODIUM BLD-SCNC: 139 MEQ/L (ref 135–145)
STREP PNEUMO AG, UR: NEGATIVE
TOTAL PROTEIN: 6.3 G/DL (ref 6.1–8)
WBC # BLD: 9.8 THOU/MM3 (ref 4.8–10.8)

## 2021-07-20 PROCEDURE — 2500000003 HC RX 250 WO HCPCS: Performed by: FAMILY MEDICINE

## 2021-07-20 PROCEDURE — 6370000000 HC RX 637 (ALT 250 FOR IP): Performed by: FAMILY MEDICINE

## 2021-07-20 PROCEDURE — 6360000002 HC RX W HCPCS: Performed by: EMERGENCY MEDICINE

## 2021-07-20 PROCEDURE — 99232 SBSQ HOSP IP/OBS MODERATE 35: CPT | Performed by: FAMILY MEDICINE

## 2021-07-20 PROCEDURE — 6360000004 HC RX CONTRAST MEDICATION: Performed by: FAMILY MEDICINE

## 2021-07-20 PROCEDURE — 2060000000 HC ICU INTERMEDIATE R&B

## 2021-07-20 PROCEDURE — 36415 COLL VENOUS BLD VENIPUNCTURE: CPT

## 2021-07-20 PROCEDURE — 6370000000 HC RX 637 (ALT 250 FOR IP): Performed by: HOSPITALIST

## 2021-07-20 PROCEDURE — 6360000002 HC RX W HCPCS: Performed by: HOSPITALIST

## 2021-07-20 PROCEDURE — 80053 COMPREHEN METABOLIC PANEL: CPT

## 2021-07-20 PROCEDURE — 2580000003 HC RX 258: Performed by: HOSPITALIST

## 2021-07-20 PROCEDURE — 85025 COMPLETE CBC W/AUTO DIFF WBC: CPT

## 2021-07-20 PROCEDURE — 2580000003 HC RX 258: Performed by: EMERGENCY MEDICINE

## 2021-07-20 PROCEDURE — 49465 FLUORO EXAM OF G/COLON TUBE: CPT

## 2021-07-20 RX ORDER — POTASSIUM CHLORIDE 20 MEQ/1
40 TABLET, EXTENDED RELEASE ORAL PRN
Status: DISCONTINUED | OUTPATIENT
Start: 2021-07-20 | End: 2021-07-21 | Stop reason: HOSPADM

## 2021-07-20 RX ORDER — POTASSIUM CHLORIDE 7.45 MG/ML
10 INJECTION INTRAVENOUS PRN
Status: DISCONTINUED | OUTPATIENT
Start: 2021-07-20 | End: 2021-07-21 | Stop reason: HOSPADM

## 2021-07-20 RX ADMIN — ENOXAPARIN SODIUM 40 MG: 40 INJECTION, SOLUTION INTRAVENOUS; SUBCUTANEOUS at 20:29

## 2021-07-20 RX ADMIN — LORAZEPAM 0.5 MG: 2 SOLUTION, CONCENTRATE ORAL at 20:33

## 2021-07-20 RX ADMIN — SODIUM CHLORIDE, PRESERVATIVE FREE 10 ML: 5 INJECTION INTRAVENOUS at 08:00

## 2021-07-20 RX ADMIN — METRONIDAZOLE 500 MG: 500 INJECTION, SOLUTION INTRAVENOUS at 22:32

## 2021-07-20 RX ADMIN — LORAZEPAM 0.5 MG: 2 SOLUTION, CONCENTRATE ORAL at 13:23

## 2021-07-20 RX ADMIN — ACETAMINOPHEN 650 MG: 325 TABLET ORAL at 20:29

## 2021-07-20 RX ADMIN — TRAZODONE HYDROCHLORIDE 100 MG: 100 TABLET ORAL at 20:29

## 2021-07-20 RX ADMIN — CEFEPIME HYDROCHLORIDE 2000 MG: 2 INJECTION, POWDER, FOR SOLUTION INTRAVENOUS at 17:38

## 2021-07-20 RX ADMIN — SODIUM CHLORIDE, PRESERVATIVE FREE 10 ML: 5 INJECTION INTRAVENOUS at 20:29

## 2021-07-20 RX ADMIN — METRONIDAZOLE 500 MG: 500 INJECTION, SOLUTION INTRAVENOUS at 06:08

## 2021-07-20 RX ADMIN — POTASSIUM BICARBONATE 40 MEQ: 782 TABLET, EFFERVESCENT ORAL at 15:16

## 2021-07-20 RX ADMIN — CEFEPIME HYDROCHLORIDE 2000 MG: 2 INJECTION, POWDER, FOR SOLUTION INTRAVENOUS at 04:28

## 2021-07-20 RX ADMIN — METRONIDAZOLE 500 MG: 500 INJECTION, SOLUTION INTRAVENOUS at 15:14

## 2021-07-20 RX ADMIN — VALPROIC ACID 1000 MG: 250 SOLUTION ORAL at 15:14

## 2021-07-20 RX ADMIN — Medication 8.8 MG: at 20:29

## 2021-07-20 RX ADMIN — DIATRIZOATE MEGLUMINE AND DIATRIZOATE SODIUM 30 ML: 600; 100 SOLUTION ORAL; RECTAL at 11:48

## 2021-07-20 RX ADMIN — VALPROIC ACID 1000 MG: 250 SOLUTION ORAL at 20:36

## 2021-07-20 RX ADMIN — LORAZEPAM 0.5 MG: 2 SOLUTION, CONCENTRATE ORAL at 04:35

## 2021-07-20 RX ADMIN — ACETAMINOPHEN 650 MG: 325 TABLET ORAL at 15:14

## 2021-07-20 ASSESSMENT — PAIN SCALES - GENERAL
PAINLEVEL_OUTOF10: 0

## 2021-07-20 NOTE — CARE COORDINATION
7/20/21, 1:29 PM EDT    DISCHARGE ON GOING [address] [employer] day: 2  Location: [facility]11/011-A Reason for admit: HCAP (healthcare-associated pneumonia) [J18.9]   Procedure:   7/18 CT Chest  1. No pulmonary emboli. 2. Consolidation in the left lower lobe consistent with pneumonia. 3. Mild right basilar infiltrate versus atelectasis. 7/20 Gastrogaffin Study for PEG planned  Barriers to Discharge:  Pneumonia w history dementia, Bipolar, CVA, seizures. Palliative Care following. WBC 13.6, elevated BNP; monitor. IV Maxipime, IV Flagyl, Oxygen 0.5L continued.  GI consulted today for possible GT malfunction; plans procedure above today  PCP: Roda Olszewski, DO  Readmission Risk Score: 14%     Patient Goals/Plan/Treatment Preferences: plans return [facility], nephew Judi Nice is guardian; client is nonverbal; has nebulizer, PEG TF; monitor AB plans (await final cultures); collaborated MARISEL Coughlin

## 2021-07-20 NOTE — PROGRESS NOTES
This nurse spoke with Jodee Kraus CNP regarding pt consult and concern for possible malfunction/misplacement of PEG tube. Order for gastrografin study on PEG placed, verbal order to hold tube feed/meds until after results of study.

## 2021-07-20 NOTE — PLAN OF CARE
Problem: Skin Integrity:  Goal: Absence of new skin breakdown  Description: Absence of new skin breakdown  7/20/2021 0053 by Josefina Wise RN  Outcome: Ongoing  Note: No new skin breakdown noted. Continue q2hr turns, supporting bony prominences with pillows, and elevating the heels off the bed. Continue to monitor. Problem: Falls - Risk of:  Goal: Will remain free from falls  Description: Will remain free from falls  7/20/2021 0053 by Josefina Wise RN  Outcome: Ongoing  Note: Pt remains free of falls during the night. Bed in the lowest position, items and call ight placed within reach, side rails up x2, and bed alarm placed on. Continue to monitor. Problem: Nutrition  Goal: Optimal nutrition therapy  7/20/2021 0053 by Josefina Wise RN  Outcome: Ongoing  Note: Pt remains NPO during the day of care. Continue Continuous tube feeds @ 38mL/hr from 4219-7796. Problem: Pain:  Goal: Control of acute pain  Description: Control of acute pain  Outcome: Ongoing  Note: Pt does not complain of pain during the night. Continue rest, repositioning, and evaluating pain during hourly rounding. Problem: Discharge Planning:  Intervention: Assess knowledge level of healthcare  Note: Pt will be D/C to [facility] when medically stable. Care plan reviewed with patient .

## 2021-07-20 NOTE — PLAN OF CARE
Problem: Skin Integrity:  Goal: Will show no infection signs and symptoms  Description: Will show no infection signs and symptoms  Outcome: Ongoing   Afebrile this shift, no sxs infection to skin areas. On atb for pneumonia  Problem: Skin Integrity:  Goal: Absence of new skin breakdown  Description: Absence of new skin breakdown  Outcome: Ongoing   No sxs new skin breakdown this shift, small red area to buttocks with barrier cream implemented preventatively. Turned every 2 hours in bed/pillow support. Problem: Falls - Risk of:  Goal: Will remain free from falls  Description: Will remain free from falls  Outcome: Ongoing   No falls this shift, pt bedbound and turned in bed every 2 hours. Bed alarm on and bed in lowest position during hourly rounds, pt only responsive to pain and  unable to use call light. Problem: Nutrition  Goal: Optimal nutrition therapy  7/19/2021 2003 by Pawel Denson RN  Outcome: Ongoing   Dietician consulted and pt started on 2 aris HN tube feed at 38/hr with flushes Q4. Care plan reviewed with patient. Patient verbalizes understanding of the plan of care and contributes to goal setting.

## 2021-07-20 NOTE — PLAN OF CARE
Problem: Skin Integrity:  Goal: Will show no infection signs and symptoms  Description: Will show no infection signs and symptoms  7/20/2021 0758 by Apolinar Melgoza RN  Outcome: Ongoing  No evidence of infection to PEG tube site, on atb x2 to treat pneumonia, afebrile so far this shift. Problem: Skin Integrity:  Goal: Absence of new skin breakdown  Description: Absence of new skin breakdown  7/20/2021 0758 by Apolinar Melgoza RN  Outcome: Ongoing   No sxs new skin breakdown, barrier cream utilized to buttocks and repositioned in bed every 2 hours. HOB maintained 30 degrees or higher, keeping pt as low as possible to reduce pressure to buttocks but consistent with requirements r/t PEG tube feed/aspiration precautions. Problem: Falls - Risk of:  Goal: Will remain free from falls  Description: Will remain free from falls  7/20/2021 0758 by Apolinar Melgoza RN  Outcome: Ongoing   No falls this shift, pt nonresponsive and bedbound. Bed alarm on and bed in lowest position during hourly rounds. Problem: Nutrition  Goal: Optimal nutrition therapy  7/20/2021 0758 by Apolinar Melgoza RN  Outcome: Ongoing   Tube feed stopped this morning and meds held d/t concern for potential PEG malfunction/misplacement. Awaiting placement verification to resume tube feed. Problem: Pain:  Description: Pain management should include both nonpharmacologic and pharmacologic interventions. Goal: Control of acute pain  Description: Control of acute pain  7/20/2021 0758 by Apolinar Melgoza RN  Outcome: Ongoing   Repositioned for comfort, no sxs pain while pt resting in bed. Tolerates repositioning without grimacing. Care plan reviewed with patient. Patient verbalizes understanding of the plan of care and contributes to goal setting.

## 2021-07-20 NOTE — CONSULTS
[address] CONSULT      Patient: Elis Villatoro  : 1950  Acct#: [de-identified]  Consult seen for:   Elis Villatoro is a 70 y.o. , female with possible PEG tube malfunction      ASSESSMENT:     1. Possible G-tube malfunction  2. Acute hypoxic respiratory failure/pneumonia this admission  3. Anemia; stable H&H 9.8/32.2  4. Thrombocytopenia; 128  5. Sepsis this admission  6. Other comorbidities include congestive heart failure, hypotension, history of seizures, Alzheimer's disease      PLAN:   1. When we were paged earlier about the tube malfunction concern, we ordered G-tube fluoroscopy with Gastrografin. Those results came back confirming G-tube placement is correct ; G tube should be able to be used. See below results  2. If any further issues with the G-tube, please page DR Agusto Moran      Gastric tube Gastrografin study  Impression   1. Postcontrast images demonstrate contrast within the gastric lumen. No gross evidence of extravasation from the gastric lumen is seen. The balloon from the gastrostomy tube overlies the gastric body. HISTORY OF PRESENT ILLNESS     patient sitting in bed. G tube is working and tube feed infusing currently    She does not open her eyes or verbalize anything. RN denies any further concerns. IF any further issues, the staff will page us. EGD: With G-tube placement 2021  Colonoscopy: Unknown  Family history of GI malignancy:  None documented. Patient has history of dementia       PROBLEM LIST    does not have any pertinent problems on file.     PAST MEDICAL HISTORY     has a past medical history of Alzheimer disease ([facility] [address] Anxiety, Aphasia, Bipolar 2 disorder ([address] CAD (coronary artery disease), Cerebral artery occlusion with cerebral infarction [facility]), Chronic pain, Chronic UTI, Contracture of joint of shoulder region, left, Dementia ([address] Depression, Dysphagia, GERD (gastroesophageal reflux disease), Hyperlipidemia, Hypertension, Psychiatric problem, and Seizures ([facility] [address] PAST SURGICAL HISTORY      has a past surgical history that includes Tonsillectomy; Tubal ligation; and Gastrostomy tube placement (N/A, 2/17/2021). LABS:    CBC:   Recent Labs     07/18/21 0405 07/19/21  0652 07/20/21  0552   WBC 14.8* 13.6* 9.8   HGB 12.4 10.1* 9.8*   * 110* 128*     BMP:    Recent Labs     07/18/21  0405 07/19/21  0652 07/20/21  0552    142 139   K 4.0 3.9 3.5    107 106   CO2 24 24 23   BUN 31* 33* 30*   CREATININE 0.9 0.7 0.6   GLUCOSE 173* 86 80     Hepatic:   Recent Labs     07/18/21 0405 07/20/21  0552   ALKPHOS 62 48   ALT 7* 6*   AST 17 15   PROT 7.5 6.3   BILITOT 0.4 0.3   LABALBU 3.4* 2.5*     Amylase and Lipase:No results for input(s): LIPASE, AMYLASE in the last 72 hours. Lactic Acid:   Recent Labs     07/18/21  0405   LACTA 1.6     Calcium:  Recent Labs     07/20/21  0552   CALCIUM 8.8     Ionized Calcium:No results for input(s): IONCA in the last 72 hours. Magnesium:No results for input(s): MG in the last 72 hours. Phosphorus:No results for input(s): PHOS in the last 72 hours. Troponin: No results for input(s): CKTOTAL, CKMB, TROPONINI in the last 72 hours. PT/INR:     Recent Labs     07/18/21 0405   INR 1.21*         REVIEW OF SYSTEMS:    GENERAL:  No fever, weight loss. RESPIRATORY:  No dyspnea or cough. GI: possible g tube malfunction  :   No hematuria. SKIN:  No rashes or jaundice. NEUROLOGIC:   No  seizures  ENDOCRINE:   No polyuria   BLOOD:  + chronic anemia, no bleeding disorder. PHYSICAL EXAMINATION:      Vitals:    07/20/21 1545   BP:    Pulse:    Resp:    Temp:    SpO2: 95%     GEN: Well nourished, well developed. LUNGS:  . Chest rises equally on inspiration. CARDIOVASCULAR:  On tele  ABDOMEN:  Soft, nontender and nondistended   EYES: PRECIOUS. ENT:  Ears symmetric, Neck supple, trachea midline, moist mucous membranes     DERM:  No rash or jaundice.     NEURO: Does not verbalize    PSYCHIATRIC: calm    HOME MEDICATIONS      Prior to Admission medications    Medication Sig Start Date End Date Taking? Authorizing Provider   donepezil (ARICEPT) 10 MG tablet 10 mg by Per G Tube route nightly    Yes Historical Provider, MD   furosemide (LASIX) 10 MG/ML solution 20 mg by Per G Tube route daily    Yes Historical Provider, MD   famotidine (PEPCID) 40 MG/5ML suspension 20 mg by Per G Tube route daily    Yes Historical Provider, MD   scopolamine (TRANSDERM-SCOP) transdermal patch Place 1 patch onto the skin every 72 hours   Yes Historical Provider, MD   senna (SENOKOT) 8.6 MG tablet 1 tablet by Per G Tube route daily    Yes Historical Provider, MD   traZODone (DESYREL) 100 MG tablet 100 mg by Per G Tube route nightly    Yes Historical Provider, MD   Multiple Vitamins-Minerals (THERAPEUTIC MULTIVITAMIN-MINERALS) tablet 1 tablet by Per G Tube route 2 times daily    Yes Historical Provider, MD   LORazepam (ATIVAN) 2 MG/ML concentrated solution 0.5 mg by Per G Tube route every 8 hours.    Yes Historical Provider, MD   acetaminophen (TYLENOL) 500 MG tablet 1,000 mg by Per G Tube route 3 times daily   Yes Historical Provider, MD   ipratropium-albuterol (DUONEB) 0.5-2.5 (3) MG/3ML SOLN nebulizer solution Inhale 1 vial into the lungs every 4 hours as needed for Shortness of Breath   Yes Historical Provider, MD   Menthol, Topical Analgesic, (BIOFREEZE) 4 % GEL Apply to bilateral knees topically three times daily for pain   Yes Historical Provider, MD   aspirin 81 MG chewable tablet 81 mg by Per G Tube route daily   Yes Historical Provider, MD   valproic acid (DEPACON) 250 MG/5ML SOLN oral solution 1,000 mg by Per G Tube route every 8 hours    Yes Historical Provider, MD   Magnesium Hydroxide (MILK OF MAGNESIA PO) 30 mLs by Per G Tube route daily as needed (constipation)    Yes Historical Provider, MD   meloxicam (MOBIC) 7.5 MG tablet 7.5 mg by Per G Tube route daily    Yes Historical Provider, MD   metoprolol (LOPRESSOR) 25 MG tablet 12.5 mg by Per G Tube route daily Hold if SBP < 110 or HR < 55   Yes Historical Provider, MD   acetaminophen (TYLENOL) 325 MG tablet 650 mg by Per G Tube route every 4 hours as needed for Pain or Fever    Yes Historical Provider, MD       MEDICATIONS    Scheduled Meds:   metroNIDAZOLE  500 mg Intravenous Q8H    cefepime  2,000 mg Intravenous Q12H    aspirin  81 mg Oral Daily    donepezil  10 mg Oral Daily    furosemide  20 mg Oral Daily    metoprolol tartrate  12.5 mg Oral Daily    traZODone  100 mg Oral Nightly    acetaminophen  650 mg Oral TID    senna  5 mL Oral Nightly    sodium chloride flush  5-40 mL Intravenous 2 times per day    enoxaparin  40 mg Subcutaneous Q24H    midodrine  5 mg PEG Tube TID WC    famotidine  20 mg Oral Daily    valproic acid  1,000 mg Oral TID    LORazepam  0.5 mg Oral Q8H     Continuous Infusions:   sodium chloride       PRN Meds:.potassium chloride **OR** potassium alternative oral replacement **OR** potassium chloride, diatrizoate meglumine-sodium, sodium chloride flush, sodium chloride, ondansetron **OR** ondansetron, polyethylene glycol, acetaminophen **OR** acetaminophen, ipratropium-albuterol    ALLERGIES   is allergic to pcn [penicillins].     SOCIAL HISTORY    Social History  Social History     Socioeconomic History    Marital status:      Spouse name: None    Number of children: 10    Years of education: 6    Highest education level: None   Occupational History    None   Tobacco Use    Smoking status: Former Smoker     Types: Cigarettes    Smokeless tobacco: Never Used   Vaping Use    Vaping Use: Never assessed   Substance and Sexual Activity    Alcohol use: No    Drug use: Yes     Types: Marijuana    Sexual activity: Never   Other Topics Concern    None   Social History Narrative    None     Social Determinants of Health     Financial Resource Strain:     Difficulty of Paying Living Expenses:    Food Insecurity:     Worried About Running Out of Food in the Last Year:     [address] in the Last Year:    Transportation Needs:     Lack of Transportation (Medical):  Lack of Transportation (Non-Medical):    Physical Activity:     Days of Exercise per Week:     Minutes of Exercise per Session:    Stress:     Feeling of Stress :    Social Connections:     Frequency of Communication with Friends and Family:     Frequency of Social Gatherings with Friends and Family:     Attends [employer] Services:     Active Member of Clubs or Organizations:     Attends Club or Organization Meetings:     Marital Status:    Intimate Partner Violence:     Fear of Current or Ex-Partner:     Emotionally Abused:     Physically Abused:     Sexually Abused:        FAMILY HISTORY    family history includes Other in her mother. REVIEW OF DIAGNOSTIC TESTING AND LABS:        Hospitalist/Attending provider notes, consulting physician notes, laboratory results and procedure notes reviewed prior to seeing the patient. Note done in collaboration with DR Maurisio Christie.    Electronically signed by ALBINO Pelletier CNP on 7/20/21 at 4:56 PM EDT

## 2021-07-20 NOTE — PROGRESS NOTES
Hospitalist Progress Note    Patient:  Ismael Ward      Unit/Bed:4K-11/011-A    YOB: 1950    MRN: [id number]       Acct: [de-identified]     PCP: Rachel Woodard DO    Date of Admission: 7/18/2021    Assessment/Plan:     1. Acute hypoxic respiratory failure secondary to CAP with concerns for aspiration pneumonia, improving patient now on 0.5 L nonrebreather mask O2 breathing 98% SpO2 with PEG tube placement from Feb 2021 and history of dysphagia; ABG showed mild respiratory alkalosis; repeat ABG unchanged; continue oxygen supplementation; chest CTA ordered to rule out pulmonary embolism ; chest x-ray showed new bilateral lower lobe airspace disease particularly on the left; CTA showed consolidation in the left lower lobe consistent with pneumonia and mild right basilar infiltrate vs atelectasis on the right;patient on cefepime, flagyl, , duonebs prn; vancomycin was discontinued on 7/20/21 as MRSA PCR (-); can consider adding zosyn to regiment for aspiration pneumonia however patient is allergic to penicillin ( unclear reaction), discussed with pharmacy and recommend adding flagyl   -Patient procalcitonin 0.49; uptrending from 0.42  -WBC 9.8 downtrending from 13.6  -Blood cultures pending  -MRSA swab/screen negative vancomycin stopped   -Legionella antigen negative  -COVID and flu antigens negative  -Strep pneumoniae antigen negative  -UA negative     2. Sepsis (POA) due to CAP, resolved: febrile, tachypneic, and had leukocytosis, hypotensive on arrival.  Lactic acid normal.  Patient received IV fluids bolus per sepsis protocol in the ED, IV fluids maintenance did not order during admission due to CHF exacerbation. Vital signs better today. Leukocytosis resolved. Blood cultures no growth preliminary. Continue  cefepime ( day 3) for CAP, and metronidazole ( day 2) for possible aspiration pneumonia.     3. Acute on chronic HPEF, improved: -proBNP elevated at 2355 on admission now downtrending to 775.3  -EF 55 to 07%, grade 1 diastolic heart failure per echocardiogram in 2015  -Echo ordered and showed ejection fraction 60% with grade 1 diastolic dysfunction and no regional left ventricular wall abnormalities; patient on furosemide PO 20 mg daily  -Patient has crackles on lower lung bases bilaterally, no peripheral edema on examination.  -Patient received IV fluid bolus per sepsis protocol in the ED, IV fluid maintenance did not order during admission, will hold for now due to possible CHF exacerbation  -Daily weights, I/Os, fluid and salt restrictions; keep K>4 and Mg>2    4. Elevated troponinX2 ( mildly elevated) possibly secondary to sepsis/pneumonia:  EKG showed sinus tachycardia; cannot confirm symptoms of ACS due to patient's nonverbal status; will continue to monitor; patient on aspirin 81 mg    5. Hypotension secondary to sepsis, resolved: -started midodrine 5 mg via PEG tube TID with holding parameters   -Holding IV fluids for now due to possible CHF exacerbation  -Continue to monitor vital signs per floor protocol    6. History of seizures: continue home medications    7. History of Alzheimer's disease: continue home medications    8. History of hypertension: patient on lopressor 12.5 mg daily; continue to monitor given patient's recent hypotension secondary to sepsis    9. Dysphagia: cont NPO, on PEG tube feeding; on 7/20/21; patient is continuing to have rhonchorous breath sounds possibly secondary to lack of resistance from PEG tube/questionable peg tube malfunction; GI consulted; appreciate recs     10. HLD: not on statin at this time; can possibly follow-up outpatient    11. Psychiatric d/o: cont home meds     12. Thrombocytopenia likely due to sepsis , resolving: CBC in am     13. At risk for malnutrition: dietician on-board.        Expected discharge date:  TBD    Disposition:    [x] Home       [] TCU       [] Rehab       [] Psych       [] SNF       [] [facility]       [] Other-     Chief Complaint: fever and shortness of breath    Hospital Course:   70 y.o. female who presented to [address] [place] [address] with history of seizure, nonverbal and coma status, Alzheimer's disease, aphasia, tube feeding dependent, CAD, CVA, chronic UTI, hypertension, and hyperlipidema is admitted for acute hypoxic respiratory failure secondary to HCAP. She is a poor historian. She was brought to the ED for fever and SOB. She was saturating on 90% on RA. As of 7/19/2021, the patient is SpO2 100 on 2 L nasal cannula and currently being treated for concerns of aspiration pneumonia with vancomycin and flagyl. Subjective (past 24 hours): Per the nurse, the patient is currently have a lot of rhonchorous breath sounds, and the nurse thought it was due to her PEG tube having no resistance. GI was consulted regarding this matter. Otherwise the patient is more interactive today and is attempting to talk and interact with us. ROS (12 point review of systems completed. Pertinent positives noted. Otherwise ROS is negative).     Medications:  Reviewed    Infusion Medications    sodium chloride       Scheduled Medications    metroNIDAZOLE  500 mg Intravenous Q8H    cefepime  2,000 mg Intravenous Q12H    aspirin  81 mg Oral Daily    donepezil  10 mg Oral Daily    furosemide  20 mg Oral Daily    metoprolol tartrate  12.5 mg Oral Daily    traZODone  100 mg Oral Nightly    acetaminophen  650 mg Oral TID    senna  5 mL Oral Nightly    sodium chloride flush  5-40 mL Intravenous 2 times per day    enoxaparin  40 mg Subcutaneous Q24H    midodrine  5 mg PEG Tube TID WC    famotidine  20 mg Oral Daily    valproic acid  1,000 mg Oral TID    LORazepam  0.5 mg Oral Q8H     PRN Meds: potassium chloride **OR** potassium alternative oral replacement **OR** potassium chloride, diatrizoate meglumine-sodium, sodium chloride flush, sodium chloride, ondansetron **OR** ondansetron, polyethylene glycol, acetaminophen **OR** acetaminophen, ipratropium-albuterol      Intake/Output Summary (Last 24 hours) at 7/20/2021 1620  Last data filed at 7/20/2021 1547  Gross per 24 hour   Intake 879.63 ml   Output 0 ml   Net 879.63 ml       Diet:  Diet NPO  ADULT TUBE FEEDING; PEG; 2.0 Calorie; Cyclic; 38; 7:64 AM; 1:38 AM; 150; Q 4 hours    Exam:  BP (!) 159/72   Pulse 70   Temp 99.5 °F (37.5 °C) (Oral)   Resp 17   Ht 5' 3\" (1.6 m)   Wt 142 lb (64.4 kg)   SpO2 95%   BMI 25.15 kg/m²     General appearance: No apparent distress, appears stated age and cooperative. HEENT: Pupils equal, round, and reactive to light. Conjunctivae/corneas clear. Neck: Supple, with full range of motion. No jugular venous distention. Trachea midline. Respiratory: Patient currently breathing 0.5 L 98% on nasal cannula; mild crackles appreciated on lung bases; no wheezing;+rhonchi; normal respiratory effort  Cardiovascular: normal rate, regular rhythm with normal S1/S2 without murmurs, rubs or gallops. Abdomen: Soft, non-tender, non-distended with normal bowel sounds. Musculoskeletal: passive and active ROM x 4 extremities. Skin: Skin color, texture, turgor normal.  No rashes or lesions.   Neurologic:  Patient nonverbal; aphasic, but is more alert and interactive today  Psychiatric: Alert and oriented, thought content appropriate, normal insight  Capillary Refill: Brisk,< 3 seconds   Peripheral Pulses: +2 palpable, equal bilaterally       Labs:   Recent Labs     07/18/21  0405 07/19/21  0652 07/20/21  0552   WBC 14.8* 13.6* 9.8   HGB 12.4 10.1* 9.8*   HCT 39.9 32.5* 32.2*   * 110* 128*     Recent Labs     07/18/21  0405 07/19/21  0652 07/20/21  0552    142 139   K 4.0 3.9 3.5    107 106   CO2 24 24 23   BUN 31* 33* 30*   CREATININE 0.9 0.7 0.6   CALCIUM 9.5 9.1 8.8     Recent Labs     07/18/21 0405 07/20/21  0552   AST 17 15   ALT 7* 6*   BILITOT 0.4 0.3   ALKPHOS 62 48     Recent Labs     07/18/21  0405   INR 1.21*     No results for input(s): Von Bianchi in the last 72 hours. Microbiology:      Urinalysis:      Lab Results   Component Value Date    NITRU NEGATIVE 07/18/2021    WBCUA 0-2 07/18/2021    BACTERIA NONE SEEN 07/18/2021    RBCUA 0-2 07/18/2021    BLOODU NEGATIVE 07/18/2021    SPECGRAV 1.025 12/24/2014    GLUCOSEU NEGATIVE 07/18/2021       Radiology:  XR INJ CONTRAST GASTRIC TUBE PERC   Final Result   1. Postcontrast images demonstrate contrast within the gastric lumen. No gross evidence of extravasation from the gastric lumen is seen. The balloon from the gastrostomy tube overlies the gastric body. **This report has been created using voice recognition software. It may contain minor errors which are inherent in voice recognition technology. **      Final report electronically signed by Dr. Nina Aldridge on 7/20/2021 2:41 PM      CTA CHEST [address]   Final Result       1. No pulmonary emboli. 2. Consolidation in the left lower lobe consistent with pneumonia. 3. Mild right basilar infiltrate versus atelectasis. **This report has been created using voice recognition software. It may contain minor errors which are inherent in voice recognition technology. **      Final report electronically signed by Dr. Xu Howell on 7/18/2021 10:43 AM      CT HEAD WO CONTRAST   Final Result   Impression:   No acute hemorrhage or definite acute infarct. This document has been electronically signed by: Radha Bernal MD on    07/18/2021 05:29 AM      All CTs at this facility use dose modulation techniques and iterative    reconstructions, and/or weight-based dosing   when appropriate to reduce radiation to a low as reasonably achievable. XR CHEST PORTABLE   Final Result   Impression:   1. New bilateral lower lobe airspace disease particularly on the left    likely pneumonic.       This document has been electronically signed by: Prasad Henderson MD on    07/18/2021 04:42 AM          DVT prophylaxis: [x] Lovenox                                 [] SCDs                                 [] SQ Heparin                                 [] Encourage ambulation           [] Already on Anticoagulation     Code Status: DNR-CCA    PT/OT Eval Status: no    Tele:   [x] yes             [] no    Electronically signed by Carlos Serna DO on 7/20/2021 at 4:20 PM

## 2021-07-20 NOTE — PROGRESS NOTES
This nurse called inpatient GI consult to [facility]. She is sending referral to Osmin Coley CNP to consult.

## 2021-07-21 VITALS
TEMPERATURE: 98.3 F | WEIGHT: 142.64 LBS | OXYGEN SATURATION: 95 % | HEART RATE: 65 BPM | SYSTOLIC BLOOD PRESSURE: 127 MMHG | BODY MASS INDEX: 25.27 KG/M2 | RESPIRATION RATE: 20 BRPM | HEIGHT: 63 IN | DIASTOLIC BLOOD PRESSURE: 88 MMHG

## 2021-07-21 LAB
ALBUMIN SERPL-MCNC: 2.7 G/DL (ref 3.5–5.1)
ALP BLD-CCNC: 55 U/L (ref 38–126)
ALT SERPL-CCNC: 7 U/L (ref 11–66)
ANION GAP SERPL CALCULATED.3IONS-SCNC: 9 MEQ/L (ref 8–16)
AST SERPL-CCNC: 16 U/L (ref 5–40)
BILIRUB SERPL-MCNC: 0.2 MG/DL (ref 0.3–1.2)
BUN BLDV-MCNC: 19 MG/DL (ref 7–22)
CALCIUM SERPL-MCNC: 9.2 MG/DL (ref 8.5–10.5)
CHLORIDE BLD-SCNC: 104 MEQ/L (ref 98–111)
CO2: 27 MEQ/L (ref 23–33)
CREAT SERPL-MCNC: 0.6 MG/DL (ref 0.4–1.2)
ERYTHROCYTE [DISTWIDTH] IN BLOOD BY AUTOMATED COUNT: 15 % (ref 11.5–14.5)
ERYTHROCYTE [DISTWIDTH] IN BLOOD BY AUTOMATED COUNT: 53.9 FL (ref 35–45)
GFR SERPL CREATININE-BSD FRML MDRD: > 90 ML/MIN/1.73M2
GLUCOSE BLD-MCNC: 99 MG/DL (ref 70–108)
HCT VFR BLD CALC: 33.2 % (ref 37–47)
HEMOGLOBIN: 10.4 GM/DL (ref 12–16)
MCH RBC QN AUTO: 30.4 PG (ref 26–33)
MCHC RBC AUTO-ENTMCNC: 31.3 GM/DL (ref 32.2–35.5)
MCV RBC AUTO: 97.1 FL (ref 81–99)
PLATELET # BLD: 156 THOU/MM3 (ref 130–400)
PMV BLD AUTO: 11.5 FL (ref 9.4–12.4)
POTASSIUM SERPL-SCNC: 4.6 MEQ/L (ref 3.5–5.2)
RBC # BLD: 3.42 MILL/MM3 (ref 4.2–5.4)
SARS-COV-2, NAAT: NOT DETECTED
SODIUM BLD-SCNC: 140 MEQ/L (ref 135–145)
TOTAL PROTEIN: 6.7 G/DL (ref 6.1–8)
WBC # BLD: 7.6 THOU/MM3 (ref 4.8–10.8)

## 2021-07-21 PROCEDURE — 6370000000 HC RX 637 (ALT 250 FOR IP): Performed by: FAMILY MEDICINE

## 2021-07-21 PROCEDURE — 99239 HOSP IP/OBS DSCHRG MGMT >30: CPT | Performed by: INTERNAL MEDICINE

## 2021-07-21 PROCEDURE — 80053 COMPREHEN METABOLIC PANEL: CPT

## 2021-07-21 PROCEDURE — 6360000002 HC RX W HCPCS: Performed by: EMERGENCY MEDICINE

## 2021-07-21 PROCEDURE — 2580000003 HC RX 258: Performed by: HOSPITALIST

## 2021-07-21 PROCEDURE — 85027 COMPLETE CBC AUTOMATED: CPT

## 2021-07-21 PROCEDURE — 36415 COLL VENOUS BLD VENIPUNCTURE: CPT

## 2021-07-21 PROCEDURE — 2580000003 HC RX 258: Performed by: EMERGENCY MEDICINE

## 2021-07-21 PROCEDURE — 87635 SARS-COV-2 COVID-19 AMP PRB: CPT

## 2021-07-21 PROCEDURE — 2500000003 HC RX 250 WO HCPCS: Performed by: FAMILY MEDICINE

## 2021-07-21 PROCEDURE — 6370000000 HC RX 637 (ALT 250 FOR IP): Performed by: HOSPITALIST

## 2021-07-21 RX ORDER — CEFDINIR 300 MG/1
300 CAPSULE ORAL 2 TIMES DAILY
Qty: 10 CAPSULE | Refills: 0 | DISCHARGE
Start: 2021-07-21 | End: 2021-07-26

## 2021-07-21 RX ORDER — MIDODRINE HYDROCHLORIDE 5 MG/1
5 TABLET ORAL
Qty: 90 TABLET | Refills: 3 | DISCHARGE
Start: 2021-07-21

## 2021-07-21 RX ORDER — LORAZEPAM 2 MG/ML
0.5 CONCENTRATE ORAL EVERY 8 HOURS
Qty: 3.75 ML | Refills: 0 | Status: SHIPPED | OUTPATIENT
Start: 2021-07-21 | End: 2021-07-26

## 2021-07-21 RX ADMIN — MIDODRINE HYDROCHLORIDE 5 MG: 5 TABLET ORAL at 12:50

## 2021-07-21 RX ADMIN — METRONIDAZOLE 500 MG: 500 INJECTION, SOLUTION INTRAVENOUS at 14:53

## 2021-07-21 RX ADMIN — VALPROIC ACID 1000 MG: 250 SOLUTION ORAL at 14:53

## 2021-07-21 RX ADMIN — METRONIDAZOLE 500 MG: 500 INJECTION, SOLUTION INTRAVENOUS at 06:30

## 2021-07-21 RX ADMIN — ASPIRIN 81 MG: 81 TABLET, CHEWABLE ORAL at 09:42

## 2021-07-21 RX ADMIN — MIDODRINE HYDROCHLORIDE 5 MG: 5 TABLET ORAL at 18:17

## 2021-07-21 RX ADMIN — SODIUM CHLORIDE, PRESERVATIVE FREE 10 ML: 5 INJECTION INTRAVENOUS at 09:43

## 2021-07-21 RX ADMIN — METOPROLOL TARTRATE 12.5 MG: 25 TABLET, FILM COATED ORAL at 09:42

## 2021-07-21 RX ADMIN — MIDODRINE HYDROCHLORIDE 5 MG: 5 TABLET ORAL at 09:41

## 2021-07-21 RX ADMIN — VALPROIC ACID 1000 MG: 250 SOLUTION ORAL at 09:42

## 2021-07-21 RX ADMIN — LORAZEPAM 0.5 MG: 2 SOLUTION, CONCENTRATE ORAL at 04:11

## 2021-07-21 RX ADMIN — ACETAMINOPHEN 650 MG: 325 TABLET ORAL at 14:55

## 2021-07-21 RX ADMIN — ACETAMINOPHEN 650 MG: 325 TABLET ORAL at 09:42

## 2021-07-21 RX ADMIN — LORAZEPAM 0.5 MG: 2 SOLUTION, CONCENTRATE ORAL at 14:52

## 2021-07-21 RX ADMIN — CEFEPIME HYDROCHLORIDE 2000 MG: 2 INJECTION, POWDER, FOR SOLUTION INTRAVENOUS at 04:08

## 2021-07-21 RX ADMIN — CEFEPIME HYDROCHLORIDE 2000 MG: 2 INJECTION, POWDER, FOR SOLUTION INTRAVENOUS at 18:13

## 2021-07-21 RX ADMIN — FAMOTIDINE 20 MG: 20 TABLET, FILM COATED ORAL at 09:42

## 2021-07-21 RX ADMIN — DONEPEZIL HYDROCHLORIDE 10 MG: 10 TABLET, FILM COATED ORAL at 09:42

## 2021-07-21 RX ADMIN — FUROSEMIDE 20 MG: 20 TABLET ORAL at 09:41

## 2021-07-21 ASSESSMENT — PAIN SCALES - GENERAL
PAINLEVEL_OUTOF10: 0

## 2021-07-21 NOTE — CARE COORDINATION
7/21/21, 11:07 AM EDT    DISCHARGE ON [address] day: 3  Location: -11/011-A Reason for admit: HCAP (healthcare-associated pneumonia) [J18.9]   Procedure:   7/18 CT Chest  1. No pulmonary emboli. 2. Consolidation in the left lower lobe consistent with pneumonia. 3. Mild right basilar infiltrate versus atelectasis. 7/20 Gastrogaffin Study for PEG WNL  Barriers to [address] w history dementia, Bipolar, CVA, seizures. Palliative Care following. WBC 13.6, elevated BNP; monitor. IV Maxipime, IV Flagyl continued.  Await final cultures and AB plans    PCP: Brice Byrd DO  Readmission Risk Score: 14%     Patient Goals/Plan/Treatment Preferences: [facility]; client is nonverbal; has nebulizer, PEG TF; monitor AB plans (await final cultures); collaborated MARISEL Coughlin

## 2021-07-21 NOTE — DISCHARGE SUMMARY
Hospital Medicine Discharge Summary      Patient Identification:   Ismael Ward   : 1950  MRN: [id number]   Account: [de-identified]      Patient's PCP: Rachel Woodard DO    Admit Date: 2021     Discharge Date:   2021    Admitting Physician: Regina Francois MD     Discharge Physician: Shamir Pelayo, [address] Course:   Ismael Ward is a 70 y.o. female admitted to [facility] on 2021 for acute hypoxic respiratory failure secondary to HCAP and was brought to the ED with fever and shortness of breath. She has a history of seizure, nonverbal and coma status, Alzheimer's disease, aphasia, tube feeding dependent, CAD, CVA, chronic UTI, hypertension, and hyperlipidema. At the time of presentation the patient was saturating 90% on room air. The patient was started on cefepime, levaquin, and vancomycin with Duonebs as needed. On 21 the patient was switched from cefepime to flagyl due to concerns for aspiration pneumonia. MRSA screen ended up being negative so the vancomycin was stopped. The patient's symptoms ultimately improved and the patient was able to breath on room air and was discharged to [facility] with midodrine 5 mg and cefdinir 300 mg. The patient was stable for discharge - all consultants were contacted and in agreement with plan for discharge. Appropriate follow up appointment was arranged prior to discharge. Please see below or view chart for more details from hospital course.      Discharge Diagnoses:     1. Acute hypoxic respiratory failure secondary to R [address] 53 concerns for aspiration pneumonia, improving: patient now on room air with PEG tube placement from 2021 and history of dysphagia;  chest x-ray showed new bilateral lower lobe airspace disease particularly on the left; CTA showed consolidation in the left lower lobe consistent with pneumonia and mild right basilar infiltrate vs atelectasis on the right; patient was on cefepime, levaquin, and flagyl, duonebs prn  -MRSA swab/screen negative vancomycin stopped   -Legionella antigen negative  -COVID and flu antigens negative  -Strep pneumoniae antigen negative  -UA negative     2. Sepsis (POA) due to CAP, resolving: patient discharged on cefdinir after being on flagyl and cefepime     3. Acute on chronic HPEF, improved: -proBNP elevated at 2355 on admission now downtrending to 775.3  -EF 55 to 87%, grade 1 diastolic heart failure per echocardiogram in 2015  -Echo ordered and showed ejection fraction 60% with grade 1 diastolic dysfunction and no regional left ventricular wall abnormalities; patient on furosemide PO 20 mg daily  -Daily weights, I/Os, fluid and salt restrictions; keep K>4 and Mg>2     4. Elevated troponinX2 ( mildly elevated) possibly secondary to sepsis/pneumonia:  EKG showed sinus tachycardia;; patient on aspirin 81 mg     5.  Hypotension secondary to sepsis, resolved: -started midodrine 5 mg via PEG tube      6.  History of seizures: continue lorazepam     7.  History of Alzheimer's disease-stable: continue home [employer]     8. History of essential hypertension-pressures controlled: patient on lopressor 12.5 mg daily    9. History of hyperlipidemia: not on statin at this time; can possibly follow-up outpatient            The patient was seen and examined on day of discharge and this discharge summary is in conjunction with any daily progress note from day of discharge.         Exam:     Vitals:  Vitals:    07/21/21 1025 07/21/21 1115 07/21/21 1345 07/21/21 1531   BP:  111/69 124/64 124/85   Pulse:  66 70 72   Resp:  17 18 17   Temp:  98.2 °F (36.8 °C)  97.5 °F (36.4 °C)   TempSrc:  Axillary  Axillary   SpO2: 99% 98% 97% 95%   Weight:       Height:         Weight: Weight: 142 lb 10.2 oz (64.7 kg)     24 hour intake/output:    Intake/Output Summary (Last 24 hours) at 7/21/2021 1613  Last data filed at 7/21/2021 1413  Gross per 24 hour   Intake 1549.24 ml   Output 0 ml   Net 1549.24 ml General appearance: No apparent distress, well developed, appears stated age. Eyes:  Pupils equal, round, and reactive to light. Conjunctivae/corneas clear. HENT: Head normal in appearance. External nares normal.  Oral mucosa moist without lesions. Hearing grossly intact. Neck: Supple, with full range of motion. Trachea midline. No gross JVD appreciated. Respiratory:  Patient breathing on room air; mild crackles appreciated on lung bases; no wheezing; +rhonchi; normal respiratory effort  Cardiovascular: Normal rate, regular rhythm with normal S1/S2 without murmurs. No lower extremity edema. Abdomen: Soft, non-tender, non-distended with normal bowel sounds. Musculoskeletal: There is no joint swelling or tenderness. Normal tone. No abnormal movements. Skin: Warm and dry. No rashes or lesions. Neurologic:  Patient nonverbal; aphasic; but is more alert and interactive today  CN nerves:  grossly non-focal 2-12. Psychiatric: Alert and oriented, normal insight and though content. Capillary Refill: Brisk,< 3 seconds. Peripheral Pulses: +2 palpable, equal bilaterally. Labs: For convenience and continuity at follow-up the following most recent labs are provided:      CBC:    Lab Results   Component Value Date    WBC 7.6 07/21/2021    HGB 10.4 07/21/2021    HCT 33.2 07/21/2021     07/21/2021       Renal:    Lab Results   Component Value Date     07/21/2021    K 4.6 07/21/2021    K 3.9 07/19/2021     07/21/2021    CO2 27 07/21/2021    BUN 19 07/21/2021    CREATININE 0.6 07/21/2021    CALCIUM 9.2 07/21/2021         Significant Diagnostic Studies    Radiology:   XR INJ CONTRAST GASTRIC TUBE PERC   Final Result   1. Postcontrast images demonstrate contrast within the gastric lumen. No gross evidence of extravasation from the gastric lumen is seen. The balloon from the gastrostomy tube overlies the gastric body.             **This report has been created using voice recognition medications    cefdinir 300 MG capsule  Commonly known as: OMNICEF  Take 1 capsule by mouth 2 times daily for 5 days Has received ceftriaxone while in the hospital this admission     midodrine 5 MG tablet  Commonly known as: PROAMATINE  1 tablet by PEG Tube route 3 times daily (with meals)        CONTINUE taking these medications    * acetaminophen 500 MG tablet  Commonly known as: TYLENOL     * acetaminophen 325 MG tablet  Commonly known as: TYLENOL     aspirin 81 MG chewable tablet     donepezil 10 MG tablet  Commonly known as: ARICEPT     famotidine 40 MG/5ML suspension  Commonly known as: PEPCID     furosemide 10 MG/ML solution  Commonly known as: LASIX     ipratropium-albuterol 0.5-2.5 (3) MG/3ML Soln nebulizer solution  Commonly known as: DUONEB     LORazepam 2 MG/ML concentrated solution  Commonly known as: ATIVAN  0.25 mLs by Per G Tube route every 8 hours for 5 days. metoprolol tartrate 25 MG tablet  Commonly known as: LOPRESSOR     scopolamine transdermal patch  Commonly known as: TRANSDERM-SCOP     senna 8.6 MG tablet  Commonly known as: SENOKOT     traZODone 100 MG tablet  Commonly known as: DESYREL     valproic acid 250 MG/5ML Soln oral solution  Commonly known as: Contreras Safer         * This list has 2 medication(s) that are the same as other medications prescribed for you. Read the directions carefully, and ask your doctor or other care provider to review them with you.             STOP taking these medications    Biofreeze 4 % Gel  Generic drug: Menthol (Topical Analgesic)     meloxicam 7.5 MG tablet  Commonly known as: MOBIC     MILK OF MAGNESIA PO     therapeutic multivitamin-minerals tablet           Where to Get Your Medications      You can get these medications from any pharmacy    Bring a paper prescription for each of these medications  · LORazepam 2 MG/ML concentrated solution     Information about where to get these medications is not yet available    Ask your nurse or doctor about these medications  · cefdinir 300 MG capsule  · midodrine 5 MG tablet               Time Spent on discharge is roughly 30 minutes in the examination, evaluation, counseling and review of medications and discharge plan. Thank you Ada Arnold DO for the opportunity to be involved in this patient's care.     Signed:    Electronically signed by Hamilton Falk DO on 7/21/2021 at 4:13 PM

## 2021-07-21 NOTE — PROGRESS NOTES
Comprehensive Nutrition Assessment    Type and Reason for Visit:  Reassess    Nutrition Recommendations/Plan:   When pt is discharged, recommend pt go back to [facility] TF regimen  Nutren 2.0 38ml/hr 9344-2848. While  here, recommend continue Two Neftali HN 38ml/hr 6736-1980. If no IVF, continue 150ml free water flush every 4 hrs. Nutrition Assessment:    Pt. Appears to be improving from a nutrition standpiint AEB pt received > 75% needs from TF (7/20-7/21). .  At risk for further nutrition compromise r/t admit with Acute Hypoxic Respiratory Failure Secondary to HCAP, Acute On Chronic HF, Elevated Troponin Secondary to Sepsis and underlying medical condition (Hx: Seizure, TF dependent, Nonverbal, Alzheimer's, Aphasia, CAD, CVA, Bipolar, Depression, Chronic UTI). Nutrition recommendations/interventions as per above  Malnutrition Assessment:  Malnutrition Status: At risk for malnutrition (Comment)    Context:  Acute Illness     Findings of the 6 clinical characteristics of malnutrition:  Energy Intake:  No significant decrease in energy intake  Weight Loss:  No significant weight loss     Body Fat Loss:  No significant body fat loss     Muscle Mass Loss:  No significant muscle mass loss    Fluid Accumulation:  No significant fluid accumulation     Strength:  Not Performed    Estimated Daily Nutrient Needs:  Energy (kcal):  1278-1598kcals (20-25kcals/kgm); Weight Used for Energy Requirements:   (63.9kgm (7/19) no edema bedscale)     Protein (g):  64-77 grams (1-1.2 grams protein/kgm); Weight Used for Protein Requirements:   (63.9kgm (7/19))        Fluid (ml/day):  per MD (HF pt); Nutrition Related Findings:  Pt seen, nonverbal, sleeping. Two Neftali HN infusing 38ml/hr x20 hrs/day [facility] doesn't carry Nutren 2.0 ECF formula) Labs: (7/21) Hemoglobin 10. 4. meds: Lasix, Flagyl, Glycolax, Zofran.  BM x 1 (7./21)      Wounds:  None       Current Nutrition Therapies:    Diet NPO  ADULT TUBE FEEDING; PEG; 2.0 Calorie; Cyclic; 38; 0:22 AM; 8:71 AM; 150; Q 4 hours  Current Tube Feeding (TF) Orders:  · Feeding Route: PEG  · Formula: 2.0 Calorie  · Schedule: Cyclic  ·   · Water Flushes: If no IVF, 150ml free water every 4 hrs TF + water flush yields 1432ml water/24 hrs (22ml/kgm wt of 63.9kgm 7/19)  Water flush per MD/HF pt  · Current TF & Flush Orders Provides: [facility] TF Nutren 2 38ml/hr 6968-5446, flush with 150ml free water every 4 hrs yield 1520kcals, 760ml, 60.8 grams protein, 149 grams CHO, 534ml water from T. TF + water flush yields 1520kcals, 60.8 grams protein, 149 grams CHO, 534ml water from TF (24kcals/kgm, 1 gram protein/kgm wt of 63.9kgm 7/19))  · Goal TF & Flush Orders Provides: [facility] doesn't carry Nutren 2) 38ml/hr 1304-5951 yields 1520kcals, 166 grams CHO, 63 grams protein, 4 grams fiber, 532ml water from TF. Anthropometric Measures:  · Height: 5' 3\" (160 cm)  · Current Body Weight: 142 lb 10.2 oz (64.7 kg)   · Admission Body Weight: 140 lb (63.5 kg) ((7/18))    · Usual Body Weight:  (per EMR: (6/26) 140# bedscale. per ECF: 129# (6/12))     · Ideal Body Weight: 115 lbs;     · BMI: 25.3  ·      · BMI Categories: Normal Weight (BMI 22.0 to 24.9) age over 72       Nutrition Diagnosis:   · Inadequate oral intake related to cognitive or neurological impairment, swallowing difficulty as evidenced by NPO or clear liquid status due to medical condition      Nutrition Interventions:   Food and/or Nutrient Delivery:  Continue NPO, Continue Current Tube Feeding  Nutrition Education/Counseling:  Education not appropriate   Coordination of Nutrition Care:  Continue to monitor while inpatient    Goals:  Pt will receive atleast 75% estimated needs from TF during LOS.        Nutrition Monitoring and Evaluation:   Behavioral-Environmental Outcomes:  None Identified   Food/Nutrient Intake Outcomes:  Enteral Nutrition Intake/Tolerance  Physical Signs/Symptoms Outcomes:  Biochemical Data, Chewing or Swallowing, GI

## 2021-07-21 NOTE — PLAN OF CARE
Problem: Nutrition  Goal: Optimal nutrition therapy  7/21/2021 1551 by Jayshree Ahmadi RD, LD  Outcome: Ongoing   Nutrition Problem #1: Inadequate oral intake  Intervention: Food and/or Nutrient Delivery: Continue NPO, Continue Current Tube Feeding  Nutritional Goals: Pt will receive atleast 75% estimated needs from TF during LOS.

## 2021-07-22 ENCOUNTER — OUTSIDE SERVICES (OUTPATIENT)
Dept: FAMILY MEDICINE CLINIC | Age: 71
End: 2021-07-22

## 2021-07-22 ENCOUNTER — OUTSIDE SERVICES (OUTPATIENT)
Dept: FAMILY MEDICINE CLINIC | Age: 71
End: 2021-07-22
Payer: MEDICARE

## 2021-07-22 VITALS
TEMPERATURE: 98.5 F | HEART RATE: 94 BPM | SYSTOLIC BLOOD PRESSURE: 122 MMHG | RESPIRATION RATE: 16 BRPM | WEIGHT: 136 LBS | OXYGEN SATURATION: 90 % | DIASTOLIC BLOOD PRESSURE: 84 MMHG | BODY MASS INDEX: 24.09 KG/M2

## 2021-07-22 DIAGNOSIS — I95.9 HYPOTENSION, UNSPECIFIED HYPOTENSION TYPE: ICD-10-CM

## 2021-07-22 DIAGNOSIS — R65.20 SEPSIS WITH ACUTE HYPOXIC RESPIRATORY FAILURE, DUE TO UNSPECIFIED ORGANISM, UNSPECIFIED WHETHER SEPTIC SHOCK PRESENT (HCC): Primary | ICD-10-CM

## 2021-07-22 DIAGNOSIS — A41.9 SEPSIS WITH ACUTE HYPOXIC RESPIRATORY FAILURE, DUE TO UNSPECIFIED ORGANISM, UNSPECIFIED WHETHER SEPTIC SHOCK PRESENT (HCC): Primary | ICD-10-CM

## 2021-07-22 DIAGNOSIS — J96.01 SEPSIS WITH ACUTE HYPOXIC RESPIRATORY FAILURE, DUE TO UNSPECIFIED ORGANISM, UNSPECIFIED WHETHER SEPTIC SHOCK PRESENT (HCC): Primary | ICD-10-CM

## 2021-07-22 DIAGNOSIS — E46 PROTEIN-CALORIE MALNUTRITION, UNSPECIFIED SEVERITY (HCC): ICD-10-CM

## 2021-07-22 DIAGNOSIS — G40.909 SEIZURE DISORDER (HCC): ICD-10-CM

## 2021-07-22 DIAGNOSIS — J18.9 PNEUMONIA DUE TO INFECTIOUS ORGANISM, UNSPECIFIED LATERALITY, UNSPECIFIED PART OF LUNG: ICD-10-CM

## 2021-07-22 DIAGNOSIS — Z93.1 S/P PERCUTANEOUS ENDOSCOPIC GASTROSTOMY (PEG) TUBE PLACEMENT (HCC): ICD-10-CM

## 2021-07-22 DIAGNOSIS — I10 ESSENTIAL HYPERTENSION: ICD-10-CM

## 2021-07-22 DIAGNOSIS — D68.9 COAGULATION DEFECT (HCC): ICD-10-CM

## 2021-07-22 DIAGNOSIS — R68.89 EXCESSIVE ORAL SECRETIONS: ICD-10-CM

## 2021-07-22 DIAGNOSIS — J18.9 PNEUMONIA OF LEFT LOWER LOBE DUE TO INFECTIOUS ORGANISM: ICD-10-CM

## 2021-07-22 DIAGNOSIS — F03.91 DEMENTIA WITH BEHAVIORAL DISTURBANCE, UNSPECIFIED DEMENTIA TYPE: ICD-10-CM

## 2021-07-22 PROCEDURE — 99308 SBSQ NF CARE LOW MDM 20: CPT | Performed by: NURSE PRACTITIONER

## 2021-07-22 NOTE — PROGRESS NOTES
[facility] Progress Note    NAME: Meggan Coy  DATE: 21  ROOM #:   : 1950  REASON FOR VISIT: No chief complaint on file. CODE STATUS: DNR-CCA    History obtained from chart review and unobtainable from patient due to mental status. SUBJECTIVE:  HPI: Meggan Coy is a 70 y.o. female. Pt seen and examined at bedside. Patient recently discharged from [address] [place] [address] on 2021 for Pneumonia with hypoxic respiratory failure. X-ray showed bilateral lower lobe airspace disease worse on the left. CTA showed consolidation in the left lower lobe consistent with pneumonia. Patient was started on Flagyl and cefepime for concern of possible aspiration pneumonia. Antibiotics were deescalated to cefdinir at discharge. Cultures negative x2 in molecular pneumonia panel is still in process at this time. Patient's respiratory status improved while in the hospital and patient was weaned off of oxygen. Patient discharged back to [facility] in stable condition at her baseline. On the hospital patient had issues with possible heart failure and underwent echocardiogram which showed an ejection fraction of 60% with grade 1 diastolic dysfunction. Instructed continue her Lasix 20 mg daily. She also suffered an elevated troponin x2 which was attributed to possible sepsis from the pneumonia. Patient had hypotension while hospitalized and was started on midodrine 5 mg daily via the PEG tube. Today patient appears at her baseline, she is nonverbal secondary to her progressive dementia. Patient having occasional secretions however is managing her airway appropriately. Oxygen saturations above 90%. Respirations were at a normal rate and unlabored. Blood pressure stable and patient was afebrile. I have reviewed patients past medical, surgical, social, and family history and have made updates where appropriate.  Allergies and Medications were reviewed through the [facility] EMR.      Patient Active Problem List    Diagnosis Date Noted    Sepsis due to pneumonia ([facility] 07/18/2021    Coagulation defect ([facility] 06/28/2021    Schizophrenia ([facility] 01/21/2021    Late onset Alzheimer's disease with behavioral disturbance ([facility] 04/30/2019    Seizure disorder ([facility] 04/30/2019    Essential hypertension     Elevated troponin level 09/01/2015    Abnormal ECG 09/01/2015         Review of Systems  Unable to assess secondary to dementia    PHYSICAL EXAM:  Vitals:    07/22/21 0932   BP: 122/84   Pulse: 94   Resp: 16   Temp: 98.5 °F (36.9 °C)   SpO2: 90%   Weight: 136 lb (61.7 kg)     Body mass index is 24.09 kg/m². VS Reviewed  General Appearance: Debilitated female in no acute distress  Head: normocephalic and atraumatic  Eyes: conjunctivae and eye lids without erythema  Neck: supple and non-tender without mass, no thyromegaly or thyroid nodules, no cervical lymphadenopathy  Pulmonary/Chest: Transmitted upper airway sounds secondary to increased oral secretions. Mild crackles in the left lower lobe. Cardiovascular: normal rate, regular rhythm, normal S1 and S2, no murmurs, rubs, clicks, or gallops, distal pulses intact  Abdomen: Soft, nontender G-tube present on the upper abdomen with no erythema or swelling noted. G-tube with no obvious obstruction. Extremities: no cyanosis, clubbing or edema of the lower extremities  Musculoskeletal: No joint swelling or gross deformity   Neuro: Nonverbal, unable to follow commands secondary to dementia  Psych: Unable to assess secondary to dementia  Skin: warm and dry, no rash or erythema      ASSESSMENT & PLAN  1. Sepsis with acute hypoxic respiratory failure, due to unspecified organism, unspecified whether septic shock present ([facility]  -Resolved, continue cefdinir for another 5 days.  -Continue to monitor for signs of decompensation including return of fever, decreased oxygen saturation, worsening shortness of breath.     2. Pneumonia of left lower lobe due to infectious organism  -Continue cefdinir as above.  -Clinically improving at this time. -Monitor for signs of decompensation. 3. Dementia with behavioral disturbance, unspecified dementia type ([facility]  -DNR CCA  -Continue lorazepam for behavioral issues  -Consider stopping Aricept at this time.  -Continue trazodone for sleep    4. Excessive oral secretions  -Continue scopolamine patch for excessive oral secretions. 5. Essential hypertension  -BP stable today.  -Continue furosemide 20 mg daily.  -Continue metoprolol 12.5 mg daily    6. Hypotension, unspecified hypotension type  -Unspecified hypotension however consider may be secondary to sepsis from the pneumonia.  -Continue midodrine at this time as blood pressure is stable however may consider stopping midodrine should her blood pressure become elevated. 7. Seizure disorder (HCC)  Continue valproic acid and lorazepam.      8. S/P percutaneous endoscopic gastrostomy (PEG) tube placement ([facility]  -G-tube present continue all meds and feeding through G-tube. -Continue n.p.o.  -Nutren 2.0 at 38 mL/h for 20 hours daily  -Continue routine G-tube care    9. Protein-calorie malnutrition, unspecified severity ([facility]  -Tube feedings as above continue routine G-tube care. 10. HFpEF  -Grade 1 diastolic dysfunction noted on echocardiogram.  BNP on last admission.  -Continue furosemide daily.  -Weight on the increased today, fluid status stable. Continue to monitor daily weights.     Electronically signed by Deirdre Walker DO on 7/22/2021 at 9:43 AM

## 2021-07-22 NOTE — PROGRESS NOTES
2005- Patient discharged via [facility] to [facility] in stable condition at her baseline. 2032- Report called to [facility] nurse. All questions answered at this time.

## 2021-07-22 NOTE — PROGRESS NOTES
S: 70 y.o. female with   Chief Complaint   Patient presents with   [facility] follow up. Patient unable to give any history. Returned last night, hx of hypoxic resp failure. Stable since return to the facility. Continue current medications. BP Readings from Last 3 Encounters:   07/22/21 122/84   07/21/21 127/88   06/26/21 (!) 130/95     Wt Readings from Last 3 Encounters:   07/22/21 136 lb (61.7 kg)   07/21/21 142 lb 10.2 oz (64.7 kg)   06/26/21 140 lb (63.5 kg)           O: VS:  weight is 136 lb (61.7 kg). Her temperature is 98.5 °F (36.9 °C). Her blood pressure is 122/84 and her pulse is 94. Her respiration is 16 and oxygen saturation is 90%. Physical Exam       Diagnosis Orders   1. Sepsis with acute hypoxic respiratory failure, due to unspecified organism, unspecified whether septic shock present ([address]     2. Pneumonia of left lower lobe due to infectious organism     3. Dementia with behavioral disturbance, unspecified dementia type ([address]     4. Excessive oral secretions     5. Essential hypertension     6. Hypotension, unspecified hypotension type     7. Seizure disorder ([address]     8. Coagulation defect ([address]     9. S/P percutaneous endoscopic gastrostomy (PEG) tube placement ([address]     10. Protein-calorie malnutrition, unspecified severity ([address]         Plan    Continue medications/treatments as stated in Dr Lisa Pantoja note.       Health Maintenance Due   Topic Date Due    Hepatitis C screen  Never done    COVID-19 Vaccine (1) Never done    DTaP/Tdap/Td vaccine (1 - Tdap) Never done    Lipid screen  Never done    Colon cancer screen colonoscopy  Never done    Breast cancer screen  Never done    Shingles Vaccine (1 of 2) Never done    DEXA (modify frequency per FRAX score)  Never done    Pneumococcal 65+ years Vaccine (1 of 1 - PPSV23) Never done   Blase Liming Annual Wellness Visit (AWV)  Never done         Attending Physician Statement  I have discussed the case, including pertinent history and exam findings with the resident. I agree with the documented assessment and plan as documented by the resident.   GE modifier added to this encounter      Paula Akhtar DO 7/22/2021 9:56 AM

## 2021-07-23 LAB
BLOOD CULTURE, ROUTINE: NORMAL
BLOOD CULTURE, ROUTINE: NORMAL

## 2021-07-23 NOTE — PROGRESS NOTES
NAME: Marykay Bence  DATE: 21  ROOM #: 57-1  CODE STATUS:  DNR-CCA  REASON FOR VISIT: Acute follow up to hospitalization  : [birth year]    HPI:  Elderly patient up in her lala chair with eyes closed. Patient has returned from the hospital after being treated for pneumonia. She is at baseline mentation without eye opening or communication. She has no appearance of distress or pain. Nursing does not offer new concerns. PMHx: Dementia, HLD, Chronic pain, Seizure disorder, Insomnia, recurrent UTIs   PSHx: Tubal ligation, Tonsillectomy   Social Hx: No tobacco or EtOH   FamHx: unknown     PHYSICAL EXAM   Vital Signs:  T, BP, P, RR, Wt  98, 122/84, 94, 18, 136#  General Appearance: debilitated appearing female in no acute distress   Head: normocephalic and atraumatic   ENT: external ear canal normal. No lip or gum lesions noted    Cardiovascular: Distal pulses present. S1, S2. Regular rate and rhythm. Pulmonary/Chest:No respiratory distress or retractions. Scattered rhonchi anterior left. Abdomen:  PEG present. ABS x 4. Musculoskeletal: No obvious joint swelling or gross deformity   Neuro/Psych: Awake, non verbal. Calm. ASSESSMENT AND PLAN   1. Pneumonia: Treated in hospital and returned to facility this week on Cefdinir. She remains afebrile, at her usual debilitated baseline and in NAD. Continue with current POC. She will be see by Dr Danielle Garner for completion of H&P this week.      Plan of care reviewed with Dr Ness Haji, CNP

## 2021-08-04 ENCOUNTER — OUTSIDE SERVICES (OUTPATIENT)
Dept: FAMILY MEDICINE CLINIC | Age: 71
End: 2021-08-04
Payer: MEDICARE

## 2021-08-04 DIAGNOSIS — R05.9 COUGH: ICD-10-CM

## 2021-08-04 PROCEDURE — 99308 SBSQ NF CARE LOW MDM 20: CPT | Performed by: NURSE PRACTITIONER

## 2021-08-10 ENCOUNTER — HOSPITAL ENCOUNTER (EMERGENCY)
Age: 71
Discharge: HOME | End: 2021-08-10
Attending: EMERGENCY MEDICINE
Payer: MEDICARE

## 2021-08-10 ENCOUNTER — APPOINTMENT (OUTPATIENT)
Dept: CT IMAGING | Age: 71
End: 2021-08-10
Payer: MEDICARE

## 2021-08-10 VITALS
SYSTOLIC BLOOD PRESSURE: 117 MMHG | HEART RATE: 98 BPM | DIASTOLIC BLOOD PRESSURE: 85 MMHG | TEMPERATURE: 98 F | WEIGHT: 140 LBS | BODY MASS INDEX: 24.8 KG/M2 | OXYGEN SATURATION: 97 % | RESPIRATION RATE: 19 BRPM

## 2021-08-10 DIAGNOSIS — J96.01 ACUTE RESPIRATORY FAILURE WITH HYPOXIA (HCC): ICD-10-CM

## 2021-08-10 DIAGNOSIS — J69.0 ASPIRATION PNEUMONIA, UNSPECIFIED ASPIRATION PNEUMONIA TYPE, UNSPECIFIED LATERALITY, UNSPECIFIED PART OF LUNG (HCC): ICD-10-CM

## 2021-08-10 DIAGNOSIS — J18.9 COMMUNITY ACQUIRED PNEUMONIA, UNSPECIFIED LATERALITY: Primary | ICD-10-CM

## 2021-08-10 LAB
ALBUMIN SERPL-MCNC: 3.8 G/DL (ref 3.5–5.1)
ALLEN TEST: POSITIVE
ALP BLD-CCNC: 64 U/L (ref 38–126)
ALT SERPL-CCNC: < 5 U/L (ref 11–66)
ANION GAP SERPL CALCULATED.3IONS-SCNC: 12 MEQ/L (ref 8–16)
AST SERPL-CCNC: 16 U/L (ref 5–40)
BACTERIA: ABNORMAL /HPF
BASE EXCESS (CALCULATED): 2.3 MMOL/L (ref -2.5–2.5)
BASOPHILS # BLD: 0.3 %
BASOPHILS ABSOLUTE: 0.1 THOU/MM3 (ref 0–0.1)
BILIRUB SERPL-MCNC: 0.4 MG/DL (ref 0.3–1.2)
BILIRUBIN URINE: NEGATIVE
BLOOD, URINE: NEGATIVE
BUN BLDV-MCNC: 23 MG/DL (ref 7–22)
CALCIUM SERPL-MCNC: 9.8 MG/DL (ref 8.5–10.5)
CASTS 2: ABNORMAL /LPF
CASTS UA: ABNORMAL /LPF
CHARACTER, URINE: CLEAR
CHLORIDE BLD-SCNC: 101 MEQ/L (ref 98–111)
CO2: 28 MEQ/L (ref 23–33)
COLLECTED BY:: NORMAL
COLOR: ABNORMAL
CREAT SERPL-MCNC: 0.8 MG/DL (ref 0.4–1.2)
CRYSTALS, UA: ABNORMAL
DEVICE: NORMAL
EOSINOPHIL # BLD: 0 %
EOSINOPHILS ABSOLUTE: 0 THOU/MM3 (ref 0–0.4)
EPITHELIAL CELLS, UA: ABNORMAL /HPF
ERYTHROCYTE [DISTWIDTH] IN BLOOD BY AUTOMATED COUNT: 16.6 % (ref 11.5–14.5)
ERYTHROCYTE [DISTWIDTH] IN BLOOD BY AUTOMATED COUNT: 59.6 FL (ref 35–45)
GFR SERPL CREATININE-BSD FRML MDRD: 85 ML/MIN/1.73M2
GLUCOSE BLD-MCNC: 135 MG/DL (ref 70–108)
GLUCOSE URINE: NEGATIVE MG/DL
HCO3: 28 MMOL/L (ref 23–28)
HCT VFR BLD CALC: 40.6 % (ref 37–47)
HEMOGLOBIN: 12.6 GM/DL (ref 12–16)
IFIO2: 3
IMMATURE GRANS (ABS): 0.07 THOU/MM3 (ref 0–0.07)
IMMATURE GRANULOCYTES: 0.4 %
KETONES, URINE: ABNORMAL
LACTIC ACID, SEPSIS: 1.3 MMOL/L (ref 0.5–1.9)
LEUKOCYTE ESTERASE, URINE: ABNORMAL
LYMPHOCYTES # BLD: 6.8 %
LYMPHOCYTES ABSOLUTE: 1.2 THOU/MM3 (ref 1–4.8)
MCH RBC QN AUTO: 30.4 PG (ref 26–33)
MCHC RBC AUTO-ENTMCNC: 31 GM/DL (ref 32.2–35.5)
MCV RBC AUTO: 97.8 FL (ref 81–99)
MISCELLANEOUS 2: ABNORMAL
MONOCYTES # BLD: 11.1 %
MONOCYTES ABSOLUTE: 1.9 THOU/MM3 (ref 0.4–1.3)
NITRITE, URINE: NEGATIVE
NUCLEATED RED BLOOD CELLS: 0 /100 WBC
O2 SATURATION: 97 %
OSMOLALITY CALCULATION: 287 MOSMOL/KG (ref 275–300)
PCO2: 45 MMHG (ref 35–45)
PH BLOOD GAS: 7.4 (ref 7.35–7.45)
PH UA: 8.5 (ref 5–9)
PLATELET # BLD: 261 THOU/MM3 (ref 130–400)
PMV BLD AUTO: 10.2 FL (ref 9.4–12.4)
PO2: 88 MMHG (ref 71–104)
POTASSIUM SERPL-SCNC: 4.8 MEQ/L (ref 3.5–5.2)
PRO-BNP: 1242 PG/ML (ref 0–900)
PROCALCITONIN: 0.11 NG/ML (ref 0.01–0.09)
PROTEIN UA: 30
RBC # BLD: 4.15 MILL/MM3 (ref 4.2–5.4)
RBC URINE: ABNORMAL /HPF
RENAL EPITHELIAL, UA: ABNORMAL
SEG NEUTROPHILS: 81.4 %
SEGMENTED NEUTROPHILS ABSOLUTE COUNT: 14.1 THOU/MM3 (ref 1.8–7.7)
SODIUM BLD-SCNC: 141 MEQ/L (ref 135–145)
SOURCE, BLOOD GAS: NORMAL
SPECIFIC GRAVITY, URINE: 1.02 (ref 1–1.03)
TOTAL PROTEIN: 7.8 G/DL (ref 6.1–8)
TROPONIN T: < 0.01 NG/ML
UROBILINOGEN, URINE: 1 EU/DL (ref 0–1)
WBC # BLD: 17.3 THOU/MM3 (ref 4.8–10.8)
WBC UA: ABNORMAL /HPF
YEAST: ABNORMAL

## 2021-08-10 PROCEDURE — 81001 URINALYSIS AUTO W/SCOPE: CPT

## 2021-08-10 PROCEDURE — 83605 ASSAY OF LACTIC ACID: CPT

## 2021-08-10 PROCEDURE — 96368 THER/DIAG CONCURRENT INF: CPT

## 2021-08-10 PROCEDURE — 84484 ASSAY OF TROPONIN QUANT: CPT

## 2021-08-10 PROCEDURE — 93005 ELECTROCARDIOGRAM TRACING: CPT | Performed by: EMERGENCY MEDICINE

## 2021-08-10 PROCEDURE — 2580000003 HC RX 258: Performed by: EMERGENCY MEDICINE

## 2021-08-10 PROCEDURE — 85025 COMPLETE CBC W/AUTO DIFF WBC: CPT

## 2021-08-10 PROCEDURE — 6370000000 HC RX 637 (ALT 250 FOR IP): Performed by: EMERGENCY MEDICINE

## 2021-08-10 PROCEDURE — 6360000004 HC RX CONTRAST MEDICATION: Performed by: EMERGENCY MEDICINE

## 2021-08-10 PROCEDURE — 94761 N-INVAS EAR/PLS OXIMETRY MLT: CPT

## 2021-08-10 PROCEDURE — 80053 COMPREHEN METABOLIC PANEL: CPT

## 2021-08-10 PROCEDURE — 83880 ASSAY OF NATRIURETIC PEPTIDE: CPT

## 2021-08-10 PROCEDURE — 96366 THER/PROPH/DIAG IV INF ADDON: CPT

## 2021-08-10 PROCEDURE — 99284 EMERGENCY DEPT VISIT MOD MDM: CPT

## 2021-08-10 PROCEDURE — 6360000002 HC RX W HCPCS: Performed by: EMERGENCY MEDICINE

## 2021-08-10 PROCEDURE — 96365 THER/PROPH/DIAG IV INF INIT: CPT

## 2021-08-10 PROCEDURE — 2700000000 HC OXYGEN THERAPY PER DAY

## 2021-08-10 PROCEDURE — 71275 CT ANGIOGRAPHY CHEST: CPT

## 2021-08-10 PROCEDURE — 82803 BLOOD GASES ANY COMBINATION: CPT

## 2021-08-10 PROCEDURE — 36415 COLL VENOUS BLD VENIPUNCTURE: CPT

## 2021-08-10 PROCEDURE — 2500000003 HC RX 250 WO HCPCS: Performed by: EMERGENCY MEDICINE

## 2021-08-10 PROCEDURE — 36600 WITHDRAWAL OF ARTERIAL BLOOD: CPT

## 2021-08-10 PROCEDURE — 94640 AIRWAY INHALATION TREATMENT: CPT

## 2021-08-10 PROCEDURE — 87040 BLOOD CULTURE FOR BACTERIA: CPT

## 2021-08-10 PROCEDURE — 84145 PROCALCITONIN (PCT): CPT

## 2021-08-10 RX ORDER — LEVOFLOXACIN 750 MG/1
750 TABLET ORAL DAILY
Qty: 7 TABLET | Refills: 0 | Status: SHIPPED | OUTPATIENT
Start: 2021-08-10 | End: 2021-08-17

## 2021-08-10 RX ORDER — IPRATROPIUM BROMIDE AND ALBUTEROL SULFATE 2.5; .5 MG/3ML; MG/3ML
1 SOLUTION RESPIRATORY (INHALATION) ONCE
Status: COMPLETED | OUTPATIENT
Start: 2021-08-10 | End: 2021-08-10

## 2021-08-10 RX ORDER — 0.9 % SODIUM CHLORIDE 0.9 %
1000 INTRAVENOUS SOLUTION INTRAVENOUS ONCE
Status: COMPLETED | OUTPATIENT
Start: 2021-08-10 | End: 2021-08-10

## 2021-08-10 RX ORDER — LEVOFLOXACIN 5 MG/ML
500 INJECTION, SOLUTION INTRAVENOUS ONCE
Status: COMPLETED | OUTPATIENT
Start: 2021-08-10 | End: 2021-08-10

## 2021-08-10 RX ADMIN — SODIUM CHLORIDE 1000 ML: 9 INJECTION, SOLUTION INTRAVENOUS at 19:56

## 2021-08-10 RX ADMIN — IPRATROPIUM BROMIDE AND ALBUTEROL SULFATE 1 AMPULE: .5; 3 SOLUTION RESPIRATORY (INHALATION) at 20:41

## 2021-08-10 RX ADMIN — METRONIDAZOLE 500 MG: 500 INJECTION, SOLUTION INTRAVENOUS at 21:25

## 2021-08-10 RX ADMIN — LEVOFLOXACIN 500 MG: 5 INJECTION, SOLUTION INTRAVENOUS at 19:56

## 2021-08-10 RX ADMIN — CEFEPIME HYDROCHLORIDE 2000 MG: 2 INJECTION, POWDER, FOR SOLUTION INTRAVENOUS at 21:25

## 2021-08-10 RX ADMIN — IOPAMIDOL 80 ML: 755 INJECTION, SOLUTION INTRAVENOUS at 18:58

## 2021-08-10 NOTE — ED PROVIDER NOTES
[address] ENCOUNTER      PATIENT NAME: Kalyn Brannon  MRN: [id number]  : 1950  ONEAL: 8/10/2021  PROVIDER: MD Mundo Adames Dears       Chief Complaint   Patient presents with    Aspiration     was suctioned and then sent to ED       Patient is seen and evaluated in a timely fashion. Nurses Notes are reviewed and I agree except as noted in the HPI. HISTORY OF PRESENT ILLNESS    Kalyn Brannon is a 70 y.o. female who presents to Emergency Department with Aspiration (was suctioned and then sent to ED)     Patient was brought in from SNF by EMS because of hypoxia and possible aspiration. Patient has history of seizure, nonverbal and coma status, Alzheimer's disease, aphasia, tube feeding dependent, CAD, CVA, chronic UTI, hypertension, and hyperlipidema. This morning, nursing home staff noticed the patient was gurgling during tube feeding and she was found febrile. She is not on home O2. Patient then was transferred to ED. SPO2 was 88% on room air. No active vomiting. No skin rashes. No skin ulcers. Admitted from -2021 with discharge diagnosis of acute hypoxic respiratory failure secondary to CAP with concern for aspiration pneumonia, sepsis due to CAP, acute on chronic HPEF, and elevated troponin.      CODE STATUS DNR CCA. POA states no intubation, no compression, no defibrillation, okay for central line. This HPI was provided by EMR review and SNF report.      REVIEW OF SYSTEMS   Review of Systems   Unable to perform ROS: Patient nonverbal        PAST MEDICAL HISTORY     Past Medical History:   Diagnosis Date    Alzheimer disease ([address]     Anxiety     Aphasia     Bipolar 2 disorder ([address]     CAD (coronary artery disease)     Cerebral artery occlusion with cerebral infarction ([address]     Chronic pain     Chronic UTI     Contracture of joint of shoulder region, left     Dementia ([address]     Depression     Dysphagia     GERD (gastroesophageal reflux disease)     Hyperlipidemia     Hypertension     Psychiatric problem     Seizures ([facility]        SURGICAL HISTORY       Past Surgical History:   Procedure Laterality Date    GASTROSTOMY TUBE PLACEMENT N/A 2021    EGD PEG TUBE PLACEMENT performed by Wendi Phelps MD at [address]       Previous Medications    ACETAMINOPHEN (TYLENOL) 325 MG TABLET    650 mg by Per G Tube route every 4 hours as needed for Pain or Fever     ACETAMINOPHEN (TYLENOL) 500 MG TABLET    1,000 mg by Per G Tube route 3 times daily    ASPIRIN 81 MG CHEWABLE TABLET    81 mg by Per G Tube route daily    DONEPEZIL (ARICEPT) 10 MG TABLET    10 mg by Per G Tube route nightly     FAMOTIDINE (PEPCID) 40 MG/5ML SUSPENSION    20 mg by Per G Tube route daily     FUROSEMIDE (LASIX) 10 MG/ML SOLUTION    20 mg by Per G Tube route daily     IPRATROPIUM-ALBUTEROL (DUONEB) 0.5-2.5 (3) MG/3ML SOLN NEBULIZER SOLUTION    Inhale 1 vial into the lungs every 4 hours as needed for Shortness of Breath    METOPROLOL (LOPRESSOR) 25 MG TABLET    12.5 mg by Per G Tube route daily Hold if SBP < 110 or HR < 55    MIDODRINE (PROAMATINE) 5 MG TABLET    1 tablet by PEG Tube route 3 times daily (with meals)    SCOPOLAMINE (TRANSDERM-SCOP) TRANSDERMAL PATCH    Place 1 patch onto the skin every 72 hours    SENNA (SENOKOT) 8.6 MG TABLET    1 tablet by Per G Tube route daily     TRAZODONE (DESYREL) 100 MG TABLET    100 mg by Per G Tube route nightly     VALPROIC ACID (DEPACON) 250 MG/5ML SOLN ORAL SOLUTION    1,000 mg by Per G Tube route every 8 hours        ALLERGIES     Pcn [penicillins]    FAMILY HISTORY     She indicated that her mother is . She indicated that her sister is alive. family history includes Other in her mother. SOCIAL HISTORY      reports that she has quit smoking. Her smoking use included cigarettes.  She has never used smokeless tobacco. She reports current drug use. Drug: Marijuana. She reports that she does not drink alcohol. PHYSICAL EXAM      weight is 140 lb (63.5 kg). Her oral temperature is 98 °F (36.7 °C). Her blood pressure is 115/75 and her pulse is 85. Her respiration is 18 and oxygen saturation is 97%. Physical Exam  Constitutional:       Appearance: She is ill-appearing. Comments: Coma status   HENT:      Head: Normocephalic and atraumatic. Nose: Nose normal.      Mouth/Throat:      Mouth: Mucous membranes are dry. Cardiovascular:      Rate and Rhythm: Normal rate and regular rhythm. Pulmonary:      Comments: Crackles and rhonchi from both lungs  Abdominal:      General: There is no distension. Palpations: There is no mass. Tenderness: There is no abdominal tenderness. Musculoskeletal:         General: No signs of injury. Cervical back: Normal range of motion. No rigidity or tenderness. Skin:     General: Skin is warm. Capillary Refill: Capillary refill takes less than 2 seconds. Neurological:      Mental Status: Mental status is at baseline. ANCILLARY TEST RESULTS   EKG: Interpreted by me  Normal sinus rhythm  Normal MN and QRS  Normal QT, no ST elevation or QT wave  Stable EKG compared to old EKG from 7/18/2021.       LAB RESULTS:  Results for orders placed or performed during the hospital encounter of 08/10/21   CBC Auto Differential   Result Value Ref Range    WBC 17.3 (H) 4.8 - 10.8 thou/mm3    RBC 4.15 (L) 4.20 - 5.40 mill/mm3    Hemoglobin 12.6 12.0 - 16.0 gm/dl    Hematocrit 40.6 37.0 - 47.0 %    MCV 97.8 81.0 - 99.0 fL    MCH 30.4 26.0 - 33.0 pg    MCHC 31.0 (L) 32.2 - 35.5 gm/dl    RDW-CV 16.6 (H) 11.5 - 14.5 %    RDW-SD 59.6 (H) 35.0 - 45.0 fL    Platelets 090 422 - 535 thou/mm3    MPV 10.2 9.4 - 12.4 fL    Seg Neutrophils 81.4 %    Lymphocytes 6.8 %    Monocytes 11.1 %    Eosinophils 0.0 %    Basophils 0.3 %    Immature Granulocytes 0.4 %    Segs Absolute 14.1 (H) 1 - 7 thou/mm3    Lymphocytes Absolute 1.2 1.0 - 4.8 thou/mm3    Monocytes Absolute 1.9 (H) 0.4 - 1.3 thou/mm3    Eosinophils Absolute 0.0 0.0 - 0.4 thou/mm3    Basophils Absolute 0.1 0.0 - 0.1 thou/mm3    Immature Grans (Abs) 0.07 0.00 - 0.07 thou/mm3    nRBC 0 /100 wbc   Comprehensive Metabolic Panel   Result Value Ref Range    Glucose 135 (H) 70 - 108 mg/dL    CREATININE 0.8 0.4 - 1.2 mg/dL    BUN 23 (H) 7 - 22 mg/dL    Sodium 141 135 - 145 meq/L    Potassium 4.8 3.5 - 5.2 meq/L    Chloride 101 98 - 111 meq/L    CO2 28 23 - 33 meq/L    Calcium 9.8 8.5 - 10.5 mg/dL    AST 16 5 - 40 U/L    Alkaline Phosphatase 64 38 - 126 U/L    Total Protein 7.8 6.1 - 8.0 g/dL    Albumin 3.8 3.5 - 5.1 g/dL    Total Bilirubin 0.4 0.3 - 1.2 mg/dL    ALT <5 (L) 11 - 66 U/L   Troponin   Result Value Ref Range    Troponin T < 0.010 ng/ml   Brain Natriuretic Peptide   Result Value Ref Range    Pro-BNP 1242.0 (H) 0.0 - 900.0 pg/mL   Anion Gap   Result Value Ref Range    Anion Gap 12.0 8.0 - 16.0 meq/L   Glomerular Filtration Rate, Estimated   Result Value Ref Range    Est, Glom Filt Rate 85 (A) ml/min/1.73m2   Osmolality   Result Value Ref Range    Osmolality Calc 287.0 275.0 - 300.0 mOsmol/kg   Urine with Reflexed Micro   Result Value Ref Range    Glucose, Ur NEGATIVE NEGATIVE mg/dl    Bilirubin Urine NEGATIVE NEGATIVE    Ketones, Urine TRACE (A) NEGATIVE    Specific Gravity, Urine 1.025 1.002 - 1.030    Blood, Urine NEGATIVE NEGATIVE    pH, UA 8.5 5.0 - 9.0    Protein, UA 30 (A) NEGATIVE    Urobilinogen, Urine 1.0 0.0 - 1.0 eu/dl    Nitrite, Urine NEGATIVE NEGATIVE    Leukocyte Esterase, Urine TRACE (A) NEGATIVE    Color, UA DK YELLOW (A) STRAW-YELLOW    Character, Urine CLEAR CLEAR-SL CLOUD    RBC, UA 3-5 0-2/hpf /hpf    WBC, UA 0-2 0-4/hpf /hpf    Epithelial Cells, UA 5-10 3-5/hpf /hpf    Bacteria, UA NONE SEEN FEW/NONE SEEN /hpf    Casts UA >15 HYALINE NONE SEEN /lpf    Crystals, UA NONE SEEN NONE SEEN    Renal Epithelial, UA NONE SEEN NONE SEEN    Yeast, UA NONE SEEN NONE SEEN    CASTS 2 NONE SEEN NONE SEEN /lpf    MISCELLANEOUS 2 NONE SEEN        RADIOLOGY REPORTS  CTA CHEST W WO CONTRAST   Final Result   1. No pulmonary emboli are seen. Fibroemphysematous lungs. 2. Moderate bibasilar atelectasis/pneumonia with mild involvement of the lingula. 3. Gastrostomy tube. **This report has been created using voice recognition software. It may contain minor errors which are inherent in voice recognition technology. **          Final report electronically signed by Dr. Rodger Caballero on 8/10/2021 7:20 PM          MEDICAL [facility] ED COURSE     ED Vitals:  Vitals:    08/10/21 1700 08/10/21 1719 08/10/21 1827 08/10/21 1929   BP:  131/82 (!) 145/79 133/81   Pulse:  91 89 83   Resp:  21 20 18   Temp:  98 °F (36.7 °C)     TempSrc:  Oral     SpO2: 93% 97% 98% 97%   Weight:   140 lb (63.5 kg)        Differential diagnsis: CAP, aspiration pneumonia, PE, CHF, dehydration, metabolic encephalopathy    Actions: Large-bore IV, labs, CTA chest    Medications   cefepime (MAXIPIME) 2000 mg IVPB minibag (has no administration in time range)   metronidazole (FLAGYL) 500 mg in NaCl 100 mL IVPB premix (has no administration in time range)   levoFLOXacin (LEVAQUIN) 500 MG/100ML infusion 500 mg (has no administration in time range)   0.9 % sodium chloride bolus (1,000 mLs Intravenous New Bag 8/10/21 1956)   ipratropium-albuterol (DUONEB) nebulizer solution 1 ampule (has no administration in time range)   iopamidol (ISOVUE-370) 76 % injection 80 mL (80 mLs Intravenous Given 8/10/21 1858)     WBC 17.3, otherwise labs are reassuring. CTA chest negative for PE, patient has a bilateral consolidations. Aspiration is possible for patient due to tube feeding. Patient has leukocytosis, this could be CAP versus chemical pneumonia. I initiate cefepime, Levaquin, and Flagyl. Discussed with hospitalist (Dr. Kiki Hsu) for admission.   Hospitalist feels patient can be discharged back to SNF with close watch. Should she becomes septic, having fever, or developing ARDS, she can come back for admission. I still feel appropriate treating her pneumonia as CAP given her new hypoxia and leukocytosis. Discharged back to SNF with Levaquin prescribed. PCP follow up in three days. CRITICAL CARE   None    CONSULTS   Dr. Ronny Jimenez    FINAL IMPRESSION AND DISPOSITION      1. Community acquired pneumonia, unspecified laterality    2. Aspiration pneumonia, unspecified aspiration pneumonia type, unspecified laterality, unspecified part of lung ([facility]    3. Acute respiratory failure with hypoxia (HCC)        Back to SNF.      PATIENT REFERRED TO:  DO Luana Quevedo 5  [phone]    In 3 days  ED discharge follow up      [address]:  New Prescriptions    LEVOFLOXACIN (LEVAQUIN) 750 MG TABLET    Take 1 tablet by mouth daily for 7 days       (Please note that portions of this note were completed with a voice recognition program.  Efforts were made to edit the dictations but occasionally words aremis-transcribed.)    MD Papa Tyson MD  08/10/21 6400

## 2021-08-10 NOTE — ED NOTES
Patient resting in bed. Respirations easy and unlabored. No distress noted. Call light within reach.        Juana Mayberry RN  08/10/21 1930

## 2021-08-10 NOTE — ED NOTES
Bed: 022A  Expected date:   Expected time:   Means of arrival: [facility]  Comments:     Awa Hernández RN  08/10/21 6754

## 2021-08-10 NOTE — ED NOTES
Bedside report taken from [facility], This nurse assuming care at this time. Patient resting in bed. Respirations easy and unlabored. No distress noted. Call light within reach. Oxygen increased to 4L at this time.         Gregoria Lester RN  08/10/21 1030

## 2021-08-10 NOTE — ED TRIAGE NOTES
Pt presents to the ED via EMS from [facility] after aspirating on her tube feeds. RN at facility states that after suctioning the pt they were unable to get her O2 over 90%. Pt is only alert to pain which is her normal per [facility].  Pt is 88% on room air upon arrival. Pt placed on 2L O2

## 2021-08-11 LAB
EKG ATRIAL RATE: 105 BPM
EKG P AXIS: 63 DEGREES
EKG P-R INTERVAL: 146 MS
EKG Q-T INTERVAL: 330 MS
EKG QRS DURATION: 76 MS
EKG QTC CALCULATION (BAZETT): 436 MS
EKG R AXIS: 39 DEGREES
EKG T AXIS: 78 DEGREES
EKG VENTRICULAR RATE: 105 BPM

## 2021-08-11 NOTE — ED NOTES
Patient resting in bed. Respirations easy and unlabored. No distress noted. Call light within reach.  Antibiotics started at this time       Tequila Clark RN  08/10/21 2004

## 2021-08-11 NOTE — ED NOTES
Patient resting in bed. Respirations easy and unlabored. No distress noted. Call light within reach.        Lawayne Felty, RN  08/10/21 5252

## 2021-08-16 LAB
BLOOD CULTURE, ROUTINE: NORMAL
BLOOD CULTURE, ROUTINE: NORMAL

## 2021-08-27 ENCOUNTER — OUTSIDE SERVICES (OUTPATIENT)
Dept: FAMILY MEDICINE CLINIC | Age: 71
End: 2021-08-27

## 2021-08-27 VITALS
WEIGHT: 136 LBS | TEMPERATURE: 98.9 F | RESPIRATION RATE: 16 BRPM | BODY MASS INDEX: 24.09 KG/M2 | SYSTOLIC BLOOD PRESSURE: 116 MMHG | DIASTOLIC BLOOD PRESSURE: 72 MMHG | HEART RATE: 75 BPM

## 2021-08-27 DIAGNOSIS — R68.89 EXCESSIVE ORAL SECRETIONS: ICD-10-CM

## 2021-08-27 DIAGNOSIS — E46 PROTEIN-CALORIE MALNUTRITION, UNSPECIFIED SEVERITY (HCC): ICD-10-CM

## 2021-08-27 DIAGNOSIS — Z93.1 S/P PERCUTANEOUS ENDOSCOPIC GASTROSTOMY (PEG) TUBE PLACEMENT (HCC): ICD-10-CM

## 2021-08-27 DIAGNOSIS — I10 ESSENTIAL HYPERTENSION: ICD-10-CM

## 2021-08-27 DIAGNOSIS — F03.91 DEMENTIA WITH BEHAVIORAL DISTURBANCE, UNSPECIFIED DEMENTIA TYPE: Primary | ICD-10-CM

## 2021-08-27 DIAGNOSIS — G40.909 SEIZURE DISORDER (HCC): ICD-10-CM

## 2021-08-27 NOTE — PROGRESS NOTES
[facility] Progress Note    NAME: Gina Barnes  DATE: 21  ROOM #: 57A  : 1950  REASON FOR VISIT: No chief complaint on file. CODE STATUS: DNR-CCA    History obtained from chart review. SUBJECTIVE:  HPI: Gina Barnes is a 70 y.o. female. Pt seen and examined at bedside. Hx unobtainable due to advanced dementia. Resting in bed this morning. Nursing notes reviewed. Patient with noted coccyx wound. No drainage per nursing staff. Interval Hx- Patient seen in [facility] ED on 8/10/21 for possible aspiration. Admission was recommended after workup in ED but nocturnist felt d/c back to [facility] with antibiotics was more appropriate. Patient was discharged on levaquin. Patient has completed antibiotic course and has been stable. I have reviewed patients past medical, surgical, social, and family history and have made updates where appropriate. Allergies and Medications were reviewed through the [facility] EMR. Patient Active Problem List    Diagnosis Date Noted    Sepsis due to pneumonia ([facility] 2021    Coagulation defect ([facility] [facility] 2021    Schizophrenia ([facility] [facility] 2021    Late onset Alzheimer's disease with behavioral disturbance ([facility] [facility] 2019    Seizure disorder ([facility] [address] 2019    Essential hypertension     Elevated troponin level 2015    Abnormal ECG 2015         Review of Systems  Unable to perform secondary to advanced dementia    PHYSICAL EXAM:  Vitals:    21 0829   BP: 116/72   Pulse: 75   Resp: 16   Temp: 98.9 °F (37.2 °C)   Weight: 136 lb (61.7 kg)     Body mass index is 24.09 kg/m².     VS Reviewed  General Appearance: chronically ill appearing female in no acute distress  Head: normocephalic and atraumatic  Eyes: conjunctivae and eye lids without erythema  Neck: supple and non-tender without mass, no thyromegaly or thyroid nodules, no cervical lymphadenopathy  Pulmonary/Chest:Transmitted upper airway sounds secondary to increased oral secretions. No wheezes or crackles. Cardiovascular: normal rate, regular rhythm, normal S1 and S2, no murmurs, rubs, clicks, or gallops, distal pulses intact  Abdomen: soft, non-tender, non-distended, G-tube present on the upper abdomen with no erythema or swelling noted. G-tube with no obvious obstruction. Extremities: no cyanosis, clubbing or edema of the lower extremities  Musculoskeletal: No joint swelling or gross deformity   Neuro:  Nonverbal, unable to follow commands secondary to dementia. Psych:  Unable to assess  Skin: warm and dry, no rash or erythema      ASSESSMENT & PLAN  1. Dementia with behavioral disturbance, unspecified dementia type [facility]  -DNR CCA, End stage dementia.   -Continue lorazepam for behavioral issues  -Consider stopping Aricept at this time.  -Continue trazodone for sleep    2. Excessive oral secretions  -Continue scopolamine patch for excessive oral secretions. 3. Essential hypertension  -BP stable today.  -Continue furosemide 20 mg daily.  -Continue metoprolol 12.5 mg daily    4. Seizure disorder (HCC)  -Continue valproic acid and lorazepam.    5. S/P percutaneous endoscopic gastrostomy (PEG) tube placement ([facility]  -G-tube present continue all meds and feeding through G-tube. -Continue n.p.o.  -Nutren 2.0 at 38 mL/h for 20 hours daily  -Continue routine G-tube care    6.  Protein-calorie malnutrition, unspecified severity ([facility]  -Tube feedings as above  -Routine G-tube care        Electronically signed by Blas Mustafa DO on 8/27/2021 at 1:44 PM

## 2021-08-30 NOTE — PROGRESS NOTES
I was present for the key portions of the exam and history and confirmed all areas of the note with the patient, staff and the resident. No change from baseline, will continue to monitor. ASSESSMENT & PLAN  Carlos Jolley was seen today for dementia. Diagnoses and all orders for this visit:    Dementia with behavioral disturbance, unspecified dementia type (HCC)    Excessive oral secretions    Essential hypertension    Seizure disorder (HCC)    S/P percutaneous endoscopic gastrostomy (PEG) tube placement ([facility] [facility]    Protein-calorie malnutrition, unspecified severity ([facility]        No follow-ups on file.         Talitha Baumgarten, DO

## 2021-09-26 ENCOUNTER — OUTSIDE SERVICES (OUTPATIENT)
Dept: FAMILY MEDICINE CLINIC | Age: 71
End: 2021-09-26
Payer: MEDICARE

## 2021-09-26 DIAGNOSIS — I10 ESSENTIAL HYPERTENSION: Primary | Chronic | ICD-10-CM

## 2021-09-26 DIAGNOSIS — M62.81 MUSCLE WEAKNESS: ICD-10-CM

## 2021-09-26 DIAGNOSIS — G40.909 SEIZURE DISORDER (HCC): Chronic | ICD-10-CM

## 2021-09-26 DIAGNOSIS — F02.818 LATE ONSET ALZHEIMER'S DISEASE WITH BEHAVIORAL DISTURBANCE (HCC): Chronic | ICD-10-CM

## 2021-09-26 DIAGNOSIS — F20.9 SCHIZOPHRENIA, UNSPECIFIED TYPE (HCC): ICD-10-CM

## 2021-09-26 DIAGNOSIS — G30.1 LATE ONSET ALZHEIMER'S DISEASE WITH BEHAVIORAL DISTURBANCE (HCC): Chronic | ICD-10-CM

## 2021-09-26 PROCEDURE — 99310 SBSQ NF CARE HIGH MDM 45: CPT | Performed by: NURSE PRACTITIONER

## 2021-09-26 NOTE — PROGRESS NOTES
[facility] Progress Note    NAME: Nicolas García  DATE: 20  ROOM #: E 57-1  : 1950  REASON FOR VISIT: Other (routine visit)    CODE STATUS: DNR/CCA    History obtained from chart review, the and staff due to patient's advanced dementia. SUBJECTIVE:  HPI: Nicolas García is a 70 y.o. female. Patient has a PMH significant for:  Past Medical History:   Diagnosis Date    Alzheimer disease ([address]     Anxiety     Aphasia     Bipolar 2 disorder ([address]     CAD (coronary artery disease)     Cerebral artery occlusion with cerebral infarction ([address]     Chronic pain     Chronic UTI     Contracture of joint of shoulder region, left     Dementia ([address]     Depression     Dysphagia     GERD (gastroesophageal reflux disease)     Hyperlipidemia     Hypertension     Psychiatric problem     Seizures ([address]        Pt seen and examined at bedside today for routine visit and is noted to be in no acute distress. Staff provide appropriate updates; appreciate staff input. She is seen in [facility] ED frequently for aspiration pneumonia. I will discuss code status and patient's overall condition with family on 10/1/21 at [facility]. I believe she is a candidate for Hospice. I have reviewed patients past medical, surgical, social, and family history and have made updates where appropriate. Allergies and Medications were reviewed through the [facility] EMR.     Patient Active Problem List    Diagnosis Date Noted    Muscle weakness 2021    Sepsis due to pneumonia ([address] 2021    Coagulation defect ([address] 2021    Schizophrenia ([address] 2021    Late onset Alzheimer's disease with behavioral disturbance ([address] 2019    Seizure disorder ([address] 2019    Essential hypertension     Elevated troponin level 2015    Abnormal ECG 2015       REVIEW OF SYSTEMS  Positive responses are highlighted in bold  Constitutional:  Fever, Chills, Night Sweats, Fatigue, Unexpected changes in weight  HENT:  Ear pain, Tinnitus, Nosebleeds, Trouble swallowing, Hearing loss, Sore throat  Cardiovascular:  Chest Pain, Palpitations, Orthopnea, Paroxysmal Nocturnal Dyspnea  Respiratory:  Cough, Wheezing, Shortness of breath, Chest tightness, Apnea  Gastrointestinal:  Nausea, Vomiting, Diarrhea, Constipation, Heartburn, Blood in stool  Genitourinary:  Difficulty or painful urination, Flank pain, Change in frequency, Urgency  Skin:  Color change, Rash, Itching, Wound  Musculoskeletal:  Joint pain, Back pain, Gait problems, Joint swelling, Myalgias  Neurological:  Dizziness, Headaches, Presyncope, Numbness, Seizures, Tremors  Endocrine:  Heat Intolerance, Cold Intolerance, Polydipsia, Polyphagia, Polyuria    OBJECTIVE:  PHYSICAL EXAM:  VS:  112/69, 97.1, 71, 16, 94%  Weight in pounds:  136.2  Pain: No s/s of pain    VS reviewed. General Appearance: Debilitated. Appears older than stated age. Head: normocephalic and atraumatic  Eyes: conjunctivae and eye lids without erythema  ENT: external ear and ear canal normal bilaterally, nose without deformity, nasal mucosa and turbinates normal without polyps, oropharynx normal, dentition is normal for age, no lip or gum lesions noted  Neck: supple and non-tender without mass, no thyromegaly or thyroid nodules, no cervical lymphadenopathy  Pulmonary/Chest: clear but diminished to auscultation bilaterally- no wheezes, rales or rhonchi, normal air movement, no respiratory distress or retractions, requires no supplemental oxygen  Cardiovascular: normal rate, regular rhythm, normal S1 and S2, no murmurs, rubs, clicks, or gallops, distal pulses intact  Abdomen: soft, non-tender, non-distended, bowel sounds physiologic,  no rebound or guarding, no masses or hernias noted. Liver and spleen without enlargement.    Extremities: no cyanosis, clubbing or edema of the lower extremities  Musculoskeletal: No joint swelling or gross deformity   Neuro:  Alert, 4/5 strength globally and symmetrically, normal speech, no focal findings or movement disorder noted  Psych:  Normal affect without evidence of depression or anxiety, insight and judgement are impaired, memory appears impaired  Skin: warm and dry, no rash or erythema    ASSESSMENT & PLAN:    1. Essential hypertension  BPs have been lower in the 1 teens. HR has been in the low 70s. Will stop Metoprolol 12.5 mg bid. Continue Midodrine, most likely for orthostatic hypotension. Routine lab monitoring. 2.  History of CHF. Continue lasix. Weights stable. No s/s of CHF exacerbation. 3. Late onset Alzheimer's disease with behavioral disturbance ([facility]  Oriented to name only. Does not follow simple commands. Requires assistance for all ADLs. Continue Donepezil. 4.  Bipolar disorder  Stable. Patient was formerly treated by Dr. Socrates Cardoza, Psychiatry. Historical medications Wellbutrin, Seroquel, Geodon, Ambien have since been discontinued. Psych 360 is on board at [facility] - appreciate their insight. Continue Depakote as ordered. Staff report no new or increased behaviors. Antipsychotic/Antianxiety/Hypnotic/Psychotropic/Sedation/Antidepressant medications are continued at this time because discontinuation may result in adverse effects of return or concerning behaviors/symptoms. 5.  Major depressive disorder, single episode, unspecified. As per above. 5.  Other specified anxiety disorders  Continue Ativan as ordered. No s/s of anxiety at time of assessment. Antipsychotic/Antianxiety/Hypnotic/Psychotropic/Sedation/Antidepressant medications are continued at this time because discontinuation may result in adverse effects of return or concerning behaviors/symptoms. 7.  Insomnia due to medical condition  Continues to do well with Trazodone. 8.  Cerebral infarction, unspecified  Stable. Continue ASA. No statin. 9.  Hyperlipidemia    10. Seizure disorder [facility])  Staff report no new seizure activity.   Continue Valproic Acid and Ativan. Asking staff to schedule appointment with Neurology if family agrees. 11.  Chronic osteoarthritis  Continue Meloxicam, Biofreeze, Tylenol as ordered. 12.  Dysphagia, oropharyngeal phase  Remains NPO. Continues to tolerate Enteral tube feedings well. No s/s of aspiration. Maintains a stable weight. 13.  Increased oral secretions  Controlled with Scopolamine. 14.  GERD without esophagitis  Pepcid. 15.  Constipation, unspecified  Controlled with current bowel regimen. 16.  Pneumonia  Pt with congested cough. Stat chest x-ray was obtained. XRAY CHEST 1 VIEW Comparison: New from 08/03/2021  FINDINGS: No focal consolidation or pneumothorax. Hazy opacities at the LEFT lung base. The costophrenic angles are sharp. The heart is enlarged. The regional osseous structures are without acute abnormality. CONCLUSION: LEFT lower lobe atelectasis versus infiltrate. Follow up recommended. Initiating Levofloxacin 750 mg po every other day x 7 days; renal dosing required for 8/4 GFR 49. Continue to monitor closely. Disposition:  Assessment and plan as stated above. Follow-up per routine and as warranted. Plan of care reviewed by Dr. Olivier Biswas DO.   Electronically signed by ALBINO Stockton NP on 9/26/2021 at 12:43 PM

## 2021-10-01 ENCOUNTER — OUTSIDE SERVICES (OUTPATIENT)
Dept: FAMILY MEDICINE CLINIC | Age: 71
End: 2021-10-01
Payer: MEDICARE

## 2021-10-01 DIAGNOSIS — G40.909 SEIZURE DISORDER (HCC): Primary | Chronic | ICD-10-CM

## 2021-10-01 DIAGNOSIS — Z43.1 ENCOUNTER FOR PEG (PERCUTANEOUS ENDOSCOPIC GASTROSTOMY) (HCC): ICD-10-CM

## 2021-10-01 DIAGNOSIS — R13.19 OTHER DYSPHAGIA: ICD-10-CM

## 2021-10-01 DIAGNOSIS — G30.1 LATE ONSET ALZHEIMER'S DISEASE WITH BEHAVIORAL DISTURBANCE (HCC): Chronic | ICD-10-CM

## 2021-10-01 DIAGNOSIS — F20.9 SCHIZOPHRENIA, UNSPECIFIED TYPE (HCC): ICD-10-CM

## 2021-10-01 DIAGNOSIS — F02.818 LATE ONSET ALZHEIMER'S DISEASE WITH BEHAVIORAL DISTURBANCE (HCC): Chronic | ICD-10-CM

## 2021-10-01 PROCEDURE — 99310 SBSQ NF CARE HIGH MDM 45: CPT | Performed by: NURSE PRACTITIONER

## 2021-10-01 NOTE — PROGRESS NOTES
[facility] Progress Note    NAME: Donna Field  DATE: 10/01/21  ROOM #: E 57-1  CODE STATUS: DNR/CCA  CHIEF COMPLAINT:  Family meeting to discuss plan of care and code status  : 1950    History obtained from chart review, family and staff. SUBJECTIVE:  HPI: Donna Field is a 70 y.o. female. PMH documented below. Today, I met with patient's step father, her half brother and her half sister. Patient's overall decline in condition was discussed, as well as her recurrent pneumonia and progressive dementia. Step father and half siblings with many appropriate questions and clear insight that Leyla's overall health continues to decline. They do not want to \"see her suffer\" and agree that her quality of life is minimal.  Family was educated that patient was treated at [facility] ED in , July and 2021 for pneumonia. She is currently receiving Levofloxacin at [facility] for pneumonia. Step father will meet with administrative care windy in the next few weeks and will decide if DNR/CC code status is appropriate for Leyla. Family requests to keep DNR/CCA code status for now. Family visited briefly with Jose Burgess after the meeting and are empathetic to her overall decline in condition. I have encouraged family to update nursing staff with any questions or concerns. Allergies and Medications were reviewed through the [facility] EMR. All medications reviewed and reconciled, including OTC and herbal medications.      Patient Active Problem List    Diagnosis Date Noted    Encounter for PEG (percutaneous endoscopic gastrostomy) ([facility] 10/01/2021    Other dysphagia 10/01/2021    Muscle weakness 2021    Pneumonia due to infectious organism 2021    Coagulation defect ([facility] 2021    Schizophrenia ([facility] 2021    Late onset Alzheimer's disease with behavioral disturbance ([address] 2019    Seizure disorder ([facility] 2019    Essential hypertension     Elevated troponin level 2015  Abnormal ECG 09/01/2015       Past Medical History:   Diagnosis Date    Alzheimer disease ([facility]     Anxiety     Aphasia     Bipolar 2 disorder ([facility]     CAD (coronary artery disease)     Cerebral artery occlusion with cerebral infarction ([facility]     Chronic pain     Chronic UTI     Contracture of joint of shoulder region, left     Dementia ([facility]     Depression     Dysphagia     GERD (gastroesophageal reflux disease)     Hyperlipidemia     Hypertension     Psychiatric problem     Seizures ([facility] [facility]        Past Surgical History:   Procedure Laterality Date    GASTROSTOMY TUBE PLACEMENT N/A 2/17/2021    EGD PEG TUBE PLACEMENT performed by Lady Valera MD at [facility]    TONSILLECTOMY      TUBAL LIGATION         Allergies   Allergen Reactions    Pcn [Penicillins]        Social History     Tobacco Use    Smoking status: Former Smoker     Types: Cigarettes    Smokeless tobacco: Never Used   Substance Use Topics    Alcohol use: No        Family History   Problem Relation Age of Onset    Other Mother      VS:  112/69, 97.6, 71, 16, 99% on room air  Weight in pounds:  134.0  Pain: Pt denies pain. VS Reviewed    LABS/IMAGING    8/4/21:  Na 144, K 3.9, chl 99, CO2 33, BUN 55, Creat 1.1, BUN 50, GFR 59.  8/4/21:  WBC 8.7, Hgb 10.5, platelets 046.    2/64/92:  XRAY CHEST 1 VIEW Comparison: New from 08/03/2021  See Note  FINDINGS: No focal consolidation or pneumothorax. Hazy opacities at the LEFT lung base. The costophrenic angles are sharp. The heart is enlarged. The regional osseous structures are without acute abnormality. CONCLUSION: LEFT lower lobe atelectasis versus infiltrate. Follow up recommended. ASSESSMENT & PLAN    1. Left lower lobe pneumonia  Initiating a course of Levofloxacin, 750 mg po every 48 hours x 7 days for renal decline. Continue Duonebs and Tylenol for comfort. Repeat CBC and BMP are pending. Influenza A&B and Covid swabs negative thus far. VS stable.   Continue to monitor. 2. Seizure disorder [facility])  Staff report no seizure activity. Continue Valproic Acid, Ativan. 3. Schizophrenia, unspecified type ([facility] [address]  As per above. 4. Late onset Alzheimer's disease with behavioral disturbance (HCC)  Continue Donepezil and Trazodone for comfort. 5. Encounter for PEG (percutaneous endoscopic gastrostomy) ([facility]  Continue enteral feedings. 6. Other dysphagia  Remains NPO. Continue scopolamine patch for increased oral secretions. Disposition:  Assessment and plan as stated above. Follow up per routine and as warranted. I await family decision to code status. Total time:  40 minutes. Plan of care reviewed with Dr. Saroj Triplett DO.   Electronically signed by ALBINO Fuentes NP on 10/1/2021 at 7:17 PM

## 2021-10-08 ENCOUNTER — OUTSIDE SERVICES (OUTPATIENT)
Dept: FAMILY MEDICINE CLINIC | Age: 71
End: 2021-10-08
Payer: MEDICARE

## 2021-10-08 VITALS
HEART RATE: 71 BPM | DIASTOLIC BLOOD PRESSURE: 69 MMHG | SYSTOLIC BLOOD PRESSURE: 112 MMHG | WEIGHT: 134 LBS | TEMPERATURE: 97.8 F | BODY MASS INDEX: 23.74 KG/M2 | RESPIRATION RATE: 18 BRPM

## 2021-10-08 DIAGNOSIS — G30.1 LATE ONSET ALZHEIMER'S DISEASE WITH BEHAVIORAL DISTURBANCE (HCC): Chronic | ICD-10-CM

## 2021-10-08 DIAGNOSIS — I10 ESSENTIAL HYPERTENSION: Chronic | ICD-10-CM

## 2021-10-08 DIAGNOSIS — G40.909 SEIZURE DISORDER (HCC): Chronic | ICD-10-CM

## 2021-10-08 DIAGNOSIS — F02.818 LATE ONSET ALZHEIMER'S DISEASE WITH BEHAVIORAL DISTURBANCE (HCC): Chronic | ICD-10-CM

## 2021-10-08 DIAGNOSIS — D68.9 COAGULATION DEFECT (HCC): Primary | ICD-10-CM

## 2021-10-08 PROCEDURE — 99308 SBSQ NF CARE LOW MDM 20: CPT | Performed by: FAMILY MEDICINE

## 2021-10-08 NOTE — PROGRESS NOTES
[facility] Progress Note    NAME: Saroj Talley  DATE: 10/08/21  ROOM #: 57A  : 1950  REASON FOR VISIT: No chief complaint on file. CODE STATUS: DNR-CCA    History obtained from chart review. SUBJECTIVE:  HPI: Saroj Talley is a 70 y.o. female. Pt seen and examined at bedside. Hx unobtainable due to advanced dementia. Resting in bed this morning. Nursing notes reviewed. Patient with noted coccyx wound. No drainage per nursing staff. Interval Hx- Diagnosed with pneumonia again. Susanna Guillory NP met with family who are considering DNR-CC. Remains DNR-CCA at this time. VS remain stable. No changes per nursing staff    I have reviewed patients past medical, surgical, social, and family history and have made updates where appropriate. Allergies and Medications were reviewed through the [facility] EMR. Patient Active Problem List    Diagnosis Date Noted    Encounter for PEG (percutaneous endoscopic gastrostomy) ([facility] [facility] 10/01/2021    Other dysphagia 10/01/2021    Muscle weakness 2021    Pneumonia due to infectious organism 2021    Coagulation defect ([facility] 2021    Schizophrenia ([facility] 2021    Late onset Alzheimer's disease with behavioral disturbance ([facility] 2019    Seizure disorder ([facility] 2019    Essential hypertension     Elevated troponin level 2015    Abnormal ECG 2015         Review of Systems  Unable to perform secondary to advanced dementia    PHYSICAL EXAM:  Vitals:    10/08/21 1346   BP: 112/69   Pulse: 71   Resp: 18   Temp: 97.8 °F (36.6 °C)   Weight: 134 lb (60.8 kg)     Body mass index is 23.74 kg/m².     VS Reviewed  General Appearance: chronically ill appearing female in no acute distress  Head: normocephalic and atraumatic  Eyes: conjunctivae and eye lids without erythema  Neck: supple and non-tender without mass, no thyromegaly or thyroid nodules, no cervical lymphadenopathy  Pulmonary/Chest:Transmitted upper airway sounds secondary to increased oral secretions. No wheezes or crackles. Cardiovascular: normal rate, regular rhythm, normal S1 and S2, no murmurs, rubs, clicks, or gallops, distal pulses intact  Abdomen: soft, non-tender, non-distended, G-tube present on the upper abdomen with no erythema or swelling noted. G-tube with no obvious obstruction. Extremities: no cyanosis, clubbing or edema of the lower extremities  Musculoskeletal: No joint swelling or gross deformity   Neuro:  Nonverbal, unable to follow commands secondary to dementia. Psych:  Unable to assess  Skin: warm and dry, no rash or erythema      ASSESSMENT & PLAN  1. Dementia with behavioral disturbance, unspecified dementia type ([facility] [facility]  -DNR CCA, End stage dementia.   -Continue lorazepam for behavioral issues  -Consider stopping Aricept at this time.  -Continue trazodone for sleep    2. Left lower lob pneumonia  -On levaquin 750mg po q48h for 7 days. -VS stable  -Duonebs and tylenol for comfort  -Recurrent pneumonia. DNR-CC discussed with family who are considering transition but patient to remain as DNR-CCA until further notice. 3. Excessive oral secretions  -Continue scopolamine patch for excessive oral secretions. 4. Essential hypertension  -BP stable today.  -Continue furosemide 20 mg daily.  -Continue metoprolol 12.5 mg daily    5. Seizure disorder (HCC)  -Continue valproic acid and lorazepam.    6. S/P percutaneous endoscopic gastrostomy (PEG) tube placement ([facility] [address]  -G-tube present continue all meds and feeding through G-tube. -Continue n.p.o.  -Nutren 2.0 at 38 mL/h for 20 hours daily  -Continue routine G-tube care    7.  Protein-calorie malnutrition, unspecified severity ([facility] [address]  -Tube feedings as above  -Routine G-tube care        Electronically signed by Kevin Falcon DO on 10/8/2021 at 1:51 PM

## 2021-10-08 NOTE — PROGRESS NOTES
S: 70 y.o. female with   Chief Complaint   Patient presents with    Dementia     ASSESSMENT & Morena Gene was seen today for dementia. Diagnoses and all orders for this visit:    Coagulation defect [facility])    Essential hypertension    Late onset Alzheimer's disease with behavioral disturbance ([facility] [facility]    Seizure disorder ([facility] [facility]        No follow-ups on file. Attending Physician Statement  I have discussed the case, including pertinent history and exam findings with the resident. I agree with the documented assessment and plan as documented by the resident.   GE modifier added to this encounter      Elis Oneil DO 10/8/2021 1:57 PM

## 2021-11-12 ENCOUNTER — OUTSIDE SERVICES (OUTPATIENT)
Dept: FAMILY MEDICINE CLINIC | Age: 71
End: 2021-11-12
Payer: MEDICARE

## 2021-11-12 DIAGNOSIS — E46 PROTEIN-CALORIE MALNUTRITION, UNSPECIFIED SEVERITY (HCC): ICD-10-CM

## 2021-11-12 DIAGNOSIS — R68.89 EXCESSIVE ORAL SECRETIONS: ICD-10-CM

## 2021-11-12 DIAGNOSIS — Z93.1 S/P PERCUTANEOUS ENDOSCOPIC GASTROSTOMY (PEG) TUBE PLACEMENT (HCC): ICD-10-CM

## 2021-11-12 DIAGNOSIS — I10 ESSENTIAL HYPERTENSION: ICD-10-CM

## 2021-11-12 DIAGNOSIS — F03.91 DEMENTIA WITH BEHAVIORAL DISTURBANCE, UNSPECIFIED DEMENTIA TYPE: Primary | ICD-10-CM

## 2021-11-12 DIAGNOSIS — G40.909 SEIZURE DISORDER (HCC): ICD-10-CM

## 2021-11-12 PROCEDURE — 99308 SBSQ NF CARE LOW MDM 20: CPT | Performed by: FAMILY MEDICINE

## 2021-11-12 NOTE — PROGRESS NOTES
[facility] Progress Note    NAME: Rogelio Govea  DATE: 21  ROOM #: [id number]  : 1950  REASON FOR VISIT: No chief complaint on file. CODE STATUS: DNR-CCA    History obtained from chart review. SUBJECTIVE:  HPI: Rogelio Govea is a 70 y.o. female. Pt seen and examined at bedside. Hx unobtainable due to advanced dementia. Resting in bed this morning. Nursing notes reviewed. Interval Hx- No recent hospitalizations. Continues to be DNR-CCA. Has been doing well. No recent medication changes. No nursing concerns today. Continues NPO and Tube feed diet. I have reviewed patients past medical, surgical, social, and family history and have made updates where appropriate. Allergies and Medications were reviewed through the [facility] EMR. Patient Active Problem List    Diagnosis Date Noted    Encounter for PEG (percutaneous endoscopic gastrostomy) ([facility] 10/01/2021    Other dysphagia 10/01/2021    Muscle weakness 2021    Pneumonia due to infectious organism 2021    Coagulation defect ([facility] 2021    Schizophrenia ([facility] 2021    Late onset Alzheimer's disease with behavioral disturbance ([facility] 2019    Seizure disorder ([facility] 2019    Essential hypertension     Elevated troponin level 2015    Abnormal ECG 2015         Review of Systems  Unable to perform secondary to advanced dementia    PHYSICAL EXAM:  There were no vitals filed for this visit. There is no height or weight on file to calculate BMI. VS Reviewed  General Appearance: chronically ill appearing female in no acute distress  Head: normocephalic and atraumatic  Eyes: conjunctivae and eye lids without erythema  Neck: supple and non-tender without mass, no thyromegaly or thyroid nodules, no cervical lymphadenopathy  Pulmonary/Chest:Transmitted upper airway sounds secondary to increased oral secretions improved from prior examination. No wheezes or crackles.    Cardiovascular: normal rate, regular rhythm, normal S1 and S2, no murmurs, rubs, clicks, or gallops, distal pulses intact  Abdomen: soft, non-tender, non-distended, G-tube present on the upper abdomen with no erythema or swelling noted. G-tube with no obvious obstruction. Extremities: no cyanosis, clubbing or edema of the lower extremities  Musculoskeletal: No joint swelling or gross deformity   Neuro:  Nonverbal, unable to follow commands secondary to dementia. Psych:  Unable to assess  Skin: warm and dry, no rash or erythema      ASSESSMENT & PLAN  1. Dementia with behavioral disturbance, unspecified dementia type ([facility]  -DNR CCA, End stage dementia.   -Continue lorazepam for behavioral issues  -Consider stopping Aricept at this time.  -Continue trazodone for sleep    2. Excessive oral secretions  -Continue scopolamine patch for excessive oral secretions. 3. Essential hypertension  -BP stable today.  -Continue furosemide 20 mg daily.  -Continue metoprolol 12.5 mg daily    4. Seizure disorder (HCC)  -Continue valproic acid and lorazepam.    5. S/P percutaneous endoscopic gastrostomy (PEG) tube placement ([facility]  -G-tube present continue all meds and feeding through G-tube. -Continue n.p.o.  -Nutren 2.0 at 38 mL/h for 20 hours daily  -Continue routine G-tube care    6.  Protein-calorie malnutrition, unspecified severity ([facility]  -Tube feedings as above  -Routine G-tube care        Electronically signed by Thomas Romero DO on 11/12/2021 at 8:58 AM

## 2021-11-13 NOTE — PROGRESS NOTES
S: 70 y.o. female with   Chief Complaint   Patient presents with    Seizures     [address] was seen today for seizures. Diagnoses and all orders for this visit:    Dementia with behavioral disturbance, unspecified dementia type (HCC)    Excessive oral secretions    S/P percutaneous endoscopic gastrostomy (PEG) tube placement (HCC)    Seizure disorder (HCC)    Essential hypertension    Protein-calorie malnutrition, unspecified severity ([facility]        No follow-ups on file. Attending Physician Statement  I have discussed the case, including pertinent history and exam findings with the resident. I agree with the documented assessment and plan as documented by the resident.   GE modifier added to this encounter      Madhavi Gilmore DO 11/12/2021 7:56 PM

## 2021-12-10 ENCOUNTER — OUTSIDE SERVICES (OUTPATIENT)
Dept: FAMILY MEDICINE CLINIC | Age: 71
End: 2021-12-10
Payer: MEDICARE

## 2021-12-10 VITALS
SYSTOLIC BLOOD PRESSURE: 122 MMHG | HEART RATE: 69 BPM | DIASTOLIC BLOOD PRESSURE: 74 MMHG | BODY MASS INDEX: 24.8 KG/M2 | TEMPERATURE: 98 F | WEIGHT: 140 LBS

## 2021-12-10 DIAGNOSIS — G40.909 SEIZURE DISORDER (HCC): Chronic | ICD-10-CM

## 2021-12-10 DIAGNOSIS — I10 ESSENTIAL HYPERTENSION: Primary | Chronic | ICD-10-CM

## 2021-12-10 DIAGNOSIS — G30.1 LATE ONSET ALZHEIMER'S DISEASE WITH BEHAVIORAL DISTURBANCE (HCC): Chronic | ICD-10-CM

## 2021-12-10 DIAGNOSIS — F02.818 LATE ONSET ALZHEIMER'S DISEASE WITH BEHAVIORAL DISTURBANCE (HCC): Chronic | ICD-10-CM

## 2021-12-10 PROCEDURE — 99308 SBSQ NF CARE LOW MDM 20: CPT | Performed by: FAMILY MEDICINE

## 2021-12-10 NOTE — PROGRESS NOTES
[facility] Progress Note    NAME: Rashawn Barrett  DATE: 12/10/21  ROOM #: 57A  : 1950  REASON FOR VISIT: Dementia    CODE STATUS: DNR-CCA    History obtained from chart review. SUBJECTIVE:  HPI: Rashawn Barrett is a 70 y.o. female. Pt seen and examined at bedside. Hx unobtainable due to advanced dementia. Resting in bed this morning. Nursing notes reviewed. Interval Hx- No recent hospitalizations or ED visits. Remains DNR-CCA. No recent medication changes. Continues NPO and tube feed diet. No recent fevers or signs of infection. 6lb weight gain noted from prior month's weight    I have reviewed patients past medical, surgical, social, and family history and have made updates where appropriate. Allergies and Medications were reviewed through the [facility] EMR. Patient Active Problem List    Diagnosis Date Noted    Encounter for PEG (percutaneous endoscopic gastrostomy) ([facility] 10/01/2021    Other dysphagia 10/01/2021    Muscle weakness 2021    Pneumonia due to infectious organism 2021    Coagulation defect ([facility] 2021    Schizophrenia ([facility] 2021    Late onset Alzheimer's disease with behavioral disturbance ([facility] 2019    Seizure disorder ([facility] 2019    Essential hypertension     Elevated troponin level 2015    Abnormal ECG 2015         Review of Systems  Unable to perform secondary to advanced dementia    PHYSICAL EXAM:  Vitals:    12/10/21 1701   BP: 122/74   Pulse: 69   Temp: 98 °F (36.7 °C)   Weight: 140 lb (63.5 kg)     Body mass index is 24.8 kg/m².     VS Reviewed  General Appearance: chronically ill appearing female in no acute distress  Head: normocephalic and atraumatic  Eyes: conjunctivae and eye lids without erythema  Neck: supple and non-tender without mass, no thyromegaly or thyroid nodules, no cervical lymphadenopathy  Pulmonary/Chest:Transmitted upper airway sounds secondary to increased oral secretions improved from prior examination. No wheezes or crackles. Cardiovascular: normal rate, regular rhythm, normal S1 and S2, no murmurs, rubs, clicks, or gallops, distal pulses intact  Abdomen: soft, non-tender, non-distended, G-tube present on the upper abdomen with no erythema or swelling noted. G-tube with no obvious obstruction. Extremities: no cyanosis, clubbing or edema of the lower extremities  Musculoskeletal: No joint swelling or gross deformity   Neuro:  Nonverbal, unable to follow commands secondary to dementia. Psych:  Unable to assess  Skin: warm and dry, no rash or erythema      ASSESSMENT & PLAN  1. Dementia with behavioral disturbance, unspecified dementia type ([facility]  -DNR CCA, End stage dementia.   -Continue lorazepam for behavioral issues  -Consider stopping Aricept at this time.  -Continue trazodone for sleep    2. Excessive oral secretions  -Continue scopolamine patch for excessive oral secretions. 3. Essential hypertension  -BP stable today.  -Continue furosemide 20 mg daily.  -Continue metoprolol 12.5 mg daily    4. Seizure disorder (HCC)  -Continue valproic acid and lorazepam.    5. S/P percutaneous endoscopic gastrostomy (PEG) tube placement ([facility]  -G-tube present continue all meds and feeding through G-tube. -Continue n.p.o.  -Nutren 2.0 at 38 mL/h for 20 hours daily  -Continue routine G-tube care    6. Protein-calorie malnutrition, unspecified severity ([facility]  -Tube feedings as above. Did have 6lb weight gain in past month. No signs of fluid overload on exam. Continue to monitor respiratory status and monitor for signs of fluid overload.    -Routine G-tube care        Electronically signed by Kevin Falcon DO on 12/10/2021 at 5:04 PM

## 2021-12-11 NOTE — PROGRESS NOTES
S: 70 y.o. female with   Chief Complaint   Patient presents with    Dementia       ASSESSMENT & David Brown was seen today for dementia. Diagnoses and all orders for this visit:    Essential hypertension    Late onset Alzheimer's disease with behavioral disturbance ([facility]    Seizure disorder ([facility]        No follow-ups on file. Plan per resident note    Attending Physician Statement  I have discussed the case, including pertinent history and exam findings with the resident. I agree with the documented assessment and plan as documented by the resident.   GE modifier added to this encounter      Lyssa Preston DO 12/11/2021 8:23 AM

## 2021-12-21 ENCOUNTER — OUTSIDE SERVICES (OUTPATIENT)
Dept: FAMILY MEDICINE CLINIC | Age: 71
End: 2021-12-21
Payer: MEDICARE

## 2021-12-21 DIAGNOSIS — G40.909 SEIZURE DISORDER (HCC): Primary | Chronic | ICD-10-CM

## 2021-12-21 PROCEDURE — 99309 SBSQ NF CARE MODERATE MDM 30: CPT

## 2021-12-22 VITALS
RESPIRATION RATE: 18 BRPM | HEART RATE: 69 BPM | WEIGHT: 139.6 LBS | TEMPERATURE: 97.6 F | BODY MASS INDEX: 24.73 KG/M2 | DIASTOLIC BLOOD PRESSURE: 74 MMHG | SYSTOLIC BLOOD PRESSURE: 122 MMHG | OXYGEN SATURATION: 96 %

## 2021-12-22 NOTE — PROGRESS NOTES
[facility] Progress Note  Acute visit for   Chief Complaint   Patient presents with    Seizures   . NAME: Rose Mary Perea  DATE: 21  : 1950  Code Status: DNR CCA    History obtained from chart review, staff, resident. SUBJECTIVE:  HPI: Rose Mary Perea is a 70 y.o. female. Pt seen and examined at bedside. Nursing staff note that around 6 AM this morning she had a witnessed seizure. Seizure consisted of eyes opening, rolling in the back of head, and making eye fluttering pattern. The seizure activity lasted less than 1 minute. Resident is bedridden, and does not speak or follow verbal commands. She is typically unresponsive. She has a history of seizures. Currently on Depakote and lorazepam. Resident is stable at time of exam. Baseline per staff. Allergies and Medications were reviewed through the [facility] EMR. All medications reviewed and reconciled, including OTC and herbal medications.      Patient Active Problem List    Diagnosis Date Noted    Encounter for PEG (percutaneous endoscopic gastrostomy) ([address] 10/01/2021    Other dysphagia 10/01/2021    Muscle weakness 2021    Pneumonia due to infectious organism 2021    Coagulation defect ([address] 2021    Schizophrenia ([address] 2021    Late onset Alzheimer's disease with behavioral disturbance ([address] 2019    Seizure disorder ([address] 2019    Essential hypertension     Elevated troponin level 2015    Abnormal ECG 2015       Past Medical History:   Diagnosis Date    Alzheimer disease ([address]     Anxiety     Aphasia     Bipolar 2 disorder ([address]     CAD (coronary artery disease)     Cerebral artery occlusion with cerebral infarction ([address]     Chronic pain     Chronic UTI     Contracture of joint of shoulder region, left     Dementia ([address]     Depression     Dysphagia     GERD (gastroesophageal reflux disease)     Hyperlipidemia     Hypertension     Psychiatric problem     atraumatic  Eyes: pupils equal. conjunctivae and eye lids without erythema  ENT: external ear and ear canal normal bilaterally, nose without deformity, nasal mucosa and turbinates normal without polyps, oropharynx normal, dentition is normal for age, no lip or gum lesions noted  Neck: supple and non-tender without mass, no thyromegaly or thyroid nodules, no cervical lymphadenopathy  Pulmonary/Chest: clear to auscultation bilaterally- no wheezes, rales or rhonchi, normal air movement, no respiratory distress or retractions. Requires no supplemental oxygen at time of assessment. Cardiovascular: normal rate, regular rhythm, normal S1 and S2, no murmurs, rubs, clicks, or gallops, distal pulses intact  Abdomen: soft, non-tender, non-distended, bowel sounds physiologic,  no rebound or guarding, no masses or hernias noted. Liver and spleen without enlargement. PEG tube in place. Continuous tube feeding. Extremities: no cyanosis, clubbing or edema of the lower extremities  Musculoskeletal: No joint swelling or gross deformity   Neuro:  no focal findings or movement disorder noted  Psych: Patient sleeping. Does not arouse to verbal command. Skin: pink, warm and dry, no rash or erythema  Lymph:  No cervical, auricular or supraclavicular lymph nodes palpated      ASSESSMENT & PLAN  1. Seizure disorder (HCC)  CBC, BMP, Depakote level ordered. All within normal limits. Vital signs every 8 hours x72 hours ordered. Decision was made with collaborative to not send resident to hospital at this time. She has a history of seizures. Continue valproic acid, lorazepam. Continue to monitor for any new signs or symptoms of seizure activity. No future appointments. Disposition:  Assessment and plan as stated above. Follow up per routine and as warranted. Total time spent on date of service was 35 minutes.  Time spent includes some or all of the following, both face-to-face time and non face-to-face, but is not limited to: reviewing records or discussing history or plan with colleagues; obtaining and/or reviewing the history; performing a medically appropriate examination/evaluation; counseling patient and/or caregiver; documentation, placing orders/referrals, and coordinating care. **This report has been created using voice recognition software. It may contain minor errors which are inherent in voice recognition technology. **       Plan of care reviewed with Dr. Jack Dumont DO.   Electronically signed by ALBINO Palacio CNP on 12/22/2021 at 3:14 PM

## 2022-01-28 ENCOUNTER — OUTSIDE SERVICES (OUTPATIENT)
Dept: FAMILY MEDICINE CLINIC | Age: 72
End: 2022-01-28
Payer: COMMERCIAL

## 2022-01-28 VITALS
DIASTOLIC BLOOD PRESSURE: 72 MMHG | WEIGHT: 142 LBS | RESPIRATION RATE: 18 BRPM | HEART RATE: 69 BPM | BODY MASS INDEX: 25.15 KG/M2 | TEMPERATURE: 98 F | SYSTOLIC BLOOD PRESSURE: 103 MMHG

## 2022-01-28 DIAGNOSIS — D68.9 COAGULATION DEFECT (HCC): ICD-10-CM

## 2022-01-28 DIAGNOSIS — E46 PROTEIN-CALORIE MALNUTRITION, UNSPECIFIED SEVERITY (HCC): ICD-10-CM

## 2022-01-28 DIAGNOSIS — F02.818 LATE ONSET ALZHEIMER'S DISEASE WITH BEHAVIORAL DISTURBANCE (HCC): ICD-10-CM

## 2022-01-28 DIAGNOSIS — Z93.1 S/P PERCUTANEOUS ENDOSCOPIC GASTROSTOMY (PEG) TUBE PLACEMENT (HCC): ICD-10-CM

## 2022-01-28 DIAGNOSIS — I10 ESSENTIAL HYPERTENSION: Primary | Chronic | ICD-10-CM

## 2022-01-28 DIAGNOSIS — F20.9 SCHIZOPHRENIA, UNSPECIFIED TYPE (HCC): ICD-10-CM

## 2022-01-28 DIAGNOSIS — G40.909 SEIZURE DISORDER (HCC): ICD-10-CM

## 2022-01-28 DIAGNOSIS — G30.1 LATE ONSET ALZHEIMER'S DISEASE WITH BEHAVIORAL DISTURBANCE (HCC): ICD-10-CM

## 2022-01-28 PROCEDURE — 99308 SBSQ NF CARE LOW MDM 20: CPT | Performed by: FAMILY MEDICINE

## 2022-01-28 NOTE — PROGRESS NOTES
S: 70 y.o. female with   Chief Complaint   Patient presents with    Dementia    Seizures       ASSESSMENT & Antonio Person was seen today for dementia and seizures. Diagnoses and all orders for this visit:    Essential hypertension    Protein-calorie malnutrition, unspecified severity ([address]    S/P percutaneous endoscopic gastrostomy (PEG) tube placement ([address]    Schizophrenia, unspecified type ([address]    Late onset Alzheimer's disease with behavioral disturbance ([address]    Seizure disorder ([address]    Coagulation defect ([address]        No follow-ups on file. Plan per resident note    Attending Physician Statement  I have discussed the case, including pertinent history and exam findings with the resident. I agree with the documented assessment and plan as documented by the resident.   GE modifier added to this encounter      Tammie Gandhi DO 1/28/2022 11:01 AM

## 2022-01-28 NOTE — PROGRESS NOTES
[facility] Progress Note    NAME: Austin Cranker  DATE: 22  ROOM #: [id number]  : 1950  REASON FOR VISIT: Dementia and Seizures    CODE STATUS: DNR-CCA    History obtained from chart review. SUBJECTIVE:  HPI: Austin Cranker is a 70 y.o. female. Pt seen and examined at bedside. Hx unobtainable due to advanced dementia. Resting in bed this morning. Nursing notes reviewed. Interval Hx- No recent hospitalizations or ED visits. Seen for an acute visit last month for witnessed seizure. BMP, CBC, Valproic acid level were all within normal limits. No recent events. Continues to take depakote and lorazepam for seizures. No current complaints per nursing staff. Appears at baseline. I have reviewed patients past medical, surgical, social, and family history and have made updates where appropriate. Allergies and Medications were reviewed through the [facility] EMR. Patient Active Problem List    Diagnosis Date Noted    Encounter for PEG (percutaneous endoscopic gastrostomy) ([facility] 10/01/2021    Other dysphagia 10/01/2021    Muscle weakness 2021    Pneumonia due to infectious organism 2021    Coagulation defect ([facility] 2021    Schizophrenia ([facility] 2021    Late onset Alzheimer's disease with behavioral disturbance ([facility] 2019    Seizure disorder ([facility] 2019    Essential hypertension     Elevated troponin level 2015    Abnormal ECG 2015         Review of Systems  Unable to perform secondary to advanced dementia    PHYSICAL EXAM:  Vitals:    22 1031   BP: 103/72   Pulse: 69   Resp: 18   Temp: 98 °F (36.7 °C)   Weight: 142 lb (64.4 kg)     Body mass index is 25.15 kg/m².     VS Reviewed  General Appearance: chronically ill appearing female in no acute distress  Head: normocephalic and atraumatic  Eyes: conjunctivae and eye lids without erythema  Neck: supple and non-tender without mass, no thyromegaly or thyroid nodules, no cervical lymphadenopathy  Pulmonary/Chest:Transmitted upper airway sounds secondary to increased oral secretions at baseline. No wheezes or crackles. Cardiovascular: normal rate, regular rhythm, normal S1 and S2, no murmurs, rubs, clicks, or gallops, distal pulses intact  Abdomen: soft, non-tender, non-distended, G-tube present on the upper abdomen with no erythema or swelling noted. G-tube with no obvious obstruction. Extremities: no cyanosis, clubbing or edema of the lower extremities  Musculoskeletal: No joint swelling or gross deformity   Neuro:  Nonverbal, unable to follow commands secondary to dementia. Psych:  Unable to assess  Skin: warm and dry, no rash or erythema      ASSESSMENT & PLAN  1. Dementia with behavioral disturbance, unspecified [facility] type [facility]  -DNR CCA, End stage dementia.   -Continue lorazepam for behavioral issues  -Consider stopping Aricept at this time.  -Continue trazodone for sleep    2. Excessive oral secretions  -Continue scopolamine patch for excessive oral secretions. 3. Essential hypertension  -BP stable today.  -Continue furosemide 20 mg daily.  -Continue metoprolol 12.5 mg daily    4. Seizure disorder ([facility]  -Unclear cause for breakthrough seizure. Continue to monitor at this time. No recent events. Continue medications at current dosages.   -Continue valproic acid and lorazepam.    5. S/P percutaneous endoscopic gastrostomy (PEG) tube placement ([facility]  -G-tube present continue all meds and feeding through G-tube. -Continue n.p.o.  -Nutren 2.0 at 38 mL/h for 20 hours daily  -Continue routine G-tube care    6. Protein-calorie malnutrition, unspecified severity ([facility]  -Tube feedings as above.  Weight is stable.   -Routine G-tube care        Electronically signed by Tony Pa DO on 1/28/2022 at 10:38 AM

## 2022-02-25 ENCOUNTER — OUTSIDE SERVICES (OUTPATIENT)
Dept: FAMILY MEDICINE CLINIC | Age: 72
End: 2022-02-25

## 2022-02-25 VITALS
BODY MASS INDEX: 25.51 KG/M2 | HEART RATE: 72 BPM | TEMPERATURE: 97.5 F | WEIGHT: 144 LBS | SYSTOLIC BLOOD PRESSURE: 93 MMHG | DIASTOLIC BLOOD PRESSURE: 74 MMHG | RESPIRATION RATE: 14 BRPM

## 2022-02-25 DIAGNOSIS — R68.89 EXCESSIVE ORAL SECRETIONS: ICD-10-CM

## 2022-02-25 DIAGNOSIS — Z93.1 S/P PERCUTANEOUS ENDOSCOPIC GASTROSTOMY (PEG) TUBE PLACEMENT (HCC): ICD-10-CM

## 2022-02-25 DIAGNOSIS — G30.1 LATE ONSET ALZHEIMER'S DISEASE WITH BEHAVIORAL DISTURBANCE (HCC): Primary | ICD-10-CM

## 2022-02-25 DIAGNOSIS — E46 PROTEIN-CALORIE MALNUTRITION, UNSPECIFIED SEVERITY (HCC): ICD-10-CM

## 2022-02-25 DIAGNOSIS — I10 ESSENTIAL HYPERTENSION: ICD-10-CM

## 2022-02-25 DIAGNOSIS — F02.818 LATE ONSET ALZHEIMER'S DISEASE WITH BEHAVIORAL DISTURBANCE (HCC): Primary | ICD-10-CM

## 2022-02-25 DIAGNOSIS — G40.909 SEIZURE DISORDER (HCC): ICD-10-CM

## 2022-02-25 NOTE — PROGRESS NOTES
[facility] Progress Note    NAME: Linda Bailey  DATE: 22  ROOM #: 57A  : 1950  REASON FOR VISIT: Dementia    CODE STATUS: DNR-CCA    History obtained from chart review. SUBJECTIVE:  HPI: Linda Bailey is a 70 y.o. female. Pt seen and examined at bedside. Hx unobtainable due to advanced dementia. Resting comfortably in bed. Interval Hx- No recent hospitalizations for ED visits. Nursing notes reviewed. There was concern for possible aspiration of tube feeds, appears that this is just the increased thick white mucous that patient normally secretes orally. No concerns for pneumonia or changes at this time per nursing staff. Patient at baseline. I have reviewed patients past medical, surgical, social, and family history and have made updates where appropriate. Allergies and Medications were reviewed through the [facility] EMR. Patient Active Problem List    Diagnosis Date Noted    Protein-calorie malnutrition, unspecified severity ([facility] 2022    Encounter for PEG (percutaneous endoscopic gastrostomy) ([facility] 10/01/2021    Other dysphagia 10/01/2021    Muscle weakness 2021    Pneumonia due to infectious organism 2021    Coagulation defect ([facility] 2021    Schizophrenia ([facility] 2021    Late onset Alzheimer's disease with behavioral disturbance ([facility] 2019    Seizure disorder ([facility] 2019    Essential hypertension     Elevated troponin level 2015    Abnormal ECG 2015         Review of Systems  Unable to perform secondary to advanced dementia    PHYSICAL EXAM:  Vitals:    22 1055   BP: 93/74   Pulse: 72   Resp: 14   Temp: 97.5 °F (36.4 °C)   Weight: 144 lb (65.3 kg)     Body mass index is 25.51 kg/m².     VS Reviewed  General Appearance: chronically ill appearing female in no acute distress  Head: normocephalic and atraumatic  Eyes: conjunctivae and eye lids without erythema  Neck: supple and non-tender without mass, no

## 2022-02-25 NOTE — PROGRESS NOTES
S: 70 y.o. female with   Chief Complaint   Patient presents with    Dementia       ASSESSMENT & Michael Murguia was seen today for dementia. Diagnoses and all orders for this visit:    Late onset Alzheimer's disease with behavioral disturbance (HCC)    Excessive oral secretions    Essential hypertension    Protein-calorie malnutrition, unspecified severity ([facility]    S/P percutaneous endoscopic gastrostomy (PEG) tube placement [facility])    Seizure disorder ([facility]        No follow-ups on file. Plan per resident note    Attending Physician Statement  I have discussed the case, including pertinent history and exam findings with the resident. I agree with the documented assessment and plan as documented by the resident.   GE modifier added to this encounter      Elly Van DO 2/25/2022 11:31 AM

## 2022-03-25 ENCOUNTER — OUTSIDE SERVICES (OUTPATIENT)
Dept: FAMILY MEDICINE CLINIC | Age: 72
End: 2022-03-25
Payer: COMMERCIAL

## 2022-03-25 VITALS
RESPIRATION RATE: 14 BRPM | DIASTOLIC BLOOD PRESSURE: 74 MMHG | HEART RATE: 72 BPM | WEIGHT: 143 LBS | BODY MASS INDEX: 25.33 KG/M2 | SYSTOLIC BLOOD PRESSURE: 93 MMHG | TEMPERATURE: 97.6 F

## 2022-03-25 DIAGNOSIS — I10 ESSENTIAL HYPERTENSION: ICD-10-CM

## 2022-03-25 DIAGNOSIS — Z93.1 S/P PERCUTANEOUS ENDOSCOPIC GASTROSTOMY (PEG) TUBE PLACEMENT (HCC): ICD-10-CM

## 2022-03-25 DIAGNOSIS — E46 PROTEIN-CALORIE MALNUTRITION, UNSPECIFIED SEVERITY (HCC): ICD-10-CM

## 2022-03-25 DIAGNOSIS — F02.818 LATE ONSET ALZHEIMER'S DISEASE WITH BEHAVIORAL DISTURBANCE (HCC): Primary | ICD-10-CM

## 2022-03-25 DIAGNOSIS — G40.909 SEIZURE DISORDER (HCC): ICD-10-CM

## 2022-03-25 DIAGNOSIS — G30.1 LATE ONSET ALZHEIMER'S DISEASE WITH BEHAVIORAL DISTURBANCE (HCC): Primary | ICD-10-CM

## 2022-03-25 DIAGNOSIS — R68.89 EXCESSIVE ORAL SECRETIONS: ICD-10-CM

## 2022-03-25 PROCEDURE — 99308 SBSQ NF CARE LOW MDM 20: CPT | Performed by: FAMILY MEDICINE

## 2022-03-25 NOTE — PROGRESS NOTES
[facility] Progress Note    NAME: Liat Meneses  DATE: 22  ROOM #: [id number]  : 1950  REASON FOR VISIT: 1 Month Follow-Up    CODE STATUS: DNR-CCA    History obtained from chart review. SUBJECTIVE:  HPI: Liat Meneses is a 67 y.o. female. Pt seen and examined at bedside. Hx unobtainable due to advanced dementia. Resting comfortably in bed. Interval Hx- No recent hospitalizations or ED visits. No significant events since last month. Patient at baseline per nursing staff. No fevers. Tube feeds have been appropriate. I have reviewed patients past medical, surgical, social, and family history and have made updates where appropriate. Allergies and Medications were reviewed through the [facility] EMR. Patient Active Problem List    Diagnosis Date Noted    Protein-calorie malnutrition, unspecified severity ([facility] 2022    [facility]    Other dysphagia 10/01/2021    Muscle weakness 2021    Pneumonia due to infectious organism 2021    Coagulation defect ([facility] 2021    Schizophrenia ([facility] 2021    Late onset Alzheimer's disease with behavioral disturbance ([facility] 2019    Seizure disorder ([facility] 2019    Essential hypertension     Elevated troponin level 2015    Abnormal ECG 2015         Review of Systems  Unable to perform secondary to advanced dementia    PHYSICAL EXAM:  Vitals:    22 0833   BP: 93/74   Pulse: 72   Resp: 14   Temp: 97.6 °F (36.4 °C)   Weight: 143 lb (64.9 kg)     Body mass index is 25.33 kg/m².     VS Reviewed  General Appearance: chronically ill appearing female in no acute distress  Head: normocephalic and atraumatic  Eyes: conjunctivae and eye lids without erythema  Neck: supple and non-tender without mass, no thyromegaly or thyroid nodules, no cervical lymphadenopathy  Pulmonary/Chest:Transmitted upper airway sounds secondary to increased oral secretions

## 2022-03-25 NOTE — PROGRESS NOTES
S: 67 y.o. female with   Chief Complaint   Patient presents with    1 Month 101 Latoya Galvez was seen today for 1 month follow-up. Diagnoses and all orders for this visit:    Late onset Alzheimer's disease with behavioral disturbance (HCC)    Excessive oral secretions    Essential hypertension    Protein-calorie malnutrition, unspecified severity ([facility]    S/P percutaneous endoscopic gastrostomy (PEG) tube placement [facility])    Seizure disorder ([facility]        No follow-ups on file. Plan per resident note    Attending Physician Statement  I have discussed the case, including pertinent history and exam findings with the resident. I agree with the documented assessment and plan as documented by the resident.   GE modifier added to this encounter      Shante Reyna DO 3/25/2022 12:46 PM

## 2022-05-30 PROBLEM — R09.89 CHEST CONGESTION: Status: ACTIVE | Noted: 2022-05-30

## 2022-07-07 ENCOUNTER — HOSPITAL ENCOUNTER (EMERGENCY)
Age: 72
Discharge: HOME | End: 2022-07-07
Attending: EMERGENCY MEDICINE
Payer: COMMERCIAL

## 2022-07-07 VITALS
RESPIRATION RATE: 26 BRPM | OXYGEN SATURATION: 93 % | TEMPERATURE: 97.7 F | DIASTOLIC BLOOD PRESSURE: 91 MMHG | HEART RATE: 83 BPM | SYSTOLIC BLOOD PRESSURE: 138 MMHG

## 2022-07-07 DIAGNOSIS — K94.23 PEG TUBE MALFUNCTION (HCC): Primary | ICD-10-CM

## 2022-07-07 PROCEDURE — 99283 EMERGENCY DEPT VISIT LOW MDM: CPT

## 2022-07-07 NOTE — ED NOTES
Patient resting in bed. Respirations easy and unlabored. No distress noted. Call light within reach.        Valery Beltre RN  07/07/22 4442

## 2022-07-07 NOTE — ED NOTES
Pt resting in cot. Call light with in reach. Breathing even and unlabored.       Rubia Mallory RN  07/07/22 3005

## 2022-07-07 NOTE — PROGRESS NOTES
3396 pt arrived to floor via cart per transport. Dr. Fabrizio Adame at bedside to assess pt. End of peg tube cap replaced and flushed with 60 mls of normal saline per physician.

## 2022-07-07 NOTE — ED NOTES
Patient to ED via EMS from [facility] to have peg tube replaced. Nursing staff reports peg tube has hole. Patient is a total dependant unable to talk or swallow.      Sneha Lynch RN  07/07/22 7712

## 2022-07-07 NOTE — ED PROVIDER NOTES
[facility] ENCOUNTER        Pt Name: Real Crews  MRN: [id number]  [place] 1950  Date of evaluation: 7/7/2022  Treating Resident Physician: Kirsten Dakin, DO  Supervising Physician: Chai Cruz    History obtained from the patient. CHIEF COMPLAINT     No chief complaint on file. PEG tube malfunction. HISTORY OF PRESENT ILLNESS    HPI  Real Crews is a 67 y.o. female who presents to the emergency department for evaluation of Peg tube malfunction. Patient is nonverbal and presenting from [facility] to have PEG tube replaced. Nursing staff reported that the PEG tube is no longer functioning and leaking. Ms. Jessie Hensley is completely dependent unable to talk or swallow. The patient has no other acute complaints at this time. REVIEW OF SYSTEMS   Review of Systems   Unable to perform ROS: Patient nonverbal         PAST MEDICAL AND SURGICAL HISTORY     Past Medical History:   Diagnosis Date    Alzheimer disease ([address]     Anxiety     Aphasia     Bipolar 2 disorder ([address]     CAD (coronary artery disease)     Cerebral artery occlusion with cerebral infarction ([address]     Chronic pain     Chronic UTI     Contracture of joint of shoulder region, left     Dementia ([address]     Depression     Dysphagia     GERD (gastroesophageal reflux disease)     Hyperlipidemia     Hypertension     Psychiatric problem     Seizures ([address]      Past Surgical History:   Procedure Laterality Date    GASTROSTOMY TUBE PLACEMENT N/A 2/17/2021    EGD PEG TUBE PLACEMENT performed by Flynn Rodriguez MD at [address] [place] [address]   No current facility-administered medications for this encounter.     Current Outpatient Medications:     midodrine (PROAMATINE) 5 MG tablet, 1 tablet by PEG Tube route 3 times daily (with meals), Disp: 90 tablet, Rfl: 3    donepezil (ARICEPT) 10 MG tablet, 10 mg by Per G Tube route nightly , Disp: , Rfl:     furosemide (LASIX) 10 MG/ML solution, 20 mg by Per G Tube route daily , Disp: , Rfl:     famotidine (PEPCID) 40 MG/5ML suspension, 20 mg by Per G Tube route daily , Disp: , Rfl:     scopolamine (TRANSDERM-SCOP) transdermal patch, Place 1 patch onto the skin every 72 hours, Disp: , Rfl:     senna (SENOKOT) 8.6 MG tablet, 1 tablet by Per G Tube route daily , Disp: , Rfl:     traZODone (DESYREL) 100 MG tablet, 100 mg by Per G Tube route nightly , Disp: , Rfl:     acetaminophen (TYLENOL) 500 MG tablet, 1,000 mg by Per G Tube route 3 times daily, Disp: , Rfl:     ipratropium-albuterol (DUONEB) 0.5-2.5 (3) MG/3ML SOLN nebulizer solution, Inhale 1 vial into the lungs every 4 hours as needed for Shortness of Breath, Disp: , Rfl:     aspirin 81 MG chewable tablet, 81 mg by Per G Tube route daily, Disp: , Rfl:     valproic acid (DEPACON) 250 MG/5ML SOLN oral solution, 1,000 mg by Per G Tube route every 8 hours , Disp: , Rfl:     metoprolol (LOPRESSOR) 25 MG tablet, 12.5 mg by Per G Tube route daily Hold if SBP < 110 or HR < 55, Disp: , Rfl:     acetaminophen (TYLENOL) 325 MG tablet, 650 mg by Per G Tube route every 4 hours as needed for Pain or Fever , Disp: , Rfl:       SOCIAL HISTORY     Social History     Social History Narrative    Not on file     Social History     Tobacco Use    Smoking status: Former Smoker     Types: Cigarettes    Smokeless tobacco: Never Used   Vaping Use    Vaping Use: Not on file   Substance Use Topics    Alcohol use: No    Drug use: Yes     Types: Marijuana (Weed)         ALLERGIES     Allergies   Allergen Reactions    Pcn [Penicillins]          FAMILY HISTORY     Family History   Problem Relation Age of Onset    Other Mother          PREVIOUS RECORDS   Previous records reviewed: Patient last seen in August 2021 for pneumonia. Katia Stands         PHYSICAL EXAM     ED Triage Vitals [07/07/22 0722]   BP Temp Temp Source Heart Rate Resp SpO2 Height Weight   (!) 138/91 97.7 °F (36.5 °C) Axillary 83 26 93 % -- --     Initial vital signs and nursing assessment reviewed and normal. There is no height or weight on file to calculate BMI. Pulsoximetry is normal per my interpretation. Additional Vital Signs:  Vitals:    07/07/22 0722   BP: (!) 138/91   Pulse: 83   Resp: 26   Temp: 97.7 °F (36.5 °C)   SpO2: 93%       Physical Exam  Constitutional:       General: She is not in acute distress. Appearance: She is not ill-appearing, toxic-appearing or diaphoretic. HENT:      Mouth/Throat:      Mouth: Mucous membranes are moist.      Pharynx: Oropharynx is clear. No oropharyngeal exudate or posterior oropharyngeal erythema. Eyes:      General: No scleral icterus. Right eye: No discharge. Left eye: No discharge. Extraocular Movements: Extraocular movements intact. Conjunctiva/sclera: Conjunctivae normal.   Cardiovascular:      Rate and Rhythm: Normal rate and regular rhythm. Pulses: Normal pulses. Heart sounds: No murmur heard. No friction rub. No gallop. Pulmonary:      Effort: Pulmonary effort is normal.      Breath sounds: No wheezing, rhonchi or rales. Abdominal:      General: There is no distension. Palpations: Abdomen is soft. Tenderness: There is no abdominal tenderness. There is no guarding or rebound. Comments: Appropriately placed G-tube without evidence of infection. Distal double port is transected. Unable to remove with bumper end with gentle traction. Skin:     General: Skin is warm and dry. Capillary Refill: Capillary refill takes less than 2 seconds. Neurological:      General: No focal deficit present. Mental Status: She is alert. Mental status is at baseline. MEDICAL DECISION MAKING   Initial Assessment:   1. Chronically ill 19-year-old female who is DNR CC presenting for PEG to malfunction.   She has a bumper to 20 [facility] G-tube that is transected next to the double-lumen top.  There is no purulent drainage or discharge around the area. Abdomen is nonperitoneal and nondistended. Unsure of the cause of transection. Plan:    Attempt withdrawal in the ED   Contact patient's gastroenterologist if necessary for replacement   Discharge back to nursing home        ED RESULTS   Laboratory results:  Labs Reviewed - No data to display    Radiologic studies results:  No orders to display       ED Medications administered this visit: Medications - No data to display      ED COURSE     ED Course as of 07/07/22 0913   Thu Jul 07, 2022 2001 Suresh Marte Spoke with patient's gastroenterologist Dr. Rojas Rebolledo who requested the patient be sent up to the endoscopy suite for G-tube replacement. I spoke with both endoscopy and surgery to get Ms. Sahni on the schedule. They are working on that at this time and will call back with details regarding transfer and schedule. [EM]   0908 Patient returned from endoscopy suite with new PEG tube [EM]      ED Course User Index  [EM] Mio Moore DO         Strict return precautions and follow up instructions were discussed with the patient prior to discharge, with which the patient agrees. MEDICATION CHANGES     Current Discharge Medication List            FINAL DISPOSITION     Final diagnoses:   PEG tube malfunction ([address]     Condition: condition: stable  Dispo: Discharge to nursing home      This transcription was electronically signed. Parts of this transcriptions may have been dictated by use of voice recognition software and electronically transcribed, and parts may have been transcribed with the assistance of an ED scribe. The transcription may contain errors not detected in proofreading. Please refer to my supervising physician's documentation if my documentation differs.     Electronically Signed: Mio Moore DO, 07/07/22, 9:13 AM       Mio Moore DO  Resident  07/07/22 6873

## 2022-07-07 NOTE — ED NOTES
Reassessment of the patients peg tube has moderately improved, the patients pain reassessment is a 0/10, Side rails up times 2, call light in reach, will continue to monitor.      Pipo Shipley RN  07/07/22 1007

## 2022-08-16 ENCOUNTER — ANESTHESIA EVENT (OUTPATIENT)
Dept: ENDOSCOPY | Age: 72
End: 2022-08-16
Payer: COMMERCIAL

## 2022-08-16 PROBLEM — G30.1 LATE ONSET ALZHEIMER'S DISEASE WITH BEHAVIORAL DISTURBANCE (HCC): Chronic | Status: RESOLVED | Noted: 2019-04-30 | Resolved: 2022-08-16

## 2022-08-16 PROBLEM — J18.9 PNEUMONIA DUE TO INFECTIOUS ORGANISM: Status: RESOLVED | Noted: 2021-07-18 | Resolved: 2022-08-16

## 2022-08-16 PROBLEM — R09.89 CHEST CONGESTION: Status: RESOLVED | Noted: 2022-05-30 | Resolved: 2022-08-16

## 2022-08-16 PROBLEM — D68.9 COAGULATION DEFECT (HCC): Status: RESOLVED | Noted: 2021-06-28 | Resolved: 2022-08-16

## 2022-08-16 PROBLEM — Z43.1 ENCOUNTER FOR PEG (PERCUTANEOUS ENDOSCOPIC GASTROSTOMY) (HCC): Status: RESOLVED | Noted: 2021-10-01 | Resolved: 2022-08-16

## 2022-08-16 PROBLEM — Z78.9 ON TUBE FEEDING DIET: Status: ACTIVE | Noted: 2022-08-16

## 2022-08-16 PROBLEM — Z93.1 S/P PERCUTANEOUS ENDOSCOPIC GASTROSTOMY (PEG) TUBE PLACEMENT (HCC): Status: ACTIVE | Noted: 2022-08-16

## 2022-08-16 PROBLEM — M62.81 MUSCLE WEAKNESS: Status: RESOLVED | Noted: 2021-09-26 | Resolved: 2022-08-16

## 2022-08-16 PROBLEM — F02.818 LATE ONSET ALZHEIMER'S DISEASE WITH BEHAVIORAL DISTURBANCE (HCC): Chronic | Status: RESOLVED | Noted: 2019-04-30 | Resolved: 2022-08-16

## 2022-08-16 PROBLEM — K21.9 GERD (GASTROESOPHAGEAL REFLUX DISEASE): Status: ACTIVE | Noted: 2022-08-16

## 2022-08-16 PROBLEM — K21.9 GERD (GASTROESOPHAGEAL REFLUX DISEASE): Chronic | Status: ACTIVE | Noted: 2022-08-16

## 2022-08-16 PROBLEM — F20.9 SCHIZOPHRENIA (HCC): Chronic | Status: ACTIVE | Noted: 2021-01-21

## 2022-08-16 PROBLEM — R13.19 OTHER DYSPHAGIA: Status: RESOLVED | Noted: 2021-10-01 | Resolved: 2022-08-16

## 2022-08-17 ENCOUNTER — ANESTHESIA (OUTPATIENT)
Dept: ENDOSCOPY | Age: 72
End: 2022-08-17
Payer: COMMERCIAL

## 2022-08-17 ENCOUNTER — HOSPITAL ENCOUNTER (OUTPATIENT)
Age: 72
Setting detail: OUTPATIENT SURGERY
Discharge: NURSING, REHAB OR LONG-TERM CARE | End: 2022-08-17
Attending: INTERNAL MEDICINE | Admitting: INTERNAL MEDICINE
Payer: COMMERCIAL

## 2022-08-17 VITALS
TEMPERATURE: 97.8 F | SYSTOLIC BLOOD PRESSURE: 125 MMHG | WEIGHT: 137.9 LBS | HEART RATE: 94 BPM | DIASTOLIC BLOOD PRESSURE: 80 MMHG | RESPIRATION RATE: 16 BRPM | OXYGEN SATURATION: 95 % | BODY MASS INDEX: 24.43 KG/M2

## 2022-08-17 PROCEDURE — 6360000002 HC RX W HCPCS

## 2022-08-17 PROCEDURE — 2709999900 HC NON-CHARGEABLE SUPPLY: Performed by: INTERNAL MEDICINE

## 2022-08-17 PROCEDURE — 2500000003 HC RX 250 WO HCPCS

## 2022-08-17 PROCEDURE — 7100000011 HC PHASE II RECOVERY - ADDTL 15 MIN: Performed by: INTERNAL MEDICINE

## 2022-08-17 PROCEDURE — 2580000003 HC RX 258: Performed by: INTERNAL MEDICINE

## 2022-08-17 PROCEDURE — 3700000001 HC ADD 15 MINUTES (ANESTHESIA): Performed by: INTERNAL MEDICINE

## 2022-08-17 PROCEDURE — 3700000000 HC ANESTHESIA ATTENDED CARE: Performed by: INTERNAL MEDICINE

## 2022-08-17 PROCEDURE — 7100000010 HC PHASE II RECOVERY - FIRST 15 MIN: Performed by: INTERNAL MEDICINE

## 2022-08-17 PROCEDURE — 43762 RPLC GTUBE NO REVJ TRC: CPT | Performed by: INTERNAL MEDICINE

## 2022-08-17 RX ORDER — LIDOCAINE HYDROCHLORIDE 20 MG/ML
INJECTION, SOLUTION EPIDURAL; INFILTRATION; INTRACAUDAL; PERINEURAL PRN
Status: DISCONTINUED | OUTPATIENT
Start: 2022-08-17 | End: 2022-08-17 | Stop reason: SDUPTHER

## 2022-08-17 RX ORDER — SODIUM CHLORIDE 9 MG/ML
25 INJECTION, SOLUTION INTRAVENOUS PRN
Status: DISCONTINUED | OUTPATIENT
Start: 2022-08-17 | End: 2022-08-17 | Stop reason: HOSPADM

## 2022-08-17 RX ORDER — PROPOFOL 10 MG/ML
INJECTION, EMULSION INTRAVENOUS PRN
Status: DISCONTINUED | OUTPATIENT
Start: 2022-08-17 | End: 2022-08-17 | Stop reason: SDUPTHER

## 2022-08-17 RX ADMIN — LIDOCAINE HYDROCHLORIDE 60 MG: 20 INJECTION, SOLUTION EPIDURAL; INFILTRATION; INTRACAUDAL; PERINEURAL at 08:28

## 2022-08-17 RX ADMIN — SODIUM CHLORIDE 25 ML: 9 INJECTION, SOLUTION INTRAVENOUS at 07:43

## 2022-08-17 RX ADMIN — LIDOCAINE HYDROCHLORIDE 40 MG: 20 INJECTION, SOLUTION EPIDURAL; INFILTRATION; INTRACAUDAL; PERINEURAL at 08:33

## 2022-08-17 RX ADMIN — PROPOFOL 110 MG: 10 INJECTION, EMULSION INTRAVENOUS at 08:28

## 2022-08-17 ASSESSMENT — PAIN SCALES - GENERAL
PAINLEVEL_OUTOF10: 0
PAINLEVEL_OUTOF10: 0

## 2022-08-17 ASSESSMENT — PAIN - FUNCTIONAL ASSESSMENT: PAIN_FUNCTIONAL_ASSESSMENT: NONE - DENIES PAIN

## 2022-08-17 NOTE — DISCHARGE INSTRUCTIONS
Ok to start tube feeding in 2 hours  ASPIRATION PRECAUTIONS  PLEASE CALL IF ANY PROBLEMS WITH G TUBE NEEDS REMOVED OR REPLACED .

## 2022-08-17 NOTE — BRIEF OP NOTE
Brief Postoperative Note      Patient: Manjit Smith  YOB: 1950  MRN: [id number]    Date of Procedure: 8/17/2022    Pre-Op Diagnosis: Malfunction of gastrostomy tube ([facility] [K94.23]    Post-Op Diagnosis:  WEINSTEIN CATHETER REMOVED 20F PONSKY  G TUBE PLACED. Procedure(s):  EGD WITH GASTROSTOMY TUBE CHANGE    Surgeon(s):  Jonas Henao MD    Assistant:  * No surgical staff found *    Anesthesia: Monitor Anesthesia Care    Estimated Blood Loss (mL): Minimal    Complications: None    Specimens:   * No specimens in log *    Implants:  * No implants in log *      Drains:   Gastrostomy/Enterostomy/Jejunostomy (Active)       Findings: WEINSTEIN CATHETER REMOVED 20F PONSKY  G TUBE PLACED.     Electronically signed by Jonas Henao MD on 8/17/2022 at 9:16 AM

## 2022-08-17 NOTE — OP NOTE
[address] [place] [address]                                OPERATIVE REPORT    PATIENT NAME: Tanmay Urban                      :        1950  MED REC NO:   [id number]                           ROOM:  ACCOUNT NO:   [de-identified]                           ADMIT DATE: 2022  PROVIDER:     HENRY Riosmohit Hineser OF PROCEDURE:  2022    SURGEON:  Levi Evans MD    PROCEDURE:  EGD with placement of the G-tube. INDICATION:  A 67year-old pleasant female who had difficulty  swallowing. The patient had Alzheimer's dementia and she had a Ndiaye  catheter that was placed in the nursing home. She is now undergoing  replacement with regular G-tube. Before she had replacement G-tube was  placed but nursing home somehow able to get the replacement G-tube out. For that reason, I elected to use the original G-tube with large bumper  difficult to displace. Please see my brief history for details and for  physical examination. ASA CLASSIFICATION:  As per Anesthesia. Please see Anesthesia note for  details. INSTRUMENT:  JFJ- gastroscope, 20-German Ponsky G-tube. PHOTOGRAPHS:  Yes. BIOPSIES:  No.    ESTIMATED BLOOD LOSS:  Less than 10 mL. CONSENT:  Procedure indications and complications including but not  limited to perforation, bleeding, infection, adverse reaction to  medicine, very slight chance of missing significant lesions discussed  with the patient, family members, and a written consent was obtained. DESCRIPTION OF THE PROCEDURE:  The patient was placed on supine  position. Oropharynx was sprayed with Cetacaine spray and bite block  was placed between maxilla and mandible. After the adequate total  intravenous anesthesia was given by Anesthesia, the GIF-  gastroscope was inserted into the oropharynx and the esophagus was  intubated under direct vision.   The scope was advanced down through the  stomach into second portion of duodenum. On careful inspection and on  withdrawal of the scope, the duodenum looked normal as shown in picture  #A5. Antrum of the stomach looked normal.  In the body of the stomach,  the patient had Ndiaye catheter bumper was noted as shown in picture #A3,  and body of the stomach looked normal.  Esophagus looked normal, and the  bumper was deflated. Ndiaye catheter was removed. Guidewire was placed  through the gastrostomy opening. Grasping forceps was advanced through  the scope, the guidewire was grabbed with snare. The guidewire,  grasping forceps, and gastroscope removed out through the oropharynx as  unit. 20-[place] G-tube was placed over the guidewire by gentle pull  method. Internal bumper was secured in good position as shown in  picture #A7. External bumper was placed at 5 cm at the skin. Triple  antibiotic ointment applied. No immediate complications noted. The  patient was transferred to the recovery room in stable condition. IMPRESSION:  20-[place] Bard G-tube was placed. Old Ndiaye catheter was  removed. Triple antibiotic ointment was placed and abdominal binder was  applied. RECOMMENDATIONS:  Start the G-tube feedings in two hours. Aspiration  precautions. Please call me if she has any problem with G-tube need to  be removed or replaced. Julisa Jackson M.D.    D: 08/17/2022 9:05:26       T: 08/17/2022 9:07:44     [email or web address]  Job#: [id number]     Doc#: [id number]    CC:  FABI Mcclure M.D.

## 2022-08-17 NOTE — H&P
[address] [place] [address]                       PREOPERATIVE HISTORY AND PHYSICAL    PATIENT NAME: Romero Franklin                      :        1950  MED REC NO:   [id number]                           ROOM:  ACCOUNT NO:   [de-identified]                           ADMIT DATE: 2022  PROVIDER:     Marta Thibodeaux M.D. PROCEDURE:  G-tube replacement. INDICATION:  The patient is a 79-year-old pleasant female who has  dysphagia. The patient had G-tube placed in the nursing home. G-tube  was removed and put the Ndiaye catheter. She is undergoing replacement  G-tube. PAST MEDICAL HISTORY:  Alzheimer's dementia, arthritis, coronary artery  disease, cerebral artery occlusion with cerebral infarction, chronic  pain, dysphagia, gastroesophageal reflux disease, hypertension,  hyperlipidemia, schizophrenia, seizure disorder. PAST SURGICAL HISTORY:  Gastrostomy tube placement, tonsillectomy, tubal  ligation. MEDICATIONS:  Reviewed. PHYSICAL EXAMINATION:  VITAL SIGNS:  Temperature 96, pulse 82, respiratory rate 18, blood  pressure 130/96. HEART:  Regular. Normal S1 and S2. No S3.  LUNGS:  [address] air entry bilaterally. ABDOMEN:  Ndiaye catheter in place. IMPRESSION:  A 67year-old pleasant female who has dysphagia. She is  undergoing G-tube replacement. RECOMMENDATIONS:  Keep the patient nothing by mouth, proceed with EGD  and replacement G-tube. I have discussed with the patient's family  regarding the G-tube removal and replacement G-tube, its risks and  benefits. They expressed their understanding.         Jose David Fleming M.D.    D: 2022 8:25:02       T: 2022 8:27:24     [email or web address]  Job#: [id number]     Doc#: [id number]    CC:

## 2022-08-17 NOTE — ANESTHESIA PRE PROCEDURE
Department of Anesthesiology  Preprocedure Note       Name:  Sagrario Peñaloza   Age:  67 y.o.  :  1950                                          MRN:  [id number]         Date:  2022      Surgeon: Xena Waldron):  Paula Salguero MD    Procedure: Procedure(s):  EGD WITH GASTROSTOMY TUBE CHANGE    Medications prior to admission:   Prior to Admission medications    Medication Sig Start Date End Date Taking?  Authorizing Provider   midodrine (PROAMATINE) 5 MG tablet 1 tablet by PEG Tube route 3 times daily (with meals) 21   Rula Anderson MD   donepezil (ARICEPT) 10 MG tablet 10 mg by Per G Tube route nightly     Historical Provider, MD   furosemide (LASIX) 10 MG/ML solution 20 mg by Per G Tube route daily     Historical Provider, MD   famotidine (PEPCID) 40 MG/5ML suspension 20 mg by Per G Tube route daily     Historical Provider, MD   scopolamine (TRANSDERM-SCOP) transdermal patch Place 1 patch onto the skin every 72 hours    Historical Provider, MD   senna (SENOKOT) 8.6 MG tablet 1 tablet by Per G Tube route daily     Historical Provider, MD   traZODone (DESYREL) 100 MG tablet 100 mg by Per G Tube route nightly     Historical Provider, MD   acetaminophen (TYLENOL) 500 MG tablet 1,000 mg by Per G Tube route 3 times daily    Historical Provider, MD   ipratropium-albuterol (DUONEB) 0.5-2.5 (3) MG/3ML SOLN nebulizer solution Inhale 1 vial into the lungs every 4 hours as needed for Shortness of Breath    Historical Provider, MD   aspirin 81 MG chewable tablet 81 mg by Per G Tube route daily    Historical Provider, MD   valproic acid (DEPACON) 250 MG/5ML SOLN oral solution 1,000 mg by Per G Tube route every 8 hours     Historical Provider, MD   metoprolol (LOPRESSOR) 25 MG tablet 12.5 mg by Per G Tube route daily Hold if SBP < 110 or HR < 55    Historical Provider, MD   acetaminophen (TYLENOL) 325 MG tablet 650 mg by Per G Tube route every 4 hours as needed for Pain or Fever     Historical Provider, MD       Current medications:    No current facility-administered medications for this encounter. Allergies: Allergies   Allergen Reactions    Pcn [Penicillins]        Problem List:    Patient Active Problem List   Diagnosis Code    Alzheimer's dementia ([facility] G30.9, F02.80    Seizure disorder ([facility] G40.909    Schizophrenia ([facility] F20.9    Protein-calorie malnutrition, unspecified severity ([facility] E46    Dysphagia R13.10    Excessive oral secretions R68.89    Hypotension I95.9    History of hypertension Z86.79    Edema R60.9    Chronic pain G89.29    GERD (gastroesophageal reflux disease) K21.9    On tube feeding diet Z78.9    S/P percutaneous endoscopic gastrostomy (PEG) tube placement ([facility] Z93.1       Past Medical History:        Diagnosis Date    Alzheimer's dementia ([facility]     Chronic pain     Contracture of joint of shoulder region, left     Dysphagia     GERD (gastroesophageal reflux disease)     History of hypertension     Hypotension     Schizophrenia ([facility] 01/21/2021    Seizure disorder ([facility] 04/30/2019       Past Surgical History:        Procedure Laterality Date    GASTROSTOMY TUBE PLACEMENT N/A 2/17/2021    EGD PEG TUBE PLACEMENT performed by Lady Valera MD at [facility]    TONSILLECTOMY      TUBAL LIGATION         Social History:    Social History     Tobacco Use    Smoking status: Former     Types: Cigarettes    Smokeless tobacco: Never   Substance Use Topics    Alcohol use: No                                Counseling given: Not Answered      Vital Signs (Current): There were no vitals filed for this visit.                                            BP Readings from Last 3 Encounters:   07/11/22 122/63   07/07/22 (!) 138/91   06/24/22 122/63       NPO Status:                                                                                 BMI:   Wt Readings from Last 3 Encounters:   07/11/22 156 lb 6.4 oz (70.9 kg)   06/24/22 154 lb (69.9 kg)   06/17/22 146 lb (66.2 kg) There is no height or weight on file to calculate BMI.    CBC:   Lab Results   Component Value Date/Time    WBC 17.3 08/10/2021 05:42 PM    RBC 4.15 08/10/2021 05:42 PM    HGB 12.6 08/10/2021 05:42 PM    HCT 40.6 08/10/2021 05:42 PM    MCV 97.8 08/10/2021 05:42 PM    RDW 14.2 06/13/2017 08:25 AM     08/10/2021 05:42 PM       CMP:   Lab Results   Component Value Date/Time     08/10/2021 05:42 PM    K 4.8 08/10/2021 05:42 PM    K 3.9 07/19/2021 06:52 AM     08/10/2021 05:42 PM    CO2 28 08/10/2021 05:42 PM    BUN 23 08/10/2021 05:42 PM    CREATININE 0.8 08/10/2021 05:42 PM    LABGLOM 85 08/10/2021 05:42 PM    GLUCOSE 135 08/10/2021 05:42 PM    PROT 7.8 08/10/2021 05:42 PM    CALCIUM 9.8 08/10/2021 05:42 PM    BILITOT 0.4 08/10/2021 05:42 PM    ALKPHOS 64 08/10/2021 05:42 PM    AST 16 08/10/2021 05:42 PM    ALT <5 08/10/2021 05:42 PM       POC Tests: No results for input(s): POCGLU, POCNA, POCK, POCCL, POCBUN, POCHEMO, POCHCT in the last 72 hours.     Coags:   Lab Results   Component Value Date/Time    INR 1.21 07/18/2021 04:05 AM    APTT 35.0 07/18/2021 04:05 AM       HCG (If Applicable): No results found for: PREGTESTUR, PREGSERUM, HCG, HCGQUANT     ABGs: No results found for: PHART, PO2ART, OCU2HTD, SEG4OQC, BEART, G2GCJIRH     Type & Screen (If Applicable):  No results found for: LABABO, LABRH    Drug/Infectious Status (If Applicable):  No results found for: HIV, HEPCAB    COVID-19 Screening (If Applicable):   Lab Results   Component Value Date/Time    COVID19 NOT DETECTED 07/21/2021 06:25 PM           Anesthesia Evaluation  Nursing notes reviewed  Airway: Mallampati: Unable to assess / NA  TM distance: >3 FB     Comment: Patient unable to follow commands, KAYLEE mouth opening      Dental:          Pulmonary:Negative Pulmonary ROS   (+) decreased breath sounds                            Cardiovascular:    (+) hypertension:,       ECG reviewed      Echocardiogram reviewed         Beta Blocker:  Dose within 24 Hrs         Neuro/Psych:   (+) psychiatric history:dementia            GI/Hepatic/Renal:   (+) GERD:,           Endo/Other: Negative Endo/Other ROS                    Abdominal:             Vascular: negative vascular ROS. Other Findings:           Anesthesia Plan      MAC     ASA 3       Induction: intravenous. Anesthetic plan and risks discussed with legal guardian (Discussed plan of care with patient's legal guardian Jade Kwan). Dual sign-off for anesthesia consent obtained. ). Plan discussed with attending.     Attending anesthesiologist reviewed and agrees with Preprocedure content                ALBINO Woodson - CRNA   8/17/2022

## 2022-08-17 NOTE — PROGRESS NOTES
Scope . EGD completed. Previous PEG TUBE removed. PEG tube inserted 20FR per  [facility].  ABD binder placed

## 2022-08-17 NOTE — PROGRESS NOTES
Meli Robertson admitted to endoscopy for an EGD with gastrostomy tube replacement Procedure verified and consent signed per telephone consent.

## 2022-08-17 NOTE — ANESTHESIA POSTPROCEDURE EVALUATION
Department of Anesthesiology  Postprocedure Note    Patient: Rose Mary Perea  MRN: [id number]  YOB: 1950  Date of evaluation: 8/17/2022      Procedure Summary     Date: 08/17/22 Room / Location: [address] [place] [address] / [address] [place] [address]    Anesthesia Start: 4917 Anesthesia Stop: 0848    Procedure: EGD WITH GASTROSTOMY TUBE CHANGE Diagnosis:       Malfunction of gastrostomy tube ([address]      (Malfunction of gastrostomy tube ([address] [C66.97])    Surgeons: El Evans MD Responsible Provider: Cheryl Wilkerson DO    Anesthesia Type: MAC ASA Status: 3          Anesthesia Type: No value filed. Jared Phase I: Jared Score: 8    Jared Phase II:        Anesthesia Post Evaluation    Patient location: Endoscopy.   Patient participation: waiting for patient participation  Level of consciousness: sleepy but conscious  Airway patency: patent  Nausea & Vomiting: no nausea and no vomiting  Complications: no  Cardiovascular status: blood pressure returned to baseline and hemodynamically stable  Respiratory status: acceptable, room air and spontaneous ventilation  Hydration status: stable

## 2022-11-03 PROBLEM — R60.0 BILATERAL LEG EDEMA: Status: ACTIVE | Noted: 2022-11-03

## 2022-11-04 PROBLEM — R60.0 BILATERAL LEG EDEMA: Status: RESOLVED | Noted: 2022-11-03 | Resolved: 2022-11-04

## 2022-12-23 ENCOUNTER — APPOINTMENT (OUTPATIENT)
Dept: GENERAL RADIOLOGY | Age: 72
End: 2022-12-23
Payer: COMMERCIAL

## 2022-12-23 ENCOUNTER — HOSPITAL ENCOUNTER (EMERGENCY)
Age: 72
Discharge: HOME | End: 2022-12-23
Attending: EMERGENCY MEDICINE
Payer: COMMERCIAL

## 2022-12-23 VITALS
HEART RATE: 106 BPM | SYSTOLIC BLOOD PRESSURE: 142 MMHG | TEMPERATURE: 98.2 F | DIASTOLIC BLOOD PRESSURE: 112 MMHG | BODY MASS INDEX: 31.64 KG/M2 | WEIGHT: 173 LBS | RESPIRATION RATE: 18 BRPM | OXYGEN SATURATION: 97 %

## 2022-12-23 DIAGNOSIS — Z46.59 ENCOUNTER FOR FEEDING TUBE PLACEMENT: Primary | ICD-10-CM

## 2022-12-23 PROCEDURE — 6360000004 HC RX CONTRAST MEDICATION: Performed by: STUDENT IN AN ORGANIZED HEALTH CARE EDUCATION/TRAINING PROGRAM

## 2022-12-23 PROCEDURE — 49465 FLUORO EXAM OF G/COLON TUBE: CPT

## 2022-12-23 PROCEDURE — 99283 EMERGENCY DEPT VISIT LOW MDM: CPT

## 2022-12-23 RX ADMIN — DIATRIZOATE MEGLUMINE AND DIATRIZOATE SODIUM 30 ML: 660; 100 LIQUID ORAL; RECTAL at 11:36

## 2022-12-23 RX ADMIN — DIATRIZOATE MEGLUMINE AND DIATRIZOATE SODIUM 30 ML: 600; 100 SOLUTION ORAL; RECTAL at 13:36

## 2022-12-23 ASSESSMENT — PAIN - FUNCTIONAL ASSESSMENT: PAIN_FUNCTIONAL_ASSESSMENT: NONE - DENIES PAIN

## 2022-12-23 NOTE — ED NOTES
Transport is set up with [facility] for patient to go back to [facility].       Joana Guo, RN  12/23/22 6341

## 2022-12-23 NOTE — ED NOTES
New g-tube is placed and secured down. X-ray will be repeated.       Britt Bocanegra RN  12/23/22 2506

## 2022-12-23 NOTE — ED PROVIDER NOTES
[address] \[facility]\"" [address] [address] EMERGENCY DEPT  ED Attending Physician Attestation     I performed a history and physical examination of the patient and discussed management with the Resident. I reviewed the Resident's note and agree with the documented findings and plan of care. Any areas of disagreement are noted on the chart. I was personally present for the key portions of any procedures and have documented in the chart those procedures where I was not present during the key portions. I have reviewed the emergency nurses triage note and agree with the chief complaint, past medical history, past surgical history, allergies, medications, social and family history as documented unless otherwise noted below.     Displaced G tube  Tract mature  Initially replaced w/ Ndiaye  Subsequentlywith Gtube (once right size located)  Placement confirmed both times w/ [address] , DO, [address] [facility], [place], operated by [facility],[address]  Attending Emergency Physician       Zack Up DO  12/23/22 3675

## 2022-12-23 NOTE — DISCHARGE INSTRUCTIONS
Continue using medications as prescribed  Return to the ED if you notice significant swelling, redness, drainage or discharged from feeding tube site or if you are having severe abdominal pain, distension/swelling, or fever.   Follow up with your family doctor within a week

## 2022-12-23 NOTE — ED PROVIDER NOTES
[place] ENCOUNTER          Pt Name: Milan Fuller  MRN: [id number]  [place] 1950  Date of evaluation: 12/23/2022  Treating Resident Physician: Jamar Santamaria MD  Supervising Physician: Dr. Princess Biswas, [address] [place] [address]       Chief Complaint   Patient presents with    Feeding Tube Problem     Peg tube fell out     History obtained from nursing staff. HISTORY OF PRESENT ILLNESS    HPI  Milan Fuller is a 67 y.o. female who presents to the emergency department for evaluation of PEG tube removal.  Patient has Alzheimer's dementia and is unable to participate in her care. Per nursing staff patient is at baseline mental status and is nonverbal at baseline. Apparently around 9:30 AM patient removed her PEG tube. Apparently family had insisted that she come to the ER to have it replaced. Otherwise no complaints or issues. Chart review shows that patient had tube placed in 2021 and had a PEG tube exchange in August 2022. The patient has no other acute complaints at this time. REVIEW OF SYSTEMS   Review of Systems   Reason unable to perform ROS: RoS limited due to mentation. Constitutional:  Negative for fever. Skin:  Positive for wound. Neurological:  Positive for speech difficulty.        PAST MEDICAL AND SURGICAL HISTORY     Past Medical History:   Diagnosis Date    Alzheimer's dementia ([facility] [address]     Arthritis     CAD (coronary artery disease)     Cerebral artery occlusion with cerebral infarction [facility])     Chronic pain     Contracture of joint of shoulder region, left     Dysphagia     GERD (gastroesophageal reflux disease)     History of hypertension     Hyperlipidemia     Hypotension     Schizophrenia ([facility] [address] 01/21/2021    Seizure disorder ([facility] [address] 04/30/2019     Past Surgical History:   Procedure Laterality Date    GASTROSTOMY TUBE CHANGE N/A 8/17/2022    EGD WITH GASTROSTOMY TUBE CHANGE performed by Marzena Apodaca MD Rfl: SOCIAL HISTORY     Social History     Social History Narrative    Not on file     Social History     Tobacco Use    Smoking status: Former     Types: Cigarettes    Smokeless tobacco: Never   Substance Use Topics    Alcohol use: No    Drug use: Yes     Types: Marijuana (Weed)         ALLERGIES     Allergies   Allergen Reactions    Pcn [Penicillins]          FAMILY HISTORY     Family History   Problem Relation Age of Onset    Other Mother          PREVIOUS RECORDS   Previous records reviewed:  Patient was seen last on 8/17/2022 for EGD with gastrostomy tube change . PHYSICAL EXAM     ED Triage Vitals [12/23/22 1102]   BP Temp Temp src Heart Rate Resp SpO2 Height Weight   121/85 98.2 °F (36.8 °C) -- 80 17 98 % -- --     Initial vital signs and nursing assessment reviewed and vitals are/show: Afebrile, Normotensive, Normocardic, and Normal RR. Pulsoximetry is normal per my interpretation. Additional Vital Signs:  Vitals:    12/23/22 1458   BP: (!) 172/145   Pulse: 89   Resp: 20   Temp:    SpO2: 98%       Physical Exam  Constitutional:       General: She is not in acute distress. Appearance: Normal appearance. She is not ill-appearing, toxic-appearing or diaphoretic. HENT:      Head: Normocephalic and atraumatic. Right Ear: External ear normal.      Left Ear: External ear normal.   Eyes:      General: No scleral icterus. Right eye: No discharge. Left eye: No discharge. Cardiovascular:      Rate and Rhythm: Normal rate. Pulmonary:      Effort: Pulmonary effort is normal. No respiratory distress. Breath sounds: Normal breath sounds. No stridor. No wheezing, rhonchi or rales. Chest:      Chest wall: No tenderness. Abdominal:      General: Abdomen is flat. Palpations: Abdomen is soft. Tenderness: There is no abdominal tenderness. There is no guarding.       Comments: Feeding tube stoma to upper anterior chest with granulation tissue, there is no tube in place.  Unable to express any drainage or discharge. There is no significant erythema surrounding the region. No crepitus no fluctuance. No abdominal tenderness to palpation. No rigidity no guarding   Musculoskeletal:         General: Normal range of motion. Cervical back: Normal range of motion. Skin:     General: Skin is warm. Neurological:      Mental Status: She is alert. Mental status is at baseline. MEDICAL DECISION MAKING   Initial Assessment:     67 y.o. female with a past medical history of Alzheimer's dementia, seizure disorder, PEG tube, HTN presenting to the ED for PEG tube replacement    Differential diagnoses include but not limited to: PEG tube removal obstruction fracture complication infection    Plan: Gupta placed into open stoma  Nursing staff to discuss w nursing home regarding size of patients previous PEG tube  XR for confirmation  Replaced gupta with formal G tube  XR confirmation  Discharge          Patient is a 67 y.o. female who was seen and evaluated in the emergency department for displaced G-tube. Patient has a history of Alzheimer's and has a PEG tube in place. She was sent to the ED for removal this morning. On initial evaluation her tube was dislodged and she had an open incision site with some granulation tissue. It was well-appearing without signs of infection. Since it was open I did place a Gupta catheter to keep the tract open. We got an x-ray for confirmation to make sure it is in the right spot. After waiting over an hour we then removed the Gupta and placed a formal 20 Western Cami G-tube into the site without difficulty. Patient tolerated both procedures well. We again got x-ray confirmation. We then placed a dressing to hopefully prevent future dislodgments by the patient. Patient discharged.               Feeding Tube    Date/Time: 12/23/2022 11:28 AM  Performed by: Alexa Liz MD  Authorized by: DO Everardo Padilla protocol:     Test results available: yes      Imaging studies available: yes      Required blood products, implants, devices, and special equipment available: yes      Site/side marked: yes    Pre-procedure details: Old tube type:  Gastrostomy  Sedation:     Sedation type:  None  Anesthesia:     Anesthesia method:  None  Procedure details:     Patient position:  Supine    Procedure type:  Replacement    Tube type: Ndiaye catheter. Tube size:  16 Fr    Bulb inflation volume:  10cc    Bulb inflation fluid:  Sterile water  Post-procedure details:     Placement/position confirmation:  X-ray    Placement difficulty:  None    Bleeding:  None    Procedure completion:  Tolerated  Feeding Tube    Date/Time: 12/23/2022 3:05 PM  Performed by: William Hein MD  Authorized by: Donita Zhang DO     Procedure details:     Patient position:  Supine    Procedure type:  Replacement    Tube type:  Gastrostomy    Tube size: 20 Mongolian. Bulb inflation volume:  20cc    Bulb inflation fluid:  Sterile water  Post-procedure details:     Placement/position confirmation:  X-ray    Placement difficulty:  None    Bleeding:  None    Procedure completion:  Tolerated        ED RESULTS   Laboratory results:  Labs Reviewed - No data to display    Radiologic studies results:  XR INJ CONTRAST GASTRIC TUBE PERC   Final Result    No evidence of leak. **This report has been created using voice recognition software. It may contain minor errors which are inherent in voice recognition technology. **      Final report electronically signed by Dr. Jordon Monroy MD on 12/23/2022 1:53 PM      XR INJ CONTRAST GASTRIC TUBE PERC   Final Result    No evidence of leak. **This report has been created using voice recognition software. It may contain minor errors which are inherent in voice recognition technology. **      Final report electronically signed by Dr. Jordon Monroy MD on 12/23/2022 11:48 AM ED Medications administered this visit:   Medications   diatrizoate meglumine-sodium (GASTROGRAFIN) 66-10 % solution 30 mL (30 mLs PEG Tube Given 12/23/22 1136)   diatrizoate meglumine-sodium (GASTROGRAFIN) 66-10 % solution 30 mL (30 mLs Per G Tube Given 12/23/22 1336)         ED COURSE     ED Course as of 12/23/22 1505   Fri Dec 23, 2022   1119 Placed 16f gupta catheter into open PEG tube stoma; patient tolerated procedure without difficulty. Catheter placed without difficulty or issue. [CR]   9354 XR:IMPRESSION:   No evidence of leak. [CR]   1381 16F gupta removed; 20f G tube placed. Patient tolerated procedure without difficulty; G tube placed without difficulty or issue. [CR]   1342 Discussed verbal plan with RN regarding dressing over G tube to help keep G tube in place and prevent patient from removing [CR]   1401 XR: IMPRESSION:   No evidence of leak. [CR]      ED Course User Index  [CR] Bev Overton MD        Strict return precautions and follow up instructions were discussed with the patient prior to discharge, with which the patient agrees. MEDICATION CHANGES     New Prescriptions    No medications on file         FINAL DISPOSITION     Final diagnoses:   Encounter for feeding tube placement     Condition: condition: stable  Dispo: Discharge to nursing home      This transcription was electronically signed. Parts of this transcriptions may have been dictated by use of voice recognition software and electronically transcribed, and parts may have been transcribed with the assistance of an ED scribe. The transcription may contain errors not detected in proofreading. Please refer to my supervising physician's documentation if my documentation differs.     Electronically Signed: Bev Overton MD, 12/23/22, 3:05 PM         Bev Overton MD  Resident  12/23/22 7972

## 2022-12-23 NOTE — ED NOTES
[facility] is called and pt is only to get Peg tube replaced while in ER.       Rip Michael RN  12/23/22 3725

## 2022-12-23 NOTE — ED NOTES
ED nurse-to-nurse bedside report    Chief Complaint   Patient presents with    Feeding Tube Problem     Peg tube fell out      LOC: pt is nonverbal and at baseline metation  Vital signs   Vitals:    12/23/22 1220 12/23/22 1326 12/23/22 1452 12/23/22 1458   BP: 124/89 (!) 161/136  (!) 172/145   Pulse: 88 89  89   Resp: 20 18  20   Temp:       SpO2: 98% 98%  98%   Weight:   173 lb (78.5 kg)       Pain:    Pain Interventions: none  Pain Goal: n/a  Oxygen: No    Current needs required none   Telemetry: No  LDAs:    Continuous Infusions:   Mobility: Requires assistance * 2  [place] Fall Risk Score: No flowsheet data found.   Fall Interventions: SRx2, frequent monitoring  Report given to: Fernando Cross RN  12/23/22 8734

## 2022-12-23 NOTE — ED NOTES
Pt is laying in bed at this time with VSS. No family at bedside.       Ketty Eldridge RN  12/23/22 0040

## 2023-01-04 ENCOUNTER — APPOINTMENT (OUTPATIENT)
Dept: GENERAL RADIOLOGY | Age: 73
End: 2023-01-04
Payer: COMMERCIAL

## 2023-01-04 ENCOUNTER — HOSPITAL ENCOUNTER (EMERGENCY)
Age: 73
Discharge: HOME | End: 2023-01-04
Payer: COMMERCIAL

## 2023-01-04 VITALS
OXYGEN SATURATION: 94 % | RESPIRATION RATE: 16 BRPM | DIASTOLIC BLOOD PRESSURE: 76 MMHG | TEMPERATURE: 98 F | HEART RATE: 60 BPM | SYSTOLIC BLOOD PRESSURE: 92 MMHG

## 2023-01-04 DIAGNOSIS — K94.23 PEG TUBE MALFUNCTION (HCC): Primary | ICD-10-CM

## 2023-01-04 PROCEDURE — 49465 FLUORO EXAM OF G/COLON TUBE: CPT

## 2023-01-04 PROCEDURE — 6360000004 HC RX CONTRAST MEDICATION: Performed by: NURSE PRACTITIONER

## 2023-01-04 PROCEDURE — 99283 EMERGENCY DEPT VISIT LOW MDM: CPT

## 2023-01-04 RX ADMIN — DIATRIZOATE MEGLUMINE AND DIATRIZOATE SODIUM 30 ML: 600; 100 SOLUTION ORAL; RECTAL at 11:40

## 2023-01-04 NOTE — ED NOTES
Resting on cart in no distress with respirations easy and unlabored     Sruthi Kim RN  01/04/23 4767

## 2023-01-04 NOTE — ED NOTES
FRANCISCO to come get patient at this time. Spoke with nurse at [facility] to let them know she was coming back. Transferred to give report to RN but no answer.      Josie Gil RN  01/04/23 2182

## 2023-01-04 NOTE — ED PROVIDER NOTES
[facility] Emergency [address] [place] [address]       Chief Complaint   Patient presents with    Other     Peg tube replaced       Nurses Notes reviewed and I agree except as noted in the HPI. HISTORY OF PRESENT ILLNESS    Rashawn Barrett is a 67 y.o. female who presents to the ED for evaluation of PEG tube displacement. Patient is nonverbal, has a history of stroke in the past.  Lives at [facility]. They note that she had a 20 English PEG tube in place. HPI was provided by the patient. REVIEW OF SYSTEMS     Review of Systems   Unable to perform ROS: Patient nonverbal      PAST MEDICAL HISTORY     Past Medical History:   Diagnosis Date    Alzheimer's dementia ([facility] [address]     Arthritis     CAD (coronary artery disease)     Cerebral artery occlusion with cerebral infarction [facility])     Chronic pain     Contracture of joint of shoulder region, left     Dysphagia     GERD (gastroesophageal reflux disease)     History of hypertension     Hyperlipidemia     Hypotension     Schizophrenia ([facility] [address] 01/21/2021    Seizure disorder ([facility] [address] 04/30/2019       SURGICALHISTORY      has a past surgical history that includes Tonsillectomy; Tubal ligation; Gastrostomy tube placement (N/A, 2/17/2021); and gastrostomy tube change (N/A, 8/17/2022).     CURRENT MEDICATIONS       Discharge Medication List as of 1/4/2023  1:33 PM        CONTINUE these medications which have NOT CHANGED    Details   midodrine (PROAMATINE) 5 MG tablet 1 tablet by PEG Tube route 3 times daily (with meals), Disp-90 tablet, R-3DC to SNF      donepezil (ARICEPT) 10 MG tablet 10 mg by Per G Tube route nightly Historical Med      furosemide (LASIX) 10 MG/ML solution 20 mg by Per G Tube route daily Historical Med      famotidine (PEPCID) 40 MG/5ML suspension 20 mg by Per G Tube route daily Historical Med      scopolamine (TRANSDERM-SCOP) transdermal patch Place 1 patch onto the skin every 72 hoursHistorical Med      senna (SENOKOT) 8.6 MG tablet 1 tablet by Per G Tube route daily Historical Med      traZODone (DESYREL) 100 MG tablet 100 mg by Per G Tube route nightly Historical Med      !! acetaminophen (TYLENOL) 500 MG tablet 1,000 mg by Per G Tube route 3 times dailyHistorical Med      ipratropium-albuterol (DUONEB) 0.5-2.5 (3) MG/3ML SOLN nebulizer solution Inhale 1 vial into the lungs every 4 hours as needed for Shortness of BreathHistorical Med      aspirin 81 MG chewable tablet 81 mg by Per G Tube route dailyHistorical Med      valproic acid (DEPACON) 250 MG/5ML SOLN oral solution 1,000 mg by Per G Tube route every 8 hours Historical Med      metoprolol (LOPRESSOR) 25 MG tablet 12.5 mg by Per G Tube route daily Hold if SBP < 110 or HR < 55Historical Med      !! acetaminophen (TYLENOL) 325 MG tablet 650 mg by Per G Tube route every 4 hours as needed for Pain or Fever Historical Med       !! - Potential duplicate medications found. Please discuss with provider. ALLERGIES     is allergic to pcn [penicillins]. FAMILY HISTORY     She indicated that her mother is . She indicated that her sister is alive. family history includes Other in her mother.     SOCIAL HISTORY       Social History     Socioeconomic History    Marital status:      Spouse name: Not on file    Number of children: 6    Years of education: 11    Highest education level: Not on file   Occupational History    Not on file   Tobacco Use    Smoking status: Former     Types: Cigarettes    Smokeless tobacco: Never   Vaping Use    Vaping Use: Not on file   Substance and Sexual Activity    Alcohol use: No    Drug use: Yes     Types: Marijuana [employer]ona Members)    Sexual activity: Never   Other Topics Concern    Not on file   Social History Narrative    Not on file     Social Determinants of Health     Financial Resource Strain: Not on file   Food Insecurity: Not on file   Transportation Needs: Not on file   Physical Activity: Not on file   Stress: Not on file   Social Connections: Not on file Intimate Partner Violence: Not on file   Housing Stability: Not on file       PHYSICAL EXAM     INITIAL VITALS:  axillary temperature is 98 °F (36.7 °C). Her blood pressure is 92/76 and her pulse is 60. Her respiration is 16 and oxygen saturation is 94%. Physical Exam  Constitutional:       General: She is not in acute distress. Appearance: She is not ill-appearing or toxic-appearing. Cardiovascular:      Rate and Rhythm: Normal rate. Pulmonary:      Effort: Pulmonary effort is normal.   Abdominal:      General: Abdomen is flat. There is no distension. Palpations: Abdomen is soft. Tenderness: There is no abdominal tenderness. There is no guarding. Comments: Noted at gastrostomy tube site to left upper quadrant   Skin:     General: Skin is warm and dry. Capillary Refill: Capillary refill takes less than 2 seconds. Neurological:      Mental Status: She is alert. DIFFERENTIAL DIAGNOSIS:   PEG tube displacement    DIAGNOSTIC RESULTS     RADIOLOGY: non-plainfilm images(s) such as CT, Ultrasound and MRI are read by the radiologist.  Plain radiographic images are visualized and preliminarily interpreted by the emergency physician unless otherwise stated below. XR INJ CONTRAST GASTRIC TUBE PERC   Final Result   1. Nonspecific bowel gas pattern. 2. After the infusion of Gastrografin, there is contrast within the stomach. No evidence of leak. **This report has been created using voice recognition software. It may contain minor errors which are inherent in voice recognition technology. **      Final report electronically signed by Dr Nanda Blake on 1/4/2023 12:00 PM            LABS:   Labs Reviewed - No data to display    EMERGENCY DEPARTMENT COURSE:   Vitals:    Vitals:    01/04/23 1033 01/04/23 1104 01/04/23 1202 01/04/23 1333   BP: (!) 129/102 100/88 101/82 92/76   Pulse: 66   60   Resp: 20   16   Temp: 98 °F (36.7 °C)      TempSrc: Axillary      SpO2: 95% 94%  94% MDM    Patient was seen and evaluated in the emergency department, patient appears to be in no acute distress, vital signs are reviewed, no significant findings are noted. Physical exam is completed, noted PEG tube site with no PEG tube. 20 Khmer PEG tube was obtained, and was replaced with little difficulty. X-rays were obtained to confirm appropriate placement, this was completed and that note good placement. While here in the ER the patient maintained stable course and appropriate for discharge back to nursing facility. Medications   diatrizoate meglumine-sodium (GASTROGRAFIN) 66-10 % solution 30 mL (30 mLs PEG Tube Given 1/4/23 1140)       Patient was seenindependently by myself. The patient's final impression and disposition and plan was determined by myself. CRITICAL CARE:   None    CONSULTS:  None    PROCEDURES:  Feeding Tube    Date/Time: 1/4/2023 3:04 PM  Performed by: ALBINO Kraft CNP  Authorized by: ALBINO Kraft CNP     Universal protocol:     Patient identity confirmed:  Arm band  Pre-procedure details: Old tube type:  Gastrostomy    Old tube size: 20fr. Sedation:     Sedation type:  None  Anesthesia:     Anesthesia method:  None  Procedure details:     Patient position:  Supine    Procedure type:  Replacement    Tube type:  Gastrostomy    Tube size: 20fr.     Bulb inflation volume:  20    Bulb inflation fluid:  Normal saline  Post-procedure details:     Placement/position confirmation:  Gastric contents aspirated and contrast    Placement difficulty:  None    Bleeding:  Minimal    Procedure completion:  Tolerated well, no immediate complications      FINAL IMPRESSION     1. PEG tube malfunction [facility])          DISPOSITION/PLAN   [facility]    [facility]  [address]  [phone]  Go to   If symptoms worsen      DISCHARGE MEDICATIONS:  Discharge Medication List as of 1/4/2023  1:33 PM          (Please note that portions of this note were completed with a voice recognition program.  Efforts were made to edit the dictations but occasionally words are mis-transcribed.)      Provider:  I personally performed the services described in the documentation,reviewed and edited the documentation which was dictated to the scribe in my presence, and it accurately records my words and actions.     Riki Pritchard CNP 01/04/23 3:01 PM    ALBINO Carvalho - ORIANA         [employer], ALBINO - CNP  01/04/23 1507

## 2023-02-17 ENCOUNTER — CLINICAL DOCUMENTATION (OUTPATIENT)
Dept: PALLATIVE CARE | Age: 73
End: 2023-02-17

## 2023-02-17 NOTE — ACP (ADVANCE CARE PLANNING)
Advanced Steps Advance Care Planning Conversation  POLST (Goals of Care for Serious Illness and/or Frailty)         Date of conversation: 2/17/2023   Location: Patient at [facility],                                                                         Phone call with Guardian  Length (minutes): 20    Participants:   [] Patient    [x] Healthcare agent (already designated in existing ACP document)    Name: Evelia Yoo    Relationship to Patient:  Father, guardian    Phone number: (h) [phone], (c) [phone]    Other contacts NOT present:   Name: Staci Mcpherson son Isai Begum [phone]    Name:  Pt's daughter Phil Whitt [phone]    Patient is known to me from multiple palliative care encounters in previous years. She is currently on [facility]. Patient without capacity. Advanced Steps® ACP Facilitator: Jazz Callejas RN, BSN, Appifier    Conversation Topics     Understanding of Medical Condition/s AND Potential Complications:    Patient response: N/A  Healthcare Agent/Other Surrogate: \"She has end stage Alzheimer's\"    Lesson Olivia from Experiences Related to Serious Illness: \"I've learned that they have ways to tell if she is in pain or not. \"    Identifies the following as important for living well: \"I think she recognizes my voice when I visit her. And as long as she's comfortable, that's what she needs. \"    Hopes: \"To keep getting reports that she's comfortable. \"      Worries/Fears about Medical Condition: \"I know she is getting great care. \"    Sources of support/comfort described as: \"Family, hospice and staff\"    Cultural, Sabianist, spiritual, or personal beliefs described as: \"[place]\"    Needs to discuss with spiritual/Sabianist advisor: [] Yes  [x] No    Needs more information about illness and complications:  [] Yes  [x] No    Cardiopulmonary Resuscitation      \"What do you understand about CPR? \" Response: \"I've already changed her to a DNR.\"    Order Elected for CPR:  []  Yes CPR [x] No CPR      When NOT in Cardiopulmonary Arrest, Order Elected:      [] Full Treatments  [] Selective Treatments  [x] Comfort-Focused Treatments    Medically-Assisted Nutrition:    [x] Provide feeding through new or existing surgically-placed tubes (receives Nutren)  [] Trial period for artificial nutrition but no surgically-placed tubes  [] No artificial means of nutrition desired  [] Not discussed or no decision made (provide standard of care)    Provided the following to the patient and/or decision maker (check all that apply):      Healthcare Decision Information Cards:   [] What is a Ventilator   [] What is Artificial Nutrition and Hydration   [] What is CPR      [x] Review of existing Advance Directive  [] Assistance with completion of new Living Will   [] Assistance with completion of [address]  [] Review of POLST Form       Meeting Outcomes:   [x] ACP discussion completed  [x] Copy of Guardianship scanned into Brigade this date   [] POLST form completed  [] POLST prepared for Provider review and signature  [] Original placed on chart, if in facility (form to be sent with patient at tranfser / discharge)  [] Copy given to healthcare agent    [x] Copy of ACP conversation scanned to facility electronic medical record    If ACP discussion not completed, last interview topic discussed: Completed. POLST not completed d/t her being on hospice. \"She has everything she needs. \"     Follow-up plan:     [] Schedule follow-up conversation to continue planning   [] Referred individual to Provider for additional questions/concerns   [] Advised patient/agent/surrogate to review completed POLST form and update if needed with changes in condition, patient preferences or care setting     [x] This note routed to one or more involved healthcare providers, Dr. [place]

## 2023-11-28 ENCOUNTER — APPOINTMENT (OUTPATIENT)
Dept: GENERAL RADIOLOGY | Age: 73
End: 2023-11-28
Payer: COMMERCIAL

## 2023-11-28 ENCOUNTER — HOSPITAL ENCOUNTER (EMERGENCY)
Age: 73
Discharge: NURSING, REHAB OR LONG-TERM CARE | End: 2023-11-29
Attending: EMERGENCY MEDICINE
Payer: COMMERCIAL

## 2023-11-28 DIAGNOSIS — K94.20 COMPLICATION OF FEEDING TUBE (HCC): Primary | ICD-10-CM

## 2023-11-28 PROCEDURE — 6360000004 HC RX CONTRAST MEDICATION: Performed by: EMERGENCY MEDICINE

## 2023-11-28 PROCEDURE — 49465 FLUORO EXAM OF G/COLON TUBE: CPT

## 2023-11-28 PROCEDURE — 99285 EMERGENCY DEPT VISIT HI MDM: CPT

## 2023-11-28 RX ADMIN — DIATRIZOATE MEGLUMINE AND DIATRIZOATE SODIUM 30 ML: 600; 100 SOLUTION ORAL; RECTAL at 22:20

## 2023-11-28 ASSESSMENT — PAIN SCALES - WONG BAKER
WONGBAKER_NUMERICALRESPONSE: 0

## 2023-11-28 ASSESSMENT — PAIN - FUNCTIONAL ASSESSMENT
PAIN_FUNCTIONAL_ASSESSMENT: WONG-BAKER FACES

## 2023-11-29 VITALS
HEART RATE: 87 BPM | RESPIRATION RATE: 22 BRPM | OXYGEN SATURATION: 97 % | DIASTOLIC BLOOD PRESSURE: 105 MMHG | SYSTOLIC BLOOD PRESSURE: 142 MMHG | BODY MASS INDEX: 32.32 KG/M2 | HEIGHT: 61 IN | WEIGHT: 171.2 LBS | TEMPERATURE: 98.3 F

## 2023-11-29 ASSESSMENT — PAIN SCALES - WONG BAKER
WONGBAKER_NUMERICALRESPONSE: 0

## 2023-11-29 ASSESSMENT — PAIN - FUNCTIONAL ASSESSMENT
PAIN_FUNCTIONAL_ASSESSMENT: WONG-BAKER FACES

## 2023-11-29 NOTE — ED PROVIDER NOTES
display      PROCEDURES: (None if blank)  Procedures:     CRITICAL CARE: (None if blank)      DISCHARGE PRESCRIPTIONS: (None if blank)  Discharge Medication List as of 11/29/2023  3:36 AM          FINAL IMPRESSION      1.  Complication of feeding tube [facility])          DISPOSITION/PLAN   DISPOSITION Decision To Discharge 11/29/2023 03:33:27 AM      OUTPATIENT FOLLOW UP THE PATIENT:  Mohit Gilbert  [phone]    Schedule an appointment as soon as possible for a visit   If symptoms worsen      MD Freida Lopez MD  11/29/23 4575

## 2023-11-29 NOTE — ED NOTES
FRANCISCO called to take patient back to [facility]. FRANCISCO ETA 0300.      Eve Arzola RN  11/28/23 6753

## 2023-11-29 NOTE — ED NOTES
Patient resting on cot. Non-rebreather mask in place. Patient covered up with blankets. VS stable.      Anthony Lamaer, AVANI  11/29/23 2482

## 2023-11-29 NOTE — ED NOTES
Patient resting on cot. Phlegm suctioned out of patient mouth at this time. Patient covered up with blanket.       Miguelina Castro RN  11/29/23 4161

## 2023-11-29 NOTE — ED TRIAGE NOTES
Patient presents to ED from [facility] via EMS with C/O pulling out G-tube. EMS states the nurse stated this has happened before. Patient pulled G-tube out approximally 25min PTA. Patient currently on non-rebreather mask due to pneumonia diagnosed over a week ago.

## 2023-11-29 NOTE — DISCHARGE INSTRUCTIONS
You were seen for displaced feeding tube. This was replaced with a Ndiaye catheter that can be used as a feeding tube temporarily. Please call interventional radiology tomorrow and schedule an appointment for a new feeding tube placement.

## 2023-11-29 NOTE — ED NOTES
Patient resting on cot. Non-rebreather mask in place. Patient covered with blanket. VS stable.       Stephan Trivedi RN  11/29/23 0148

## 2023-11-29 NOTE — ED NOTES
Dr Janneth Pedroza at bedside. Dr Janneth Pedroza placing G-tube  at this time.         Lidia Jenkins RN  11/28/23 2681

## (undated) DEVICE — ADAPTER CLEANING PORPOISE CLEANING

## (undated) DEVICE — KIT PEG 20FR STD PUL EN ACCS DEV ENDOVIVE

## (undated) DEVICE — TUBING, SUCTION, 1/4" X 20', STRAIGHT: Brand: MEDLINE INDUSTRIES, INC.

## (undated) DEVICE — APPLICATOR MEDICATED 26 CC SOLUTION CLR STRL CHLORAPREP

## (undated) DEVICE — GLOVE ORANGE PI 8   MSG9080

## (undated) DEVICE — GLOVE ORANGE PI 7 1/2   MSG9075

## (undated) DEVICE — YANKAUER,BULB TIP,W/O VENT,RIGID,STERILE: Brand: MEDLINE